# Patient Record
Sex: MALE | Race: WHITE | NOT HISPANIC OR LATINO | Employment: FULL TIME | URBAN - METROPOLITAN AREA
[De-identification: names, ages, dates, MRNs, and addresses within clinical notes are randomized per-mention and may not be internally consistent; named-entity substitution may affect disease eponyms.]

---

## 2017-01-20 ENCOUNTER — HOSPITAL ENCOUNTER (EMERGENCY)
Facility: HOSPITAL | Age: 52
Discharge: HOME | End: 2017-01-20
Attending: SURGERY
Payer: MEDICAID

## 2017-01-20 VITALS
WEIGHT: 180 LBS | HEART RATE: 94 BPM | TEMPERATURE: 97 F | BODY MASS INDEX: 23.11 KG/M2 | DIASTOLIC BLOOD PRESSURE: 92 MMHG | SYSTOLIC BLOOD PRESSURE: 157 MMHG | RESPIRATION RATE: 18 BRPM

## 2017-01-20 DIAGNOSIS — R22.0 LEFT FACIAL SWELLING: Primary | ICD-10-CM

## 2017-01-20 PROCEDURE — 99283 EMERGENCY DEPT VISIT LOW MDM: CPT | Mod: 25

## 2017-01-20 PROCEDURE — 96372 THER/PROPH/DIAG INJ SC/IM: CPT

## 2017-01-20 PROCEDURE — 63600175 PHARM REV CODE 636 W HCPCS: Performed by: SURGERY

## 2017-01-20 RX ORDER — KETOROLAC TROMETHAMINE 30 MG/ML
60 INJECTION, SOLUTION INTRAMUSCULAR; INTRAVENOUS
Status: COMPLETED | OUTPATIENT
Start: 2017-01-20 | End: 2017-01-20

## 2017-01-20 RX ORDER — DEXAMETHASONE SODIUM PHOSPHATE 4 MG/ML
8 INJECTION, SOLUTION INTRA-ARTICULAR; INTRALESIONAL; INTRAMUSCULAR; INTRAVENOUS; SOFT TISSUE
Status: COMPLETED | OUTPATIENT
Start: 2017-01-20 | End: 2017-01-20

## 2017-01-20 RX ADMIN — KETOROLAC TROMETHAMINE 60 MG: 30 INJECTION, SOLUTION INTRAMUSCULAR at 01:01

## 2017-01-20 RX ADMIN — DEXAMETHASONE SODIUM PHOSPHATE 8 MG: 4 INJECTION, SOLUTION INTRAMUSCULAR; INTRAVENOUS at 01:01

## 2017-01-20 NOTE — ED PROVIDER NOTES
Encounter Date: 1/20/2017       History     Chief Complaint   Patient presents with    Facial Pain     left facial swelling and pain, onset yesterday morning    Facial Swelling     Review of patient's allergies indicates:   Allergen Reactions    Adhesive Rash    Latex, natural rubber Rash     HPI Jett Ballard is a 51 y.o. male who experienced a stroke 2 years ago.  He has left-sided weakness   and numbness.  This morning he woke up with mild swelling over his left cheek.  He states it got worse as   the day progressed.  He cannot identify any insect bite, exposure or trauma.  He's never had this before.    He denies any change in his condition, he denies earache or toothache.  He has had this before.  No   physical cause has ever been identified.  He has not tried to see his PCP.  Denies fever or chills.  No   nausea or vomiting.  Denies any worsening weakness or new instability.  Does not Interfere with his ability   to eat or speak.    Past Medical History   Diagnosis Date    Hypertension     Left sided numbness     Leg weakness     Other and unspecified hyperlipidemia     Positional lightheadedness     Stroke     Tremor     Weakness      No past medical history pertinent negatives.  Past Surgical History   Procedure Laterality Date    Back surgery       Family History   Problem Relation Age of Onset    Heart disease Father     Cancer Father      Social History   Substance Use Topics    Smoking status: Never Smoker    Smokeless tobacco: Current User     Types: Snuff      Comment: 33 yr history of dip    Alcohol use No     Review of Systems  -- Constitution - no fever, denies fatigue, no weakness, stable weight  -- Eyes - no tearing or redness, no change in vision, no double vision  -- Ear, Nose - no tinnitus or earache, no nasal congestion or discharge  -- Mouth,Throat - no sore throat, no toothache, denies dysphagia, normal swallowing  -- Respiratory - denies cough and sputum, no shortness of  breath, no GIRALDO, no wheeze  -- Cardiovascular - denies chest pain, no palpitations, denies claudication. No orthopnea   -- Gastrointestinal - denies abdominal pain, nausea, vomiting, diarrhea, or constipation.   -- Genitourinary - denies flank pain and dysuria, also denies frequency or urgency  -- Musculoskeletal - denies muscle or joint pain, back pain  -- Skin - denies rash, hives or welts.  There is mild fullness over the left cheek  -- Neurological - no headache, denies weakness or seizure; no loss of consciousness  -- Lymphatic- no lymphadenopathy or lymphedema noted by patient  -- Endocrine - no thirst, no excessive urination, no heat/cold intolerance.  -- Hematologic - denies abnormal bleeding or bruising, no petechiae  -- Allergic - no immunology issues, no shell fish allergies, no allergic rashes    Physical Exam   Initial Vitals   BP Pulse Resp Temp SpO2   01/20/17 0047 01/20/17 0047 01/20/17 0047 01/20/17 0047 --   155/103 102 18 97.3 °F (36.3 °C)      Physical Exam    Nursing note and vitals reviewed.  Constitutional: He appears well-developed. He is not diaphoretic. No distress.   Thin male in no acute distress.   HENT:   Head: Normocephalic and atraumatic.   Right Ear: External ear normal.   Left Ear: External ear normal.   Nose: Nose normal.   Mouth/Throat: Oropharynx is clear and moist.   Eyes: Conjunctivae and EOM are normal. Pupils are equal, round, and reactive to light. No scleral icterus.   Neck: Normal range of motion. Neck supple. No thyromegaly present. No tracheal deviation present. No JVD present.   Cardiovascular: Normal rate, regular rhythm, normal heart sounds and intact distal pulses. Exam reveals no friction rub.    No murmur heard.  Pulmonary/Chest: Breath sounds normal. No stridor. No respiratory distress. He has no wheezes. He has no rhonchi. He has no rales. He exhibits no tenderness.   Abdominal: Soft. He exhibits no distension and no mass. There is no tenderness. There is no  rebound and no guarding.   Musculoskeletal: He exhibits no edema or tenderness.   Lymphadenopathy:     He has no cervical adenopathy.   Neurological: He is alert. He has normal strength. No cranial nerve deficit or sensory deficit.   Skin: Skin is warm and dry. No erythema. No pallor.   The skin over the left cheek appears to have more laxity than over the right cheek..  There may be mild swelling.  There is no erythema or edema.  It is nontender.  There are no wounds or lesions.   Psychiatric: He has a normal mood and affect. Thought content normal.         ED Course   Procedures  Labs Reviewed - No data to display         MDM: No gross focal neuromuscular deficit.  Patient is not ill.  Symptomatic treatment.    Support and encouragement.  Follow up with PCP.                    ED Course     Clinical Impression:   The encounter diagnosis was Left facial swelling.          Milena Egan MD  01/20/17 0137

## 2017-01-20 NOTE — ED AVS SNAPSHOT
[facility]  [address] [facility] [address]               Jett Ballard   2017 12:48 AM   ED    Description:  Male : 1965   Department:  [facility]           Your Care was Coordinated By:     Provider Role From To    Milena Egan MD Attending Provider 17 0048 --      Reason for Visit     Facial Pain     Facial Swelling           Diagnoses this Visit        Comments    Left facial swelling    -  Primary       ED Disposition     ED Disposition Condition Comment    Discharge  Follow up with PCP next week.           To Do List           Follow-up Information     Follow up with Maynor Benitez MD In 3 days.    Specialty:  Internal Medicine    Contact information:     [address]  [place] [address]  [phone]        [facility] On Call     [facility] -  Assistance  Registered nurses in the [facility] provide clinical advisement, health education, appointment booking, and other advisory services.  Call for this free service at [phone].             Medications           Message regarding Medications     Verify the changes and/or additions to your medication regime listed below are the same as discussed with your clinician today.  If any of these changes or additions are incorrect, please notify your healthcare provider.        These medications were administered today        Dose Freq    ketorolac injection 60 mg 60 mg ED 1 Time    Sig: Inject 2 mLs (60 mg total) into the muscle ED 1 Time.    Class: Normal    Route: Intramuscular    dexamethasone injection 8 mg 8 mg ED 1 Time    Sig: Inject 2 mLs (8 mg total) into the muscle ED 1 Time.    Class: Normal    Route: Intramuscular           Verify that the below list of medications is an accurate representation of the medications you are currently taking.  If none reported, the list may be blank. If incorrect, please contact your healthcare  provider. Carry this list with you in case of emergency.           Current Medications     benazepril (LOTENSIN) 10 MG tablet TAKE ONE-HALF TABLET BY MOUTH ONCE DAILY    benztropine (COGENTIN) 2 MG Tab Take 2 tablets (4 mg total) by mouth 3 (three) times daily.    butalbital-acetaminophen-caffeine -40 mg (FIORICET, ESGIC) -40 mg per tablet Take 1 tablet by mouth every 12 (twelve) hours as needed for Headaches.    diazePAM (VALIUM) 10 MG Tab Take 1 tablet (10 mg total) by mouth every 8 (eight) hours as needed (anxiety).    finasteride (PROSCAR) 5 mg tablet Take 1 tablet (5 mg total) by mouth once daily.    FLOMAX 0.4 mg Cp24 TAKE ONE CAPSULE BY MOUTH ONCE DAILY    gabapentin (NEURONTIN) 300 MG capsule Take 3 capsules (900 mg total) by mouth 3 (three) times daily.    ketorolac (TORADOL) 10 mg tablet Take 1 tablet (10 mg total) by mouth every 6 (six) hours as needed for Pain.    oxycodone-acetaminophen (PERCOCET)  mg per tablet Take 1 tablet by mouth every 8 (eight) hours as needed for Pain.    aspirin (ECOTRIN) 81 MG EC tablet Take 1 tablet (81 mg total) by mouth once daily.    triamcinolone acetonide 0.1% (KENALOG) 0.1 % cream Apply topically 2 (two) times daily.           Clinical Reference Information           Your Vitals Were     BP Pulse Temp Resp Weight BMI    155/103 (BP Location: Left arm, Patient Position: Sitting) 102 97.3 °F (36.3 °C) (Oral) 18 81.6 kg (180 lb) 23.11 kg/m2      Allergies as of 1/20/2017        Reactions    Adhesive Rash    Latex, Natural Rubber Rash      Immunizations Administered on Date of Encounter - 1/20/2017     None      ED Micro, Lab, POCT     None      ED Imaging Orders     None      Your Scheduled Appointments     Jan 31, 2017  2:00 PM CST   Established Patient Visit with MARGARITA Ramey - Urology ([facility])    [address]  [place] [address]   [phone]            Apr 17, 2017  7:30 AM CDT   Fasting Lab with [facility]  LAB   [facility]    [address]  [place] [id number] [phone]               [facility] complies with applicable Federal civil rights laws and does not discriminate on the basis of race, color, national origin, age, disability, or sex.        Language Assistance Services     ATTENTION: Language assistance services are available, free of charge. Please call [phone].      ATENCIÓN: Si habla español, tiene a mackay disposición servicios gratuitos de asistencia lingüística. Llame al [phone].     CHÚ Ý: N?u b?n nói Ti?ng Vi?t, có các d?ch v? h? tr? ngôn ng? mi?n phí dành cho b?n. G?i s? [phone].

## 2017-02-10 ENCOUNTER — HOSPITAL ENCOUNTER (EMERGENCY)
Facility: HOSPITAL | Age: 52
Discharge: HOME | End: 2017-02-10
Attending: SURGERY
Payer: MEDICAID

## 2017-02-10 VITALS
OXYGEN SATURATION: 98 % | HEIGHT: 74 IN | HEART RATE: 97 BPM | SYSTOLIC BLOOD PRESSURE: 135 MMHG | TEMPERATURE: 99 F | WEIGHT: 189 LBS | DIASTOLIC BLOOD PRESSURE: 102 MMHG | RESPIRATION RATE: 14 BRPM | BODY MASS INDEX: 24.26 KG/M2

## 2017-02-10 DIAGNOSIS — R53.1 WEAKNESS: ICD-10-CM

## 2017-02-10 DIAGNOSIS — I69.90 LATE EFFECTS OF CVA (CEREBROVASCULAR ACCIDENT): Primary | ICD-10-CM

## 2017-02-10 LAB
ALBUMIN SERPL BCP-MCNC: 4.2 G/DL
ALP SERPL-CCNC: 101 U/L
ALT SERPL W/O P-5'-P-CCNC: 15 U/L
ANION GAP SERPL CALC-SCNC: 9 MMOL/L
APTT BLDCRRT: 24.3 SEC
AST SERPL-CCNC: 16 U/L
BASOPHILS # BLD AUTO: 0.02 K/UL
BASOPHILS NFR BLD: 0.3 %
BILIRUB SERPL-MCNC: 0.5 MG/DL
BNP SERPL-MCNC: <10 PG/ML
BUN SERPL-MCNC: 16 MG/DL
CALCIUM SERPL-MCNC: 9.7 MG/DL
CHLORIDE SERPL-SCNC: 105 MMOL/L
CK MB SERPL-MCNC: 0.8 NG/ML
CK MB SERPL-RTO: 0.6 %
CK SERPL-CCNC: 130 U/L
CK SERPL-CCNC: 130 U/L
CO2 SERPL-SCNC: 25 MMOL/L
CREAT SERPL-MCNC: 1.1 MG/DL
DIFFERENTIAL METHOD: ABNORMAL
EOSINOPHIL # BLD AUTO: 0.2 K/UL
EOSINOPHIL NFR BLD: 2.4 %
ERYTHROCYTE [DISTWIDTH] IN BLOOD BY AUTOMATED COUNT: 12.6 %
EST. GFR  (AFRICAN AMERICAN): >60 ML/MIN/1.73 M^2
EST. GFR  (NON AFRICAN AMERICAN): >60 ML/MIN/1.73 M^2
GLUCOSE SERPL-MCNC: 91 MG/DL
HCT VFR BLD AUTO: 47.5 %
HGB BLD-MCNC: 15.9 G/DL
INR PPP: 1
LYMPHOCYTES # BLD AUTO: 0.9 K/UL
LYMPHOCYTES NFR BLD: 12.8 %
MCH RBC QN AUTO: 29.8 PG
MCHC RBC AUTO-ENTMCNC: 33.5 %
MCV RBC AUTO: 89 FL
MONOCYTES # BLD AUTO: 0.4 K/UL
MONOCYTES NFR BLD: 5 %
NEUTROPHILS # BLD AUTO: 5.6 K/UL
NEUTROPHILS NFR BLD: 79.5 %
PLATELET # BLD AUTO: 307 K/UL
PMV BLD AUTO: 9.8 FL
POTASSIUM SERPL-SCNC: 4.3 MMOL/L
PROT SERPL-MCNC: 8.1 G/DL
PROTHROMBIN TIME: 9.8 SEC
RBC # BLD AUTO: 5.33 M/UL
SODIUM SERPL-SCNC: 139 MMOL/L
TROPONIN I SERPL DL<=0.01 NG/ML-MCNC: <0.006 NG/ML
WBC # BLD AUTO: 6.98 K/UL

## 2017-02-10 PROCEDURE — 85730 THROMBOPLASTIN TIME PARTIAL: CPT

## 2017-02-10 PROCEDURE — 80053 COMPREHEN METABOLIC PANEL: CPT

## 2017-02-10 PROCEDURE — 85610 PROTHROMBIN TIME: CPT

## 2017-02-10 PROCEDURE — 83880 ASSAY OF NATRIURETIC PEPTIDE: CPT

## 2017-02-10 PROCEDURE — 93010 ELECTROCARDIOGRAM REPORT: CPT | Mod: ,,, | Performed by: INTERNAL MEDICINE

## 2017-02-10 PROCEDURE — 99284 EMERGENCY DEPT VISIT MOD MDM: CPT

## 2017-02-10 PROCEDURE — 36415 COLL VENOUS BLD VENIPUNCTURE: CPT

## 2017-02-10 PROCEDURE — 85025 COMPLETE CBC W/AUTO DIFF WBC: CPT

## 2017-02-10 PROCEDURE — 93005 ELECTROCARDIOGRAM TRACING: CPT

## 2017-02-10 PROCEDURE — 82553 CREATINE MB FRACTION: CPT

## 2017-02-10 PROCEDURE — 84484 ASSAY OF TROPONIN QUANT: CPT

## 2017-02-10 RX ORDER — NAPROXEN SODIUM 220 MG/1
162 TABLET, FILM COATED ORAL DAILY
COMMUNITY

## 2017-02-10 NOTE — ED AVS SNAPSHOT
[facility]  [address] [facility] [address]               Jett Ballard   2/10/2017  2:50 PM   ED    Description:  Male : 1965   Department:  [facility]           Your Care was Coordinated By:     Provider Role From To    Philippe Luis MD Attending Provider 02/10/17 1452 --      Reason for Visit     possible stroke           Diagnoses this Visit        Comments    Late effects of CVA (cerebrovascular accident)    -  Primary     Weakness           ED Disposition     ED Disposition Condition Comment    Discharge             To Do List           Follow-up Information     Follow up with Maynor Benitez MD. Schedule an appointment as soon as possible for a visit in 2 days.    Specialty:  Internal Medicine    Contact information:     [address]  [place] [address]  [phone]        [facility] On Call     [facility] Care [facility] -  Assistance  Registered nurses in the [facility] provide clinical advisement, health education, appointment booking, and other advisory services.  Call for this free service at [phone].             Medications           Message regarding Medications     Verify the changes and/or additions to your medication regime listed below are the same as discussed with your clinician today.  If any of these changes or additions are incorrect, please notify your healthcare provider.        STOP taking these medications     butalbital-acetaminophen-caffeine -40 mg (FIORICET, ESGIC) -40 mg per tablet Take 1 tablet by mouth every 12 (twelve) hours as needed for Headaches.    diazePAM (VALIUM) 10 MG Tab Take 1 tablet (10 mg total) by mouth every 8 (eight) hours as needed (anxiety).           Verify that the below list of medications is an accurate representation of the medications you are currently taking.  If none reported, the list may be blank. If incorrect, please contact your healthcare  "provider. Carry this list with you in case of emergency.           Current Medications     aspirin 81 MG Chew Take 81 mg by mouth once daily.    benazepril (LOTENSIN) 10 MG tablet TAKE ONE-HALF TABLET BY MOUTH ONCE DAILY    benztropine (COGENTIN) 2 MG Tab Take 2 tablets (4 mg total) by mouth 3 (three) times daily.    finasteride (PROSCAR) 5 mg tablet Take 1 tablet (5 mg total) by mouth once daily.    gabapentin (NEURONTIN) 300 MG capsule Take 3 capsules (900 mg total) by mouth 3 (three) times daily.    oxycodone-acetaminophen (PERCOCET)  mg per tablet Take 1 tablet by mouth every 8 (eight) hours as needed for Pain.    tamsulosin (FLOMAX) 0.4 mg Cp24 Take 1 capsule (0.4 mg total) by mouth once daily.    triamcinolone acetonide 0.1% (KENALOG) 0.1 % cream Apply topically 2 (two) times daily.           Clinical Reference Information           Your Vitals Were     BP Pulse Temp Resp Height Weight    135/102 (BP Location: Left arm, Patient Position: Lying) 106 99.3 °F (37.4 °C) (Oral) 19 6' 2" (1.88 m) 85.7 kg (189 lb)    SpO2 BMI             100% 24.27 kg/m2         Allergies as of 2/10/2017        Reactions    Adhesive Rash    Latex, Natural Rubber Rash      Immunizations Administered on Date of Encounter - 2/10/2017     None      ED Micro, Lab, POCT     Start Ordered       Status Ordering Provider    02/10/17 1514 02/10/17 1513  Troponin I  Now then every 6 hours     Start Status   02/10/17 1514 Final result   02/10/17 2114 Acknowledged   02/11/17 0314 Scheduled   02/11/17 0914 Scheduled   02/11/17 1514 Scheduled   02/11/17 2114 Scheduled   02/12/17 0314 Scheduled   02/12/17 0914 Scheduled   02/12/17 1514 Scheduled   02/12/17 2114 Scheduled   02/13/17 0314 Scheduled   02/13/17 0914 Scheduled   02/13/17 1514 Scheduled   02/13/17 2114 Scheduled       Acknowledged     02/10/17 1514 02/10/17 1513  CK  STAT      Final result     02/10/17 1514 02/10/17 1513  CK-MB  STAT      Final result     02/10/17 1514 02/10/17 1513 "  Brain natriuretic peptide  STAT      Final result     02/10/17 1514 02/10/17 1513    STAT,   Status:  Canceled      Canceled     02/10/17 1514 02/10/17 1513    STAT,   Status:  Canceled      Canceled     02/10/17 1458 02/10/17 1457  CBC auto differential  STAT      Final result     02/10/17 1458 02/10/17 1457  Comprehensive metabolic panel  STAT      Final result     02/10/17 1458 02/10/17 1457  Protime-INR  STAT      Final result     02/10/17 1458 02/10/17 1457  APTT  STAT      Final result       ED Imaging Orders     Start Ordered       Status Ordering Provider    02/10/17 1514 02/10/17 1513  X-Ray Chest 1 View  1 time imaging      Final result     02/10/17 1514 02/10/17 1513  MRI Brain Without Contrast  1 time imaging      Final result     02/10/17 1452 02/10/17 1452    1 time imaging,   Status:  Canceled      Canceled         Discharge Instructions         Mood Swings and Depression After a Stroke  After a stroke, a person may feel sudden or extreme emotions. Sadness and depression are common. These feelings may be due to damage in the brain. Or they may be a response to the persons awareness of what has happened.  Coping with mood swings    One common effect of stroke is lability. This problem makes people less able to control their emotions. Lability may cause a sudden mood shift that is out of context with what is going on. A person may suddenly cry or laugh.  You can help  · Stay calm. Accept the behavior and go on with what you were doing.  · If the person apologizes, acknowledge the behavior as a result of the stroke.  · Do not criticize.  · Treat the person with respect at all times.  Dealing with depression  A person may feel depressed after having a stroke. This may be due to brain damage. Changes in body image and grieving for lost skills, such as speech or freedom of movement, may also cause depression.  You can help  · Ask the doctor whether medication can help reduce the depression. You may need  to take your loved one to see a psychiatrist or psychologist if he or she has severe depression.  · Help the person stay active. Play games, watch TV, take a walk, or listen to music together.  · Ask friends to visit if the person is willing to see them.  · Do not discount depression by telling the person to cheer up.  Date Last Reviewed: 6/8/2015  © 3650-1367 [employer]. [address] [place] [address], [place] [address]. All rights reserved. This information is not intended as a substitute for professional medical care. Always follow your healthcare professional's instructions.          Your Scheduled Appointments     Feb 17, 2017  8:00 AM CST   Nurse Visit with UROLOGY NURSE, WES Kirby - Urology ([facility])    [address] [place] [address]   [phone]            Apr 17, 2017  7:30 AM CDT   Fasting Lab with [facility]    [address] [place] [address]   [phone]            Apr 28, 2017  2:20 PM CDT   Established Patient Visit with MD TERRY Loo - Family Medicine ([facility])    [address] [place] [address]   [phone]            May 25, 2017  9:30 AM CDT   Established Patient Visit with MARGARITA Ramey - Urology ([facility])    [address] [place] [address]   [phone]               [facility] complies with applicable Federal civil rights laws and does not discriminate on the basis of race, color, national origin, age, disability, or sex.        Language Assistance Services     ATTENTION: Language assistance services are available, free of charge. Please call [phone].      ATENCIÓN: Si habla molina, tiene a mackay disposición servicios gratuitos de asistencia lingüística. Llame al [phone].     CHÚ Ý: N?u b?n nói Ti?ng Vi?t, có các d?ch v? h? tr? ngôn ng? mi?n phí dành cho  b?n. G?i s? [phone].

## 2017-02-10 NOTE — DISCHARGE INSTRUCTIONS
Mood Swings and Depression After a Stroke  After a stroke, a person may feel sudden or extreme emotions. Sadness and depression are common. These feelings may be due to damage in the brain. Or they may be a response to the persons awareness of what has happened.  Coping with mood swings    One common effect of stroke is lability. This problem makes people less able to control their emotions. Lability may cause a sudden mood shift that is out of context with what is going on. A person may suddenly cry or laugh.  You can help  · Stay calm. Accept the behavior and go on with what you were doing.  · If the person apologizes, acknowledge the behavior as a result of the stroke.  · Do not criticize.  · Treat the person with respect at all times.  Dealing with depression  A person may feel depressed after having a stroke. This may be due to brain damage. Changes in body image and grieving for lost skills, such as speech or freedom of movement, may also cause depression.  You can help  · Ask the doctor whether medication can help reduce the depression. You may need to take your loved one to see a psychiatrist or psychologist if he or she has severe depression.  · Help the person stay active. Play games, watch TV, take a walk, or listen to music together.  · Ask friends to visit if the person is willing to see them.  · Do not discount depression by telling the person to cheer up.  Date Last Reviewed: 6/8/2015  © 1929-4006 [employer]. [address] [place] [address], [place] [address]. All rights reserved. This information is not intended as a substitute for professional medical care. Always follow your healthcare professional's instructions.

## 2017-02-10 NOTE — ED TRIAGE NOTES
Patient was washing a car and began to feel lightheaded.  He has had a stroke in the past and was afraid he was having another one.  Last known well was about 30 min ago, or 1440 this afternoon.

## 2017-02-11 NOTE — ED PROVIDER NOTES
[facility]                                        February 10, 2017                   Chief Complaint  51 y.o. male with possible stroke (started feeling lightheaded while washing the car)    History of Present Illness  Jett Ballard presents to the emergency room with lightheadedness today  Patient states he was washing his car and felt lightheaded, much better on arrival  Patient is alert and oriented ×3 with a GCS of 15 and a baseline motor exam now  Well known to the emergency room, he had a CVA 2 years ago with late effects  Patient has normal vision and grossly normal cranial nerve exam in the ER today  Patient has a baseline  and his baseline gait, good vital signs on arrival here  Patient has had several issues with late effects from his CVA, feels much better     The history is provided by the patient     Past Medical History   -- Hypertension      -- Left sided numbness      -- Leg weakness      -- Other and unspecified hyperlipidemia      -- Positional lightheadedness      -- Stroke      -- Tremor      -- Weakness          Past Surgical History   -- Back surgery             Allergies   -- Adhesive      -- Latex, Natural Rubber      Review of Systems and Physical Exam     Review of Systems  -- Constitution - no fever, denies fatigue, no weakness, no chills  -- Eyes - no tearing or redness, no visual disturbance  -- Ear, Nose - no tinnitus or earache, no nasal congestion or discharge  -- Mouth,Throat - no sore throat, no toothache, normal voice, normal swallowing  -- Respiratory - denies cough and congestion, no shortness of breath, no IGRALDO  -- Cardiovascular - denies chest pain, no palpitations, denies claudication  -- Gastrointestinal - denies abdominal pain, nausea, vomiting, or diarrhea  -- Musculoskeletal - denies back pain, negative for myalgias and arthralgias   -- Neurological - headache, denies weakness or seizure; no LOC  -- Skin - denies pallor, rash, or changes in skin.  no hives or welts noted    Vital Signs  -- His blood pressure is 135/102 (abnormal) and his pulse is 97.   -- His respiration is 14 and oxygen saturation is 98%.      Physical Exam  -- Nursing note and vitals reviewed  -- Constitutional: Appears well-developed and well-nourished  -- Head: Atraumatic. Normocephalic. No obvious abnormality  -- Eyes: Pupils are equal and reactive to light. Normal conjunctiva and lids  -- Cardiac: Normal rate, regular rhythm and normal heart sounds  -- Pulmonary: Normal respiratory effort, breath sounds clear to auscultation  -- Abdominal: Soft, no tenderness. Normal bowel sounds. Normal liver edge  -- Musculoskeletal: Normal range of motion, no effusions. Joints stable   -- Neurological: No focal deficits. Showed good interaction with staff  -- Vascular: Posterior tibial, dorsalis pedis and radial pulses 2+ bilaterally      Emergency Room Course     Treatment and Evaluation  -- The MRI brain performed in the ER today was negative for acute pathology   -- The EKG findings today were without concerning findings from baseline   -- The electrolytes drawn in the ER today were within normal limits   -- The CBC drawn in the ER today was within normal limits   -- The cardiac enzymes were within normal limits   -- The PT, PTT, and INR were within normal limits.    -- The BNP drawn in the ER today were within normal limits     Diagnosis  -- Late effects of CVA (cerebrovascular accident).   -- A diagnosis of Weakness was also pertinent to this visit.    Disposition and Plan  -- Disposition: home  -- Condition: stable  -- Follow-up: Patient to follow up with Maynor Benitez MD in 1-2 days.  -- I advised the patient that we have found no life threatening condition today  -- At this time, I believe the patient is clinically stable for discharge.   -- The patient acknowledges that close follow up with a MD is required   -- Patient agrees to comply with all instruction and direction given in the  ER    This note is dictated on Dragon Natural Speaking word recognition program.  There are word recognition mistakes that are occasionally missed on review.           Philippe Luis MD  02/11/17 0681

## 2017-02-12 ENCOUNTER — HOSPITAL ENCOUNTER (EMERGENCY)
Facility: HOSPITAL | Age: 52
Discharge: HOME | End: 2017-02-12
Attending: SURGERY
Payer: MEDICAID

## 2017-02-12 VITALS
HEART RATE: 94 BPM | DIASTOLIC BLOOD PRESSURE: 83 MMHG | TEMPERATURE: 98 F | SYSTOLIC BLOOD PRESSURE: 154 MMHG | RESPIRATION RATE: 20 BRPM

## 2017-02-12 DIAGNOSIS — N50.1 SCROTAL BLEEDING: Primary | ICD-10-CM

## 2017-02-12 PROCEDURE — 99283 EMERGENCY DEPT VISIT LOW MDM: CPT

## 2017-02-12 RX ORDER — CEPHALEXIN 500 MG/1
500 CAPSULE ORAL EVERY 6 HOURS
Qty: 28 CAPSULE | Refills: 0 | Status: SHIPPED | OUTPATIENT
Start: 2017-02-12 | End: 2017-02-15

## 2017-02-12 RX ORDER — MUPIROCIN 20 MG/G
OINTMENT TOPICAL 3 TIMES DAILY
Qty: 15 G | Refills: 0 | Status: SHIPPED | OUTPATIENT
Start: 2017-02-12 | End: 2017-02-22

## 2017-02-12 NOTE — ED AVS SNAPSHOT
[facility]  [address] [facility] [address]               Jett Ballard   2017  1:48 PM   ED    Description:  Male : 1965   Department:  [facility]           Your Care was Coordinated By:     Provider Role From To    Philippe Luis MD Attending Provider 17 1213 --      Reason for Visit     Testicle Pain           Diagnoses this Visit        Comments    Scrotal bleeding    -  Primary       ED Disposition     ED Disposition Condition Comment    Discharge             To Do List           Follow-up Information     Follow up with Maynor Benitez MD. Schedule an appointment as soon as possible for a visit in 2 days.    Specialty:  Internal Medicine    Contact information:     [address]  [place] [id number] [phone]         These Medications        Disp Refills Start End    mupirocin (BACTROBAN) 2 % ointment 15 g 0 2017    Apply topically 3 (three) times daily. - Topical (Top)    Pharmacy: [facility] [address] [place] [address] Ph #: [phone]       cephALEXin (KEFLEX) 500 MG capsule 28 capsule 0 2017    Take 1 capsule (500 mg total) by mouth every 6 (six) hours. - Oral    Pharmacy: [facility] [address] [place] [address] Ph #: [phone]         [facility] On Call     [facility] On Call Nurse Care Line -  Assistance  Registered nurses in the [facility] provide clinical advisement, health education, appointment booking, and other advisory services.  Call for this free service at [phone].             Medications           Message regarding Medications     Verify the changes and/or additions to your medication regime listed below are the same as discussed with your clinician today.  If any of these changes or additions are incorrect, please notify your healthcare provider.        START taking these NEW medications        Refills    mupirocin  (BACTROBAN) 2 % ointment 0    Sig: Apply topically 3 (three) times daily.    Class: Normal    Route: Topical (Top)    cephALEXin (KEFLEX) 500 MG capsule 0    Sig: Take 1 capsule (500 mg total) by mouth every 6 (six) hours.    Class: Normal    Route: Oral      These medications were administered today        Dose Freq    neomycin-bacitracnZn-polymyxnB packet 1 each 1 packet ED 1 Time    Sig: Apply 1 each topically ED 1 Time.    Class: Normal    Route: Topical (Top)           Verify that the below list of medications is an accurate representation of the medications you are currently taking.  If none reported, the list may be blank. If incorrect, please contact your healthcare provider. Carry this list with you in case of emergency.           Current Medications     aspirin 81 MG Chew Take 81 mg by mouth once daily.    benazepril (LOTENSIN) 10 MG tablet TAKE ONE-HALF TABLET BY MOUTH ONCE DAILY    benztropine (COGENTIN) 2 MG Tab Take 2 tablets (4 mg total) by mouth 3 (three) times daily.    finasteride (PROSCAR) 5 mg tablet Take 1 tablet (5 mg total) by mouth once daily.    gabapentin (NEURONTIN) 300 MG capsule Take 3 capsules (900 mg total) by mouth 3 (three) times daily.    tamsulosin (FLOMAX) 0.4 mg Cp24 Take 1 capsule (0.4 mg total) by mouth once daily.    cephALEXin (KEFLEX) 500 MG capsule Take 1 capsule (500 mg total) by mouth every 6 (six) hours.    mupirocin (BACTROBAN) 2 % ointment Apply topically 3 (three) times daily.    neomycin-bacitracnZn-polymyxnB packet 1 each Apply 1 each topically ED 1 Time.    oxycodone-acetaminophen (PERCOCET)  mg per tablet Take 1 tablet by mouth every 8 (eight) hours as needed for Pain.    triamcinolone acetonide 0.1% (KENALOG) 0.1 % cream Apply topically 2 (two) times daily.           Clinical Reference Information           Your Vitals Were     BP Pulse Temp Resp          165/96 (BP Location: Right arm, Patient Position: Sitting) 102 98.2 °F (36.8 °C) (Oral) 18         Allergies as of 2/12/2017        Reactions    Adhesive Rash    Latex, Natural Rubber Rash      Immunizations Administered on Date of Encounter - 2/12/2017     None      ED Micro, Lab, POCT     None      ED Imaging Orders     None      Discharge References/Attachments     BLEEDING: FIRST AID (ENGLISH)      Your Scheduled Appointments     Feb 17, 2017  8:00 AM CST   Nurse Visit with UROLOGY NURSE, WES Kirby - Urology ([facility])    [address] [facility] [address]   [phone]            Apr 17, 2017  7:30 AM CDT   Fasting Lab with [facility]    [address] [facility] [place] [address]   [phone]            Apr 28, 2017  2:20 PM CDT   Established Patient Visit with MD TERRY Loo - Family Medicine ([facility])    [address] [facility] [address]   [phone]            May 25, 2017  9:30 AM CDT   Established Patient Visit with MARGARITA Ramey - Urology ([facility])    [address] [facility] [address]   [phone]               [facility] complies with applicable Federal civil rights laws and does not discriminate on the basis of race, color, national origin, age, disability, or sex.        Language Assistance Services     ATTENTION: Language assistance services are available, free of charge. Please call [phone].      ATENCIÓN: Si habla español, tiene a mackay disposición servicios gratuitos de asistencia lingüística. Llame al [phone].     CHÚ Ý: N?u b?n nói Ti?ng Vi?t, có các d?ch v? h? tr? ngôn ng? mi?n phí dành cho b?n. G?i s? [phone].

## 2017-02-12 NOTE — ED PROVIDER NOTES
[facility]                                        February 12, 2017                   Chief Complaint  51 y.o. male with Testicle Pain (Pt reports scrotol bleeding )    History of Present Illness  Jett Ballard presents to the emergency room with left scrotal bleeding today  Patient sat on the toilet and hit his scrotum, bleeding started from a small pustule  Patient held pressure for several minutes, noted residual bleeding at home PTA  Presents to the emergency room to reassess the bleeding from the scrotal area  There is no actual bleeding, small dried blood on top of the pustule left scrotum  Patient has no signs of testicular trauma, no testicular hematoma, no testicle pain  Patient denies dysuria, hematuria, pyuria, only complaint was left scrotal bleeding  No current bleeding (hemostasis), area of previous bleeding was small & pinpoint    The history is provided by the patient      Past Medical History   -- Hypertension      -- Left sided numbness      -- Leg weakness      -- Other and unspecified hyperlipidemia      -- Positional lightheadedness      -- Stroke      -- Tremor      -- Weakness          Past Surgical History   -- Back surgery             Allergies   -- Adhesive      -- Latex, Natural Rubber      Review of Systems and Physical Exam     Review of Systems  -- Constitution - no fever, denies fatigue, no weakness, no chills  -- Eyes - no tearing or redness, no visual disturbance  -- Ear, Nose - no tinnitus or earache, no nasal congestion or discharge  -- Mouth,Throat - no sore throat, no toothache, normal voice, normal swallowing  -- Respiratory - denies cough and congestion, no shortness of breath, no GIRALDO  -- Cardiovascular - denies chest pain, no palpitations, denies claudication  -- Gastrointestinal - denies abdominal pain, nausea, vomiting, or diarrhea  -- Genitourinary - scrotal bleeding earlier this morning  -- Musculoskeletal - denies back pain, negative for myalgias and  arthralgias   -- Neurological - no headache, denies weakness or seizure; no LOC  -- Skin - denies pallor, rash, or changes in skin. no hives or welts noted    Vital Signs  -- BP is 165/96 (abnormal) and his pulse is 102. His respiration is 18.      Physical Exam  -- Nursing note and vitals reviewed  -- Constitutional: Appears well-developed and well-nourished  -- Head: Atraumatic. Normocephalic. No obvious abnormality  -- Eyes: Pupils are equal and reactive to light. Normal conjunctiva and lids  -- Cardiac: Normal rate, regular rhythm and normal heart sounds  -- Pulmonary: Normal respiratory effort, breath sounds clear to auscultation  -- Abdominal: Soft, no tenderness. Normal bowel sounds. Normal liver edge  -- Genitourinary: Pinpoint area of dry blood on the left scrotum, no active bleeding  -- Musculoskeletal: Normal range of motion, no effusions. Joints stable   -- Neurological: No focal deficits. Showed good interaction with staff    Emergency Room Course     Treatment and Evaluation  -- The affected area was cleaned and neosporin applied in the ER today     Diagnosis  -- The encounter diagnosis was Scrotal bleeding.    Disposition and Plan  -- Disposition: home  -- Condition: stable  -- Follow-up: Patient to follow up with Maynor Benitez MD in 1-2 days.  -- I advised the patient that we have found no life threatening condition today  -- At this time, I believe the patient is clinically stable for discharge.   -- The patient acknowledges that close follow up with a MD is required   -- Patient agrees to comply with all instruction and direction given in the ER    This note is dictated on Dragon Natural Speaking word recognition program.  There are word recognition mistakes that are occasionally missed on review.           Philippe Luis MD  02/12/17 9345

## 2017-02-14 ENCOUNTER — NURSE TRIAGE (OUTPATIENT)
Dept: ADMINISTRATIVE | Facility: CLINIC | Age: 52
End: 2017-02-14

## 2017-02-15 ENCOUNTER — HOSPITAL ENCOUNTER (EMERGENCY)
Facility: HOSPITAL | Age: 52
Discharge: HOME | End: 2017-02-15
Attending: SURGERY
Payer: MEDICAID

## 2017-02-15 VITALS
TEMPERATURE: 96 F | DIASTOLIC BLOOD PRESSURE: 92 MMHG | WEIGHT: 189 LBS | HEIGHT: 74 IN | HEART RATE: 100 BPM | BODY MASS INDEX: 24.26 KG/M2 | SYSTOLIC BLOOD PRESSURE: 151 MMHG

## 2017-02-15 DIAGNOSIS — M54.50 CHRONIC LOW BACK PAIN WITHOUT SCIATICA, UNSPECIFIED BACK PAIN LATERALITY: Primary | ICD-10-CM

## 2017-02-15 DIAGNOSIS — G89.29 CHRONIC LOW BACK PAIN WITHOUT SCIATICA, UNSPECIFIED BACK PAIN LATERALITY: Primary | ICD-10-CM

## 2017-02-15 LAB
ALBUMIN SERPL BCP-MCNC: 4.1 G/DL
ALP SERPL-CCNC: 96 U/L
ALT SERPL W/O P-5'-P-CCNC: 14 U/L
ANION GAP SERPL CALC-SCNC: 9 MMOL/L
AST SERPL-CCNC: 16 U/L
BASOPHILS # BLD AUTO: 0.04 K/UL
BASOPHILS NFR BLD: 1 %
BILIRUB SERPL-MCNC: 0.5 MG/DL
BILIRUB UR QL STRIP: NEGATIVE
BUN SERPL-MCNC: 15 MG/DL
CALCIUM SERPL-MCNC: 9.5 MG/DL
CHLORIDE SERPL-SCNC: 105 MMOL/L
CLARITY UR: CLEAR
CO2 SERPL-SCNC: 27 MMOL/L
COLOR UR: YELLOW
CREAT SERPL-MCNC: 0.9 MG/DL
DIFFERENTIAL METHOD: ABNORMAL
EOSINOPHIL # BLD AUTO: 0.1 K/UL
EOSINOPHIL NFR BLD: 2.6 %
ERYTHROCYTE [DISTWIDTH] IN BLOOD BY AUTOMATED COUNT: 12.8 %
EST. GFR  (AFRICAN AMERICAN): >60 ML/MIN/1.73 M^2
EST. GFR  (NON AFRICAN AMERICAN): >60 ML/MIN/1.73 M^2
GLUCOSE SERPL-MCNC: 72 MG/DL
GLUCOSE UR QL STRIP: NEGATIVE
HCT VFR BLD AUTO: 47.7 %
HGB BLD-MCNC: 15.9 G/DL
HGB UR QL STRIP: NEGATIVE
KETONES UR QL STRIP: NEGATIVE
LEUKOCYTE ESTERASE UR QL STRIP: NEGATIVE
LYMPHOCYTES # BLD AUTO: 0.7 K/UL
LYMPHOCYTES NFR BLD: 19 %
MCH RBC QN AUTO: 30.1 PG
MCHC RBC AUTO-ENTMCNC: 33.3 %
MCV RBC AUTO: 90 FL
MONOCYTES # BLD AUTO: 0.3 K/UL
MONOCYTES NFR BLD: 6.5 %
NEUTROPHILS # BLD AUTO: 2.7 K/UL
NEUTROPHILS NFR BLD: 70.9 %
NITRITE UR QL STRIP: NEGATIVE
PH UR STRIP: 7 [PH] (ref 5–8)
PLATELET # BLD AUTO: 254 K/UL
PMV BLD AUTO: 10 FL
POTASSIUM SERPL-SCNC: 4.2 MMOL/L
PROT SERPL-MCNC: 7.7 G/DL
PROT UR QL STRIP: NEGATIVE
RBC # BLD AUTO: 5.29 M/UL
SODIUM SERPL-SCNC: 141 MMOL/L
SP GR UR STRIP: 1.01 (ref 1–1.03)
URN SPEC COLLECT METH UR: NORMAL
UROBILINOGEN UR STRIP-ACNC: NEGATIVE EU/DL
WBC # BLD AUTO: 3.84 K/UL

## 2017-02-15 PROCEDURE — 25000003 PHARM REV CODE 250: Performed by: SURGERY

## 2017-02-15 PROCEDURE — 99284 EMERGENCY DEPT VISIT MOD MDM: CPT

## 2017-02-15 PROCEDURE — 85025 COMPLETE CBC W/AUTO DIFF WBC: CPT

## 2017-02-15 PROCEDURE — 80053 COMPREHEN METABOLIC PANEL: CPT

## 2017-02-15 PROCEDURE — 81003 URINALYSIS AUTO W/O SCOPE: CPT

## 2017-02-15 PROCEDURE — 36415 COLL VENOUS BLD VENIPUNCTURE: CPT

## 2017-02-15 RX ORDER — HYDROCODONE BITARTRATE AND ACETAMINOPHEN 10; 325 MG/1; MG/1
1 TABLET ORAL
Status: COMPLETED | OUTPATIENT
Start: 2017-02-15 | End: 2017-02-15

## 2017-02-15 RX ADMIN — HYDROCODONE BITARTRATE AND ACETAMINOPHEN 1 TABLET: 10; 325 TABLET ORAL at 01:02

## 2017-02-15 NOTE — ED AVS SNAPSHOT
[facility] [address] [facility] [address]               Jett Ballard   2/15/2017 12:40 PM   ED    Description:  Male : 1965   Department:  [facility]           Your Care was Coordinated By:     Provider Role From To    Philippe Luis MD Attending Provider 02/15/17 1234 --      Reason for Visit     Flank Pain           Diagnoses this Visit        Comments    Chronic low back pain without sciatica, unspecified back pain laterality    -  Primary       ED Disposition     ED Disposition Condition Comment    Discharge             To Do List           Follow-up Information     Follow up with Maynor Benitez MD. Schedule an appointment as soon as possible for a visit in 2 days.    Specialty:  Internal Medicine    Contact information:     [email or web address] [place] [address]  [phone]        [facility] On Call     [facility] Nurse Care [facility] -  Assistance  Registered nurses in the [facility] provide clinical advisement, health education, appointment booking, and other advisory services.  Call for this free service at [phone].             Medications           Message regarding Medications     Verify the changes and/or additions to your medication regime listed below are the same as discussed with your clinician today.  If any of these changes or additions are incorrect, please notify your healthcare provider.        These medications were administered today        Dose Freq    hydrocodone-acetaminophen 10-325mg per tablet 1 tablet 1 tablet ED 1 Time    Sig: Take 1 tablet by mouth ED 1 Time.    Class: Normal    Route: Oral      STOP taking these medications     cephALEXin (KEFLEX) 500 MG capsule Take 1 capsule (500 mg total) by mouth every 6 (six) hours.           Verify that the below list of medications is an accurate representation of the medications you are currently taking.  If none reported, the list may be blank. If  "incorrect, please contact your healthcare provider. Carry this list with you in case of emergency.           Current Medications     aspirin 81 MG Chew Take 81 mg by mouth once daily.    benazepril (LOTENSIN) 10 MG tablet TAKE ONE-HALF TABLET BY MOUTH ONCE DAILY    benztropine (COGENTIN) 2 MG Tab Take 2 tablets (4 mg total) by mouth 3 (three) times daily.    finasteride (PROSCAR) 5 mg tablet Take 1 tablet (5 mg total) by mouth once daily.    gabapentin (NEURONTIN) 300 MG capsule Take 3 capsules (900 mg total) by mouth 3 (three) times daily.    mupirocin (BACTROBAN) 2 % ointment Apply topically 3 (three) times daily.    oxycodone-acetaminophen (PERCOCET)  mg per tablet Take 1 tablet by mouth every 8 (eight) hours as needed for Pain.    tamsulosin (FLOMAX) 0.4 mg Cp24 Take 1 capsule (0.4 mg total) by mouth once daily.    triamcinolone acetonide 0.1% (KENALOG) 0.1 % cream Apply topically 2 (two) times daily.           Clinical Reference Information           Your Vitals Were     BP Pulse Temp Height Weight BMI    151/92 (BP Location: Left arm, Patient Position: Sitting) 100 95.9 °F (35.5 °C) (Oral) 6' 2" (1.88 m) 85.7 kg (189 lb) 24.27 kg/m2      Allergies as of 2/15/2017        Reactions    Adhesive Rash    Latex, Natural Rubber Rash      Immunizations Administered on Date of Encounter - 2/15/2017     None      ED Micro, Lab, POCT     Start Ordered       Status Ordering Provider    02/15/17 1242 02/15/17 1242  CBC auto differential  STAT      Final result     02/15/17 1242 02/15/17 1242  Comprehensive metabolic panel  STAT      Final result     02/15/17 1242 02/15/17 1242  Urinalysis  STAT      Final result       ED Imaging Orders     Start Ordered       Status Ordering Provider    02/15/17 1242 02/15/17 1242  CT Renal Stone Study ABD Pelvis WO  1 time imaging     Comments:  Right flank pain with dysuria    Final result         Discharge Instructions         Back Care Tips    Caring for your back  These are " things you can do to prevent a recurrence of acute back pain and to reduce symptoms from chronic back pain:  · Maintain a healthy weight. If you are overweight, losing weight will help most types of back pain.  · Exercise is an important part of recovery from most types of back pain. The muscles behind and in front of the spine support the back. This means strengthening both the back muscles and the abdominal muscles will provide better support for your spine.   · Swimming and brisk walking are good overall exercises to improve your fitness level.  · Practice safe lifting methods (below).  · Practice good posture when sitting, standing and walking. Avoid prolonged sitting. This puts more stress on the lower back than standing or walking.  · Wear quality shoes with sufficient arch support. Foot and ankle alignment can affect back symptoms. Women should avoid wearing high heels.  · Therapeutic massage can help relax the back muscles without stretching them.  · During the first 24 to 72 hours after an acute injury or flare-up of chronic back pain, apply an ice pack to the painful area for 20 minutes and then remove it for 20 minutes, over a period of 60 to 90 minutes, or several times a day. As a safety precaution, do not use a heating pad at bedtime. Sleeping on a heating pad can lead to skin burns or tissue damage.  · You can alternate ice and heat therapies.  Medications  Talk to your healthcare provider before using medicines, especially if you have other medical problems or are taking other medicines.  · You may use acetaminophen or ibuprofen to control pain, unless your healthcare provider prescribed other pain medicine. If you have chronic conditions like diabetes, liver or kidney disease, stomach ulcers, or gastrointestinal bleeding, or are taking blood thinners, talk with your healthcare provider before taking any medicines.  · Be careful if you are given prescription pain medicines, narcotics, or medicine for  muscle spasm. They can cause drowsiness, affect your coordination, reflexes, and judgment. Do not drive or operate heavy machinery while taking these types of medicines. Take prescription pain medicine only as prescribed by your healthcare provider.  Lumbar stretch  Here is a simple stretching exercise that will help relax muscle spasm and keep your back more limber. If exercise makes your back pain worse, dont do it.  · Lie on your back with your knees bent and both feet on the ground.  · Slowly raise your left knee to your chest as you flatten your lower back against the floor. Hold for 5 seconds.  · Relax and repeat the exercise with your right knee.  · Do 10 of these exercises for each leg.  Safe lifting method  · Dont bend over at the waist to lift an object off the floor.  Instead, bend your knees and hips in a squat.   · Keep your back and head upright  · Hold the object close to your body, directly in front of you.  · Straighten your legs to lift the object.   · Lower the object to the floor in the reverse fashion.  · If you must slide something across the floor, push it.  Posture tips  Sitting  Sit in chairs with straight backs or low-back support. Keep your knees lower than your hips, with your feet flat on the floor.  When driving, sit up straight. Adjust the seat forward so you are not leaning toward the steering wheel.  A small pillow or rolled towel behind your lower back may help if you are driving long distances.   Standing  When standing for long periods, shift most of your weight to one leg at a time. Alternate legs every few minutes.   Sleeping  The best way to sleep is on your side with your knees bent. Put a low pillow under your head to support your neck in a neutral spine position. Avoid thick pillows that bend your neck to one side. Put a pillow between your legs to further relax your lower back. If you sleep on your back, put pillows under your knees to support your legs in a slightly  flexed position. Use a firm mattress. If your mattress sags, replace it, or use a 1/2-inch plywood board under the mattress to add support.  Follow-up care  Follow up with your healthcare provider, or as advised.  If X-rays, a CT scan or an MRI scan were taken, they will be reviewed by a radiologist. You will be notified of any new findings that may affect your care.  Call 911  Seek emergency medical care if any of the following occur:  · Trouble breathing  · Confusion  · Very drowsy  · Fainting or loss of consciousness  · Rapid or very slow heart rate  · Loss of  bowel or bladder control  When to seek medical care  Call your healthcare provider if any of the following occur:  · Pain becomes worse or spreads to your arms or legs  · Weakness or numbness in one or both arms or legs  · Numbness in the groin area  Date Last Reviewed: 6/1/2016  © 5294-3347 [employer]. [address] [place] [address]. All rights reserved. This information is not intended as a substitute for professional medical care. Always follow your healthcare professional's instructions.          Your Scheduled Appointments     Feb 17, 2017  8:00 AM CST   Nurse Visit with UROLOGY NURSE, WES Kirby - Urology ([facility])    [address] [facility] [address]   [phone]            Apr 17, 2017  7:30 AM CDT   Fasting Lab with [facility]   [facility]    [address] [facility] [place] [address]   [phone]            Apr 28, 2017  2:20 PM CDT   Established Patient Visit with MD TERRY Loo - Family Medicine ([facility])    [address] [facility] [address]   [phone]            May 25, 2017  9:30 AM CDT   Established Patient Visit with MARGARITA Ramey - Urology ([facility])    [address] [facility] [place] [address]   [phone]               [facility]  complies with applicable Federal civil rights laws and does not discriminate on the basis of race, color, national origin, age, disability, or sex.        Language Assistance Services     ATTENTION: Language assistance services are available, free of charge. Please call [phone].      ATENCIÓN: Si habla español, tiene a mackay disposición servicios gratuitos de asistencia lingüística. Llame al [phone].     CHÚ Ý: N?u b?n nói Ti?ng Vi?t, có các d?ch v? h? tr? ngôn ng? mi?n phí dành cho b?n. G?i s? [phone].

## 2017-02-15 NOTE — ED TRIAGE NOTES
Patient comes in today complaining of pain to his right lower back just above the hip since yesterday.

## 2017-02-15 NOTE — TELEPHONE ENCOUNTER
"  Reason for Disposition   [1] SEVERE back pain (e.g., excruciating, unable to do any normal activities) AND [2] not improved 2 hours after pain medicine    Answer Assessment - Initial Assessment Questions  1. ONSET: "When did the pain begin?"       About 1000 today  2. LOCATION: "Where does it hurt?" (upper, mid or lower back)      Right lower back around hip  3. SEVERITY: "How bad is the pain?"  (e.g., Scale 1-10; mild, moderate, or severe)    - MILD (1-3): doesn't interfere with normal activities     - MODERATE (4-7): interferes with normal activities or awakens from sleep     - SEVERE (8-10): excruciating pain, unable to do any normal activities       10  4. PATTERN: "Is the pain constant?" (e.g., yes, no; constant, intermittent)       constant  5. RADIATION: "Does the pain shoot into your legs or elsewhere?"      denied  6. CAUSE:  "What do you think is causing the back pain?"       Not sure  7. BACK OVERUSE:  "Any recent lifting of heavy objects, strenuous work or exercise?"      no  8. MEDICATIONS: "What have you taken so far for the pain?" (e.g., nothing, acetaminophen, NSAIDS)      motrin  9. NEUROLOGIC SYMPTOMS: "Do you have any weakness, numbness, or problems with bowel/bladder control?"      Nothing new- has past hx of cva 2 yrs atgo  10. OTHER SYMPTOMS: "Do you have any other symptoms?" (e.g., fever, abdominal pain, burning with urination, blood in urine)        None reported  11. PREGNANCY: "Is there any chance you are pregnant?" (e.g., yes, no; LMP)        n/a    Protocols used: ST BACK PAIN-A-    "

## 2017-02-15 NOTE — DISCHARGE INSTRUCTIONS
Back Care Tips    Caring for your back  These are things you can do to prevent a recurrence of acute back pain and to reduce symptoms from chronic back pain:  · Maintain a healthy weight. If you are overweight, losing weight will help most types of back pain.  · Exercise is an important part of recovery from most types of back pain. The muscles behind and in front of the spine support the back. This means strengthening both the back muscles and the abdominal muscles will provide better support for your spine.   · Swimming and brisk walking are good overall exercises to improve your fitness level.  · Practice safe lifting methods (below).  · Practice good posture when sitting, standing and walking. Avoid prolonged sitting. This puts more stress on the lower back than standing or walking.  · Wear quality shoes with sufficient arch support. Foot and ankle alignment can affect back symptoms. Women should avoid wearing high heels.  · Therapeutic massage can help relax the back muscles without stretching them.  · During the first 24 to 72 hours after an acute injury or flare-up of chronic back pain, apply an ice pack to the painful area for 20 minutes and then remove it for 20 minutes, over a period of 60 to 90 minutes, or several times a day. As a safety precaution, do not use a heating pad at bedtime. Sleeping on a heating pad can lead to skin burns or tissue damage.  · You can alternate ice and heat therapies.  Medications  Talk to your healthcare provider before using medicines, especially if you have other medical problems or are taking other medicines.  · You may use acetaminophen or ibuprofen to control pain, unless your healthcare provider prescribed other pain medicine. If you have chronic conditions like diabetes, liver or kidney disease, stomach ulcers, or gastrointestinal bleeding, or are taking blood thinners, talk with your healthcare provider before taking any medicines.  · Be careful if you are given  prescription pain medicines, narcotics, or medicine for muscle spasm. They can cause drowsiness, affect your coordination, reflexes, and judgment. Do not drive or operate heavy machinery while taking these types of medicines. Take prescription pain medicine only as prescribed by your healthcare provider.  Lumbar stretch  Here is a simple stretching exercise that will help relax muscle spasm and keep your back more limber. If exercise makes your back pain worse, dont do it.  · Lie on your back with your knees bent and both feet on the ground.  · Slowly raise your left knee to your chest as you flatten your lower back against the floor. Hold for 5 seconds.  · Relax and repeat the exercise with your right knee.  · Do 10 of these exercises for each leg.  Safe lifting method  · Dont bend over at the waist to lift an object off the floor.  Instead, bend your knees and hips in a squat.   · Keep your back and head upright  · Hold the object close to your body, directly in front of you.  · Straighten your legs to lift the object.   · Lower the object to the floor in the reverse fashion.  · If you must slide something across the floor, push it.  Posture tips  Sitting  Sit in chairs with straight backs or low-back support. Keep your knees lower than your hips, with your feet flat on the floor.  When driving, sit up straight. Adjust the seat forward so you are not leaning toward the steering wheel.  A small pillow or rolled towel behind your lower back may help if you are driving long distances.   Standing  When standing for long periods, shift most of your weight to one leg at a time. Alternate legs every few minutes.   Sleeping  The best way to sleep is on your side with your knees bent. Put a low pillow under your head to support your neck in a neutral spine position. Avoid thick pillows that bend your neck to one side. Put a pillow between your legs to further relax your lower back. If you sleep on your back, put pillows  under your knees to support your legs in a slightly flexed position. Use a firm mattress. If your mattress sags, replace it, or use a 1/2-inch plywood board under the mattress to add support.  Follow-up care  Follow up with your healthcare provider, or as advised.  If X-rays, a CT scan or an MRI scan were taken, they will be reviewed by a radiologist. You will be notified of any new findings that may affect your care.  Call 911  Seek emergency medical care if any of the following occur:  · Trouble breathing  · Confusion  · Very drowsy  · Fainting or loss of consciousness  · Rapid or very slow heart rate  · Loss of  bowel or bladder control  When to seek medical care  Call your healthcare provider if any of the following occur:  · Pain becomes worse or spreads to your arms or legs  · Weakness or numbness in one or both arms or legs  · Numbness in the groin area  Date Last Reviewed: 6/1/2016  © 3857-7943 [employer]. [address] [place] [address], [place] [address]. All rights reserved. This information is not intended as a substitute for professional medical care. Always follow your healthcare professional's instructions.

## 2017-02-15 NOTE — ED PROVIDER NOTES
[facility]                                        February 15, 2017                   Chief Complaint  51 y.o. male with Flank Pain (right lower back pain, just above the hip)    History of Present Illness  Jett Ballard presents to the emergency room with right flank pain today  Patient states he is a sharp 5/10 pain right above the hip, no trauma history  Patient denies hematuria, dysuria, pyuria, renal stone history, UTI history  Pt states that the pain is worse with movement or activity, history of DJD  Normal bowel movement/urination with no signs of cauda equina syndrome  Patient states he does not know if it's his flank or his back, stable in the ER    The history is provided by the patient      Past Medical History   -- Hypertension      -- Left sided numbness      -- Leg weakness      -- Other and unspecified hyperlipidemia      -- Positional lightheadedness      -- Stroke      -- Tremor      -- Weakness          Past Surgical History   -- Back surgery             Allergies   -- Adhesive      -- Latex, Natural Rubber      Review of Systems and Physical Exam     Review of Systems  -- Constitution - no fever, denies fatigue, no weakness, no chills  -- Eyes - no tearing or redness, no visual disturbance  -- Ear, Nose - no tinnitus or earache, no nasal congestion or discharge  -- Mouth,Throat - no sore throat, no toothache, normal voice, normal swallowing  -- Respiratory - denies cough and congestion, no shortness of breath, no GIRALDO  -- Cardiovascular - denies chest pain, no palpitations, denies claudication  -- Gastrointestinal - denies abdominal pain, nausea, vomiting, or diarrhea  -- Genitourinary - no dysuria, no denies flank pain, no hematuria or frequency   -- Musculoskeletal - right back pain, negative for myalgias and arthralgias   -- Neurological - no headache, denies weakness or seizure; no LOC  -- Skin - denies pallor, rash, or changes in skin. no hives or welts noted    Vital  Signs  -- His oral temperature is 95.9 °F (35.5 °C) (abnormal).   -- His blood pressure is 151/92 (abnormal) and his pulse is 100.      Physical Exam  -- Nursing note and vitals reviewed  -- Constitutional: Appears well-developed and well-nourished  -- Head: Atraumatic. Normocephalic. No obvious abnormality  -- Eyes: Pupils are equal and reactive to light. Normal conjunctiva and lids  -- Cardiac: Normal rate, regular rhythm and normal heart sounds  -- Pulmonary: Normal respiratory effort, breath sounds clear to auscultation  -- Abdominal: Soft, no tenderness. Normal bowel sounds. Normal liver edge  -- Genitourinary: no flank pain on exam, no suprapubic pain by palpation   -- Musculoskeletal: Normal range of motion, no effusions. Joints stable   -- Neurological: No focal deficits. Showed good interaction with staff  -- Vascular: Posterior tibial, dorsalis pedis and radial pulses 2+ bilaterally      Emergency Room Course     Treatment and Evaluation  -- The electrolytes drawn in the ER today were within normal limits   -- The CBC drawn in the ER today was within normal limits   -- Urinalysis performed during this ER visit showed no signs of infection   -- The CT stone protocol performed in the ER today was negative for acute pathology   -- PO opiate given in today in the ER    Diagnosis  -- Chronic low back pain without sciatica, unspecified back pain laterality.    Disposition and Plan  -- Disposition: home  -- Condition: stable  -- Follow-up: Patient to follow up with Maynor Benitez MD in 1-2 days.  -- I advised the patient that we have found no life threatening condition today  -- At this time, I believe the patient is clinically stable for discharge.   -- The patient acknowledges that close follow up with a MD is required   -- Patient agrees to comply with all instruction and direction given in the ER    This note is dictated on Dragon Natural Speaking word recognition program.  There are word recognition  mistakes that are occasionally missed on review.             Philippe Luis MD  02/15/17 0189

## 2017-02-17 ENCOUNTER — HOSPITAL ENCOUNTER (EMERGENCY)
Facility: HOSPITAL | Age: 52
Discharge: HOME | End: 2017-02-17
Attending: SURGERY
Payer: MEDICAID

## 2017-02-17 VITALS
TEMPERATURE: 97 F | SYSTOLIC BLOOD PRESSURE: 142 MMHG | HEART RATE: 84 BPM | RESPIRATION RATE: 18 BRPM | OXYGEN SATURATION: 100 % | BODY MASS INDEX: 21.7 KG/M2 | WEIGHT: 169 LBS | DIASTOLIC BLOOD PRESSURE: 87 MMHG

## 2017-02-17 DIAGNOSIS — R10.9 RIGHT FLANK PAIN: Primary | ICD-10-CM

## 2017-02-17 LAB
ALBUMIN SERPL BCP-MCNC: 4 G/DL
ALP SERPL-CCNC: 95 U/L
ALT SERPL W/O P-5'-P-CCNC: 14 U/L
ANION GAP SERPL CALC-SCNC: 9 MMOL/L
AST SERPL-CCNC: 16 U/L
BASOPHILS # BLD AUTO: 0.04 K/UL
BASOPHILS NFR BLD: 0.6 %
BILIRUB SERPL-MCNC: 0.3 MG/DL
BUN SERPL-MCNC: 16 MG/DL
CALCIUM SERPL-MCNC: 9 MG/DL
CHLORIDE SERPL-SCNC: 107 MMOL/L
CO2 SERPL-SCNC: 25 MMOL/L
CREAT SERPL-MCNC: 1.1 MG/DL
DIFFERENTIAL METHOD: NORMAL
EOSINOPHIL # BLD AUTO: 0.2 K/UL
EOSINOPHIL NFR BLD: 2.8 %
ERYTHROCYTE [DISTWIDTH] IN BLOOD BY AUTOMATED COUNT: 12.9 %
EST. GFR  (AFRICAN AMERICAN): >60 ML/MIN/1.73 M^2
EST. GFR  (NON AFRICAN AMERICAN): >60 ML/MIN/1.73 M^2
GLUCOSE SERPL-MCNC: 107 MG/DL
HCT VFR BLD AUTO: 44.3 %
HGB BLD-MCNC: 15.1 G/DL
LYMPHOCYTES # BLD AUTO: 1.2 K/UL
LYMPHOCYTES NFR BLD: 18.3 %
MCH RBC QN AUTO: 30.2 PG
MCHC RBC AUTO-ENTMCNC: 34.1 %
MCV RBC AUTO: 89 FL
MONOCYTES # BLD AUTO: 0.4 K/UL
MONOCYTES NFR BLD: 6.2 %
NEUTROPHILS # BLD AUTO: 4.6 K/UL
NEUTROPHILS NFR BLD: 72.1 %
PLATELET # BLD AUTO: 270 K/UL
PMV BLD AUTO: 10.1 FL
POTASSIUM SERPL-SCNC: 3.6 MMOL/L
PROT SERPL-MCNC: 7.5 G/DL
RBC # BLD AUTO: 5 M/UL
SODIUM SERPL-SCNC: 141 MMOL/L
WBC # BLD AUTO: 6.41 K/UL

## 2017-02-17 PROCEDURE — 85025 COMPLETE CBC W/AUTO DIFF WBC: CPT

## 2017-02-17 PROCEDURE — 63600175 PHARM REV CODE 636 W HCPCS: Performed by: SURGERY

## 2017-02-17 PROCEDURE — 99284 EMERGENCY DEPT VISIT MOD MDM: CPT | Mod: 25

## 2017-02-17 PROCEDURE — 80053 COMPREHEN METABOLIC PANEL: CPT

## 2017-02-17 PROCEDURE — 36415 COLL VENOUS BLD VENIPUNCTURE: CPT

## 2017-02-17 PROCEDURE — 96372 THER/PROPH/DIAG INJ SC/IM: CPT

## 2017-02-17 RX ORDER — PROMETHAZINE HYDROCHLORIDE 25 MG/ML
12.5 INJECTION, SOLUTION INTRAMUSCULAR; INTRAVENOUS
Status: COMPLETED | OUTPATIENT
Start: 2017-02-17 | End: 2017-02-17

## 2017-02-17 RX ORDER — HYDROMORPHONE HYDROCHLORIDE 1 MG/ML
1 INJECTION, SOLUTION INTRAMUSCULAR; INTRAVENOUS; SUBCUTANEOUS
Status: COMPLETED | OUTPATIENT
Start: 2017-02-17 | End: 2017-02-17

## 2017-02-17 RX ADMIN — HYDROMORPHONE HYDROCHLORIDE 1 MG: 1 INJECTION, SOLUTION INTRAMUSCULAR; INTRAVENOUS; SUBCUTANEOUS at 12:02

## 2017-02-17 RX ADMIN — PROMETHAZINE HYDROCHLORIDE 12.5 MG: 25 INJECTION INTRAMUSCULAR; INTRAVENOUS at 12:02

## 2017-02-17 NOTE — ED AVS SNAPSHOT
[facility] [address] [place] [address]               Jett Ballard   2017 12:04 AM   ED    Description:  Male : 1965   Department:  [facility]           Your Care was Coordinated By:     Provider Role From To    Philippe Luis MD Attending Provider 17 0004 --      Reason for Visit     Flank Pain           Diagnoses this Visit        Comments    Right flank pain    -  Primary       ED Disposition     ED Disposition Condition Comment    Discharge             To Do List           Follow-up Information     Follow up with Maynor Benitez MD. Schedule an appointment as soon as possible for a visit in 2 days.    Specialty:  Internal Medicine    Contact information:     [address]  [place] [address]  [phone]        [facility]     [facility] -  Assistance  Registered nurses in the [facility] provide clinical advisement, health education, appointment booking, and other advisory services.  Call for this free service at [phone].             Medications           Message regarding Medications     Verify the changes and/or additions to your medication regime listed below are the same as discussed with your clinician today.  If any of these changes or additions are incorrect, please notify your healthcare provider.        These medications were administered today        Dose Freq    hydromorphone (PF) injection 1 mg 1 mg ED 1 Time    Sig: Inject 1 mL (1 mg total) into the muscle ED 1 Time.    Class: Normal    Route: Intramuscular    promethazine injection 12.5 mg 12.5 mg ED 1 Time    Sig: Inject 0.5 mLs (12.5 mg total) into the muscle ED 1 Time.    Class: Normal    Route: Intramuscular           Verify that the below list of medications is an accurate representation of the medications you are currently taking.  If none reported, the list may be blank. If incorrect, please contact  your healthcare provider. Carry this list with you in case of emergency.           Current Medications     aspirin 81 MG Chew Take 81 mg by mouth once daily.    benazepril (LOTENSIN) 10 MG tablet TAKE ONE-HALF TABLET BY MOUTH ONCE DAILY    benztropine (COGENTIN) 2 MG Tab Take 2 tablets (4 mg total) by mouth 3 (three) times daily.    finasteride (PROSCAR) 5 mg tablet Take 1 tablet (5 mg total) by mouth once daily.    gabapentin (NEURONTIN) 300 MG capsule Take 3 capsules (900 mg total) by mouth 3 (three) times daily.    mupirocin (BACTROBAN) 2 % ointment Apply topically 3 (three) times daily.    oxycodone-acetaminophen (PERCOCET)  mg per tablet Take 1 tablet by mouth every 8 (eight) hours as needed for Pain.    tamsulosin (FLOMAX) 0.4 mg Cp24 Take 1 capsule (0.4 mg total) by mouth once daily.    triamcinolone acetonide 0.1% (KENALOG) 0.1 % cream Apply topically 2 (two) times daily.           Clinical Reference Information           Your Vitals Were     BP Pulse Temp Resp Weight BMI    157/100 (BP Location: Left arm, Patient Position: Sitting) 92 97.6 °F (36.4 °C) (Oral) 18 76.7 kg (169 lb) 21.7 kg/m2      Allergies as of 2/17/2017        Reactions    Adhesive Rash    Latex, Natural Rubber Rash      Immunizations Administered on Date of Encounter - 2/17/2017     None      ED Micro, Lab, POCT     Start Ordered       Status Ordering Provider    02/17/17 0013 02/17/17 0012  CBC auto differential  STAT      In process     02/17/17 0013 02/17/17 0012  Comprehensive metabolic panel  STAT      Final result     02/17/17 0013 02/17/17 0012  Urinalysis  STAT      In process       ED Imaging Orders     Start Ordered       Status Ordering Provider    02/17/17 0017 02/17/17 0017  CT Renal Stone Study ABD Pelvis WO  1 time imaging      In process         Discharge Instructions         Abdominal Pain    Abdominal pain is pain in the stomach or belly area. Everyone has this pain from time to time. In many cases it goes away on  its own. But abdominal pain can sometimes be due to a serious problem, such as appendicitis. So its important to know when to seek help.  Causes of abdominal pain  There are many possible causes of abdominal pain. Common causes in adults include:  · Constipation, diarrhea, or gas  · Stomach acid flowing back up into the esophagus (acid reflux or heartburn)  · Severe acid reflux, called GERD (gastroesophageal reflux disease)  · A sore in the lining of the stomach or small intestine (peptic ulcer)  · Inflammation of the gallbladder, liver, or pancreas  · Gallstones or kidney stones  · Appendicitis   · Intestinal blockage   · An internal organ pushing through a muscle or other tissue (hernia)  · Urinary tract infections  · In women, menstrual cramps, fibroids, or endometriosis  · Inflammation or infection of the intestines  Diagnosing the cause of abdominal pain  Your healthcare provider will do a physical exam help find the cause of your pain. If needed, tests will be ordered. Belly pain has many possible causes. So it can be hard to find the reason for your pain. Giving details about your pain can help. Tell your provider where and when you feel the pain, and what makes it better or worse. Also let your provider know if you have other symptoms such as:  · Fever  · Tiredness  · Upset stomach (nausea)  · Vomiting  · Changes in bathroom habits  Treating abdominal pain  Some causes of pain need emergency medical treatment right away. These include appendicitis or a bowel blockage. Other problems can be treated with rest, fluids, or medicines. Your healthcare provider can give you specific instructions for treatment or self-care based on what is causing your pain.  If you have vomiting or diarrhea, sip water or other clear fluids. When you are ready to eat solid foods again, start with small amounts of easy-to-digest, low-fat foods. These include apple sauce, toast, or crackers.   When to seek medical care  Call 911 or  go to the hospital right away if you:  · Cant pass stool and are vomiting  · Are vomiting blood or have bloody diarrhea or black, tarry diarrhea  · Have chest, neck, or shoulder pain  · Feel like you might pass out  · Have pain in your shoulder blades with nausea  · Have sudden, severe belly pain  · Have new, severe pain unlike any you have felt before  · Have a belly that is rigid, hard, and tender to touch  Call your healthcare provider if you have:  · Pain for more than 5 days  · Bloating for more than 2 days  · Diarrhea for more than 5 days  · A fever of 100.4°F (38.0°C) or higher, or as directed by your provider  · Pain that gets worse  · Weight loss for no reason  · Continued lack of appetite  · Blood in your stool  How to prevent abdominal pain  Here are some tips to help prevent abdominal pain:  · Eat smaller amounts of food at one time.  · Avoid greasy, fried, or other high-fat foods.  · Avoid foods that give you gas.  · Exercise regularly.  · Drink plenty of fluids.  To help prevent GERD symptoms:  · Quit smoking.  · Reduce alcohol and certain foods that increase stomach acid.  · Avoid aspirin and over-the-counter pain and fever medicines (NSAIDS or nonsteroidal anti-inflammatory drugs), if possible  · Lose extra weight.  · Finish eating at least 2 hours before you go to bed or lie down.  · Raise the head of your bed.  Date Last Reviewed: 7/1/2016  © 7021-2499 [employer]. [address] [place] [address]. All rights reserved. This information is not intended as a substitute for professional medical care. Always follow your healthcare professional's instructions.          Your Scheduled Appointments     Feb 17, 2017  8:00 AM CST   Nurse Visit with UROLOGY NURSE, WES Kirby - Urology ([facility])    [address] [facility] [id number] [phone]            Apr 17, 2017  7:30 AM CDT   Fasting Lab with [facility]  ([facility])    [address] [facility] [address]   [phone]            Apr 28, 2017  2:20 PM CDT   Established Patient Visit with MD TERRY Loo - Family Medicine ([facility])    [address] [place] [id number]   [phone]            May 25, 2017  9:30 AM CDT   Established Patient Visit with MARGARITA Ramey - Urology ([facility])    [address] [place] [id number]   [phone]               [facility] complies with applicable Federal civil rights laws and does not discriminate on the basis of race, color, national origin, age, disability, or sex.        Language Assistance Services     ATTENTION: Language assistance services are available, free of charge. Please call [phone].      ATENCIÓN: Si habla español, tiene a mackay disposición servicios gratuitos de asistencia lingüística. Llame al [phone].     CHÚ Ý: N?u b?n nói Ti?ng Vi?t, có các d?ch v? h? tr? ngôn ng? mi?n phí clarah cho b?n. G?i s? [phone].

## 2017-02-17 NOTE — ED PROVIDER NOTES
[facility]                                        February 17, 2017                   Chief Complaint  51 y.o. male with Flank Pain (c/o right flank pain that radiates into right groin)    History of Present Illness  Jett Ballard presents to the emergency room with continued right flank pain  Was seen in the emergency room 2 days ago with right flank pain, negative workup   Patient states he continues to sharp right flank pain that radiates to his groin, no hernia  Patient states he was doing some lifting before this happened, history of low back pain  Patient has no hematuria, dysuria, renal stone history, no history of urinary tract infection  Patient is afebrile and normotensive, states that the right flank pain has recurred tonight    The history is provided by the patient      Past Medical History   -- Hypertension      -- Left sided numbness      -- Leg weakness      -- Other and unspecified hyperlipidemia      -- Positional lightheadedness      -- Stroke      -- Tremor      -- Weakness          Past Surgical History   -- Back surgery             Allergies   -- Adhesive      -- Latex, Natural Rubber        Review of Systems and Physical Exam     Review of Systems  -- Constitution - no fever, denies fatigue, no weakness, no chills  -- Eyes - no tearing or redness, no visual disturbance  -- Ear, Nose - no tinnitus or earache, no nasal congestion or discharge  -- Mouth,Throat - no sore throat, no toothache, normal voice, normal swallowing  -- Respiratory - denies cough and congestion, no shortness of breath, no GIRALDO  -- Cardiovascular - denies chest pain, no palpitations, denies claudication  -- Gastrointestinal - denies abdominal pain, nausea, vomiting, or diarrhea  -- Genitourinary - right flank pain, no hematuria or frequency, no dysuria   -- Musculoskeletal - denies back pain, negative for myalgias and arthralgias   -- Neurological - no headache, denies weakness or seizure; no LOC  --  Skin - denies pallor, rash, or changes in skin. no hives or welts noted    Vital Signs  -- His blood pressure is 142/87 (abnormal) and his pulse is 84.   -- His respiration is 18 and oxygen saturation is 100%.      Physical Exam  -- Nursing note and vitals reviewed  -- Constitutional: Appears well-developed and well-nourished  -- Head: Atraumatic. Normocephalic. No obvious abnormality  -- Eyes: Pupils are equal and reactive to light. Normal conjunctiva and lids  -- Cardiac: Normal rate, regular rhythm and normal heart sounds  -- Pulmonary: Normal respiratory effort, breath sounds clear to auscultation  -- Abdominal: Soft, no tenderness. Normal bowel sounds. Normal liver edge  -- Genitourinary: right flank pain on exam, no suprapubic pain by palpation   -- Musculoskeletal: Normal range of motion, no effusions. Joints stable   -- Neurological: No focal deficits. Showed good interaction with staff    Emergency Room Course     Treatment and Evaluation  -- The CT stone protocol performed in the ER today was negative for acute pathology   -- The electrolytes drawn in the ER today were within normal limits   -- The CBC drawn in the ER today was within normal limits   -- IM Opiates given today in the ER  -- IM Phenergan given today in the ER    Diagnosis  -- The encounter diagnosis was Right flank pain.    Disposition and Plan  -- Disposition: home  -- Condition: stable  -- Follow-up: Patient to follow up with Maynor Bneitez MD in 1-2 days.  -- I advised the patient that we have found no life threatening condition today  -- At this time, I believe the patient is clinically stable for discharge.   -- The patient acknowledges that close follow up with a MD is required   -- Patient agrees to comply with all instruction and direction given in the ER    This note is dictated on Dragon [employer] word recognition program.  There are word recognition mistakes that are occasionally missed on review.             Philippe WATERMAN  MD Toby  02/17/17 0600

## 2017-02-17 NOTE — DISCHARGE INSTRUCTIONS
Abdominal Pain    Abdominal pain is pain in the stomach or belly area. Everyone has this pain from time to time. In many cases it goes away on its own. But abdominal pain can sometimes be due to a serious problem, such as appendicitis. So its important to know when to seek help.  Causes of abdominal pain  There are many possible causes of abdominal pain. Common causes in adults include:  · Constipation, diarrhea, or gas  · Stomach acid flowing back up into the esophagus (acid reflux or heartburn)  · Severe acid reflux, called GERD (gastroesophageal reflux disease)  · A sore in the lining of the stomach or small intestine (peptic ulcer)  · Inflammation of the gallbladder, liver, or pancreas  · Gallstones or kidney stones  · Appendicitis   · Intestinal blockage   · An internal organ pushing through a muscle or other tissue (hernia)  · Urinary tract infections  · In women, menstrual cramps, fibroids, or endometriosis  · Inflammation or infection of the intestines  Diagnosing the cause of abdominal pain  Your healthcare provider will do a physical exam help find the cause of your pain. If needed, tests will be ordered. Belly pain has many possible causes. So it can be hard to find the reason for your pain. Giving details about your pain can help. Tell your provider where and when you feel the pain, and what makes it better or worse. Also let your provider know if you have other symptoms such as:  · Fever  · Tiredness  · Upset stomach (nausea)  · Vomiting  · Changes in bathroom habits  Treating abdominal pain  Some causes of pain need emergency medical treatment right away. These include appendicitis or a bowel blockage. Other problems can be treated with rest, fluids, or medicines. Your healthcare provider can give you specific instructions for treatment or self-care based on what is causing your pain.  If you have vomiting or diarrhea, sip water or other clear fluids. When you are ready to eat solid foods again,  start with small amounts of easy-to-digest, low-fat foods. These include apple sauce, toast, or crackers.   When to seek medical care  Call 911 or go to the hospital right away if you:  · Cant pass stool and are vomiting  · Are vomiting blood or have bloody diarrhea or black, tarry diarrhea  · Have chest, neck, or shoulder pain  · Feel like you might pass out  · Have pain in your shoulder blades with nausea  · Have sudden, severe belly pain  · Have new, severe pain unlike any you have felt before  · Have a belly that is rigid, hard, and tender to touch  Call your healthcare provider if you have:  · Pain for more than 5 days  · Bloating for more than 2 days  · Diarrhea for more than 5 days  · A fever of 100.4°F (38.0°C) or higher, or as directed by your provider  · Pain that gets worse  · Weight loss for no reason  · Continued lack of appetite  · Blood in your stool  How to prevent abdominal pain  Here are some tips to help prevent abdominal pain:  · Eat smaller amounts of food at one time.  · Avoid greasy, fried, or other high-fat foods.  · Avoid foods that give you gas.  · Exercise regularly.  · Drink plenty of fluids.  To help prevent GERD symptoms:  · Quit smoking.  · Reduce alcohol and certain foods that increase stomach acid.  · Avoid aspirin and over-the-counter pain and fever medicines (NSAIDS or nonsteroidal anti-inflammatory drugs), if possible  · Lose extra weight.  · Finish eating at least 2 hours before you go to bed or lie down.  · Raise the head of your bed.  Date Last Reviewed: 7/1/2016  © 5005-3790 [employer]. [address] [place] [address], [place] [address]. All rights reserved. This information is not intended as a substitute for professional medical care. Always follow your healthcare professional's instructions.

## 2017-03-15 ENCOUNTER — HOSPITAL ENCOUNTER (EMERGENCY)
Facility: HOSPITAL | Age: 52
Discharge: HOME | End: 2017-03-15
Attending: FAMILY MEDICINE
Payer: MEDICAID

## 2017-03-15 VITALS
WEIGHT: 185 LBS | RESPIRATION RATE: 16 BRPM | TEMPERATURE: 99 F | HEIGHT: 74 IN | HEART RATE: 72 BPM | SYSTOLIC BLOOD PRESSURE: 170 MMHG | DIASTOLIC BLOOD PRESSURE: 100 MMHG | BODY MASS INDEX: 23.74 KG/M2

## 2017-03-15 DIAGNOSIS — R53.1 WEAKNESS: ICD-10-CM

## 2017-03-15 LAB
ALBUMIN SERPL BCP-MCNC: 4 G/DL
ALP SERPL-CCNC: 79 U/L
ALT SERPL W/O P-5'-P-CCNC: 20 U/L
ANION GAP SERPL CALC-SCNC: 10 MMOL/L
AST SERPL-CCNC: 17 U/L
BASOPHILS # BLD AUTO: 0.02 K/UL
BASOPHILS NFR BLD: 0.3 %
BILIRUB SERPL-MCNC: 0.4 MG/DL
BUN SERPL-MCNC: 11 MG/DL
CALCIUM SERPL-MCNC: 9.6 MG/DL
CHLORIDE SERPL-SCNC: 103 MMOL/L
CO2 SERPL-SCNC: 27 MMOL/L
CREAT SERPL-MCNC: 0.9 MG/DL
DIFFERENTIAL METHOD: ABNORMAL
EOSINOPHIL # BLD AUTO: 0.1 K/UL
EOSINOPHIL NFR BLD: 0.8 %
ERYTHROCYTE [DISTWIDTH] IN BLOOD BY AUTOMATED COUNT: 12.8 %
EST. GFR  (AFRICAN AMERICAN): >60 ML/MIN/1.73 M^2
EST. GFR  (NON AFRICAN AMERICAN): >60 ML/MIN/1.73 M^2
GLUCOSE SERPL-MCNC: 90 MG/DL
HCT VFR BLD AUTO: 45.3 %
HGB BLD-MCNC: 15.1 G/DL
INR PPP: 1
LYMPHOCYTES # BLD AUTO: 1.1 K/UL
LYMPHOCYTES NFR BLD: 17.9 %
MCH RBC QN AUTO: 29.8 PG
MCHC RBC AUTO-ENTMCNC: 33.3 %
MCV RBC AUTO: 90 FL
MONOCYTES # BLD AUTO: 0.4 K/UL
MONOCYTES NFR BLD: 6.2 %
NEUTROPHILS # BLD AUTO: 4.7 K/UL
NEUTROPHILS NFR BLD: 74.8 %
PLATELET # BLD AUTO: 280 K/UL
PMV BLD AUTO: 9.6 FL
POTASSIUM SERPL-SCNC: 3.8 MMOL/L
PROT SERPL-MCNC: 7.8 G/DL
PROTHROMBIN TIME: 9.8 SEC
RBC # BLD AUTO: 5.06 M/UL
SODIUM SERPL-SCNC: 140 MMOL/L
TROPONIN I SERPL DL<=0.01 NG/ML-MCNC: <0.006 NG/ML
WBC # BLD AUTO: 6.27 K/UL

## 2017-03-15 PROCEDURE — 36415 COLL VENOUS BLD VENIPUNCTURE: CPT

## 2017-03-15 PROCEDURE — 93010 ELECTROCARDIOGRAM REPORT: CPT | Mod: ,,, | Performed by: INTERNAL MEDICINE

## 2017-03-15 PROCEDURE — 85025 COMPLETE CBC W/AUTO DIFF WBC: CPT

## 2017-03-15 PROCEDURE — 93005 ELECTROCARDIOGRAM TRACING: CPT

## 2017-03-15 PROCEDURE — 84484 ASSAY OF TROPONIN QUANT: CPT

## 2017-03-15 PROCEDURE — 85610 PROTHROMBIN TIME: CPT

## 2017-03-15 PROCEDURE — 99285 EMERGENCY DEPT VISIT HI MDM: CPT

## 2017-03-15 PROCEDURE — 80053 COMPREHEN METABOLIC PANEL: CPT

## 2017-03-15 NOTE — ED AVS SNAPSHOT
[facility]  [facility] [address]               Jett Ballard   3/15/2017  8:24 PM   ED    Description:  Male : 1965   Department:  [facility]           Your Care was Coordinated By:     Provider Role From To    Rony Gresham MD Attending Provider 03/15/17 2022 --      Reason for Visit     Numbness           Diagnoses this Visit        Comments    Weakness         Weakness           ED Disposition     ED Disposition Condition Comment    Discharge             To Do List           Follow-up Information     Follow up with Maynor Benitez MD. Schedule an appointment as soon as possible for a visit in 1 day.    Specialty:  Internal Medicine    Why:  If symptoms worsen    Contact information:     [address]  [place] [address]  [phone]        [facility]     [facility] Care [facility] -  Assistance  Registered nurses in the [facility] provide clinical advisement, health education, appointment booking, and other advisory services.  Call for this free service at [phone].             Medications           Message regarding Medications     Verify the changes and/or additions to your medication regime listed below are the same as discussed with your clinician today.  If any of these changes or additions are incorrect, please notify your healthcare provider.             Verify that the below list of medications is an accurate representation of the medications you are currently taking.  If none reported, the list may be blank. If incorrect, please contact your healthcare provider. Carry this list with you in case of emergency.           Current Medications     aspirin 81 MG Chew Take 81 mg by mouth once daily.    benazepril (LOTENSIN) 10 MG tablet TAKE ONE-HALF TABLET BY MOUTH ONCE DAILY    benztropine (COGENTIN) 2 MG Tab Take 2 tablets (4 mg total) by mouth 3 (three) times daily.    finasteride  "(PROSCAR) 5 mg tablet Take 1 tablet (5 mg total) by mouth once daily.    gabapentin (NEURONTIN) 300 MG capsule Take 3 capsules (900 mg total) by mouth 3 (three) times daily.    oxycodone-acetaminophen (PERCOCET)  mg per tablet Take 1 tablet by mouth every 8 (eight) hours as needed for Pain.    tamsulosin (FLOMAX) 0.4 mg Cp24 Take 1 capsule (0.4 mg total) by mouth once daily.    triamcinolone acetonide 0.1% (KENALOG) 0.1 % cream Apply topically 2 (two) times daily.           Clinical Reference Information           Your Vitals Were     BP Pulse Temp Resp Height Weight    176/102 (BP Location: Left arm, Patient Position: Lying) 77 98.5 °F (36.9 °C) (Oral) 16 6' 2" (1.88 m) 83.9 kg (185 lb)    BMI                23.75 kg/m2          Allergies as of 3/15/2017        Reactions    Adhesive Rash    Latex, Natural Rubber Rash      Immunizations Administered on Date of Encounter - 3/15/2017     None      ED Micro, Lab, POCT     Start Ordered       Status Ordering Provider    03/15/17 2035 03/15/17 2035  CBC auto differential  STAT      Final result     03/15/17 2035 03/15/17 2035  Comprehensive metabolic panel  STAT      Final result     03/15/17 2035 03/15/17 2035  Troponin I  STAT      Final result     03/15/17 2035 03/15/17 2035  Protime-INR  STAT      Final result       ED Imaging Orders     Start Ordered       Status Ordering Provider    03/15/17 2035 03/15/17 2035  CT Head Without Contrast  1 time imaging      In process     03/15/17 2035 03/15/17 2035  X-Ray Chest 1 View  1 time imaging      In process         Discharge Instructions         Weakness (Uncertain Cause)  Based on your exam today, the exact cause of your weakness is not certain. However, your weakness does not seem to be a sign of a serious illness at this time. Keep an eye on your symptoms and get medical advice as instructed below.  Home care  · Rest at home today. Do not over-exert yourself.  · Take any medicine as prescribed.  · For the next few " days, drink extra fluids (unless your healthcare provider wants you to restrict fluids for other reasons). Do not skip meals.  Follow-up care  Follow up with your healthcare provider or as advised.  When to seek medical advice  Call your healthcare provider for any of the following  · Worsening of your symptoms  · Symptoms don't start getting better within 2 days  · Fever of 100.4º F (38º C) or higher, or as directed by your healthcare provider·    Call 911  Get emergency medical care for any of these:  · Chest, arm, neck, jaw or upper back pain  · Trouble breathing  · Numbness or weakness of the face, one arm or one leg  · Slurred speech, confusion, trouble speaking, walking or seeing  · Blood in vomit or stool (black or red color)  · Loss of consciousness  Date Last Reviewed: 6/10/2015  © 4820-9832 [employer]. [address] [place] [address]. All rights reserved. This information is not intended as a substitute for professional medical care. Always follow your healthcare professional's instructions.          Your Scheduled Appointments     Apr 17, 2017  7:30 AM CDT   Fasting Lab with [facility]    [address] [facility] [address]   [phone]            Apr 28, 2017  2:20 PM CDT   Established Patient Visit with MD TERRY Loo - Family Medicine ([facility])    [address] [place] [id number]   [phone]            May 25, 2017  9:30 AM CDT   Established Patient Visit with MARGARITA Ramey - Urology ([facility])    [address] [place] [id number]   [phone]               [facility] complies with applicable Federal civil rights laws and does not discriminate on the basis of race, color, national origin, age, disability, or sex.        Language Assistance Services     ATTENTION: Language assistance services are  available, free of charge. Please call [phone].      ATENCIÓN: Si habla español, tiene a mackay disposición servicios gratuitos de asistencia lingüística. Llame al [phone].     CHÚ Ý: N?u b?n nói Ti?ng Vi?t, có các d?ch v? h? tr? ngôn ng? mi?n phí dành cho b?n. G?i s? [phone].

## 2017-03-16 NOTE — ED TRIAGE NOTES
"Assumed care of 51 y.o. male presents to ER ED 05/ED 05   Chief Complaint   Patient presents with    Numbness     to the right side and was unable to walk, brought in by [facility]    as per triage nurse. Pt with a history of TIA from 2 years ago and residual Left-sided "weakness and memory problems". Pt was eating dinner and "became weak and numb and nauseated". No emesis.  "

## 2017-03-16 NOTE — ED PROVIDER NOTES
Encounter Date: 3/15/2017       History     Chief Complaint   Patient presents with    Numbness     to the right side and was unable to walk, brought in by [facility]     Review of patient's allergies indicates:   Allergen Reactions    Adhesive Rash    Latex, natural rubber Rash     Patient is a 51 y.o. male presenting with the following complaint: general illness. The history is provided by the patient. No  was used.   General Illness    The current episode started just prior to arrival. The problem has been resolved. Nothing relieves the symptoms. Nothing aggravates the symptoms. Pertinent negatives include no fever, no nausea, no vomiting, no muscle aches, no cough, no shortness of breath and no rash. He has received no recent medical care.     Patient was feeling fine earlier today when this evening he developed episodes of nausea and lightheadedness and a right frontal headache.  He didn't fill diffuse numbness.  Reportedly had difficulty walking.  He has no neurologic complaints at this time.  No fever or chills.  Past Medical History:   Diagnosis Date    Hypertension     Left sided numbness     Leg weakness     Other and unspecified hyperlipidemia     Positional lightheadedness     Stroke     Tremor     Weakness      Past Surgical History:   Procedure Laterality Date    BACK SURGERY       Family History   Problem Relation Age of Onset    Heart disease Father     Cancer Father      Social History   Substance Use Topics    Smoking status: Never Smoker    Smokeless tobacco: Current User     Types: Snuff      Comment: 33 yr history of dip    Alcohol use No     Review of Systems   Constitutional: Negative for fever.   Respiratory: Negative for cough and shortness of breath.    Gastrointestinal: Negative for nausea and vomiting.   Skin: Negative for rash.       Physical Exam   Initial Vitals   BP Pulse Resp Temp SpO2   03/15/17 2034 03/15/17 2034 03/15/17 2034 03/15/17 2034 --    176/102 77 16 98.5 °F (36.9 °C)      Physical Exam    Nursing note and vitals reviewed.  Constitutional: He appears well-developed and well-nourished.   Alert responsive individual in no acute distress.   HENT:   Head: Normocephalic.   Right Ear: External ear normal.   Left Ear: External ear normal.   Nose: Nose normal.   Mouth/Throat: Oropharynx is clear and moist.   Eyes: Conjunctivae and EOM are normal. Pupils are equal, round, and reactive to light.   Neck: Normal range of motion. Neck supple.   Cardiovascular: Normal rate, regular rhythm and normal heart sounds.   Pulmonary/Chest: Breath sounds normal.   Abdominal: Soft. Bowel sounds are normal.   Musculoskeletal: Normal range of motion.   Neurological: He is alert and oriented to person, place, and time. He displays normal reflexes. No cranial nerve deficit or sensory deficit. GCS eye subscore is 4. GCS verbal subscore is 5. GCS motor subscore is 6.   No arm drift.  Normal upper extremity motor strength as well as normal extremity motor strength.   Skin: Skin is warm and dry.   Psychiatric: He has a normal mood and affect.         ED Course   Procedures  Labs Reviewed   CBC W/ AUTO DIFFERENTIAL   COMPREHENSIVE METABOLIC PANEL   TROPONIN I   PROTIME-INR     EKG Readings: (Independently Interpreted)   Rhythm: Normal Sinus Rhythm. Ectopy: No Ectopy. Conduction: Normal. ST Segments: Normal ST Segments. T Waves: Normal. Clinical Impression: Normal Sinus Rhythm                            ED Course     Clinical Impression:   Diagnoses of Weakness and Weakness were pertinent to this visit.          Rony Gresham MD  03/15/17 [id number]

## 2017-03-16 NOTE — DISCHARGE INSTRUCTIONS
Weakness (Uncertain Cause)  Based on your exam today, the exact cause of your weakness is not certain. However, your weakness does not seem to be a sign of a serious illness at this time. Keep an eye on your symptoms and get medical advice as instructed below.  Home care  · Rest at home today. Do not over-exert yourself.  · Take any medicine as prescribed.  · For the next few days, drink extra fluids (unless your healthcare provider wants you to restrict fluids for other reasons). Do not skip meals.  Follow-up care  Follow up with your healthcare provider or as advised.  When to seek medical advice  Call your healthcare provider for any of the following  · Worsening of your symptoms  · Symptoms don't start getting better within 2 days  · Fever of 100.4º F (38º C) or higher, or as directed by your healthcare provider·    Call 911  Get emergency medical care for any of these:  · Chest, arm, neck, jaw or upper back pain  · Trouble breathing  · Numbness or weakness of the face, one arm or one leg  · Slurred speech, confusion, trouble speaking, walking or seeing  · Blood in vomit or stool (black or red color)  · Loss of consciousness  Date Last Reviewed: 6/10/2015  © 3608-1725 [employer]. [address] [place] [address], [place] [address]. All rights reserved. This information is not intended as a substitute for professional medical care. Always follow your healthcare professional's instructions.

## 2017-03-16 NOTE — PROVIDER PROGRESS NOTES - EMERGENCY DEPT.
Encounter Date: 3/15/2017    ED Physician Progress Notes        Physician Note:   Patient is feeling much better.  He has no weakness disorientation.  Feels good.  He will follow with Dr. Whalen if other symptoms reoccur or return to the ED.

## 2018-01-03 ENCOUNTER — OFFICE VISIT (OUTPATIENT)
Dept: NEUROLOGY | Facility: CLINIC | Age: 53
End: 2018-01-03
Payer: MEDICARE

## 2018-01-03 VITALS
HEART RATE: 76 BPM | RESPIRATION RATE: 17 BRPM | HEIGHT: 74 IN | BODY MASS INDEX: 24.08 KG/M2 | DIASTOLIC BLOOD PRESSURE: 90 MMHG | WEIGHT: 187.63 LBS | SYSTOLIC BLOOD PRESSURE: 130 MMHG

## 2018-01-03 DIAGNOSIS — M21.241 FLEXION DEFORMITY OF FINGER JOINT OF RIGHT HAND: ICD-10-CM

## 2018-01-03 DIAGNOSIS — M48.02 CERVICAL STENOSIS OF SPINAL CANAL: Primary | ICD-10-CM

## 2018-01-03 DIAGNOSIS — I69.30 LATE EFFECT OF CEREBROVASCULAR ACCIDENT (CVA): ICD-10-CM

## 2018-01-03 DIAGNOSIS — R25.1 TREMOR: ICD-10-CM

## 2018-01-03 PROCEDURE — 99214 OFFICE O/P EST MOD 30 MIN: CPT | Mod: S$PBB | Performed by: PSYCHIATRY & NEUROLOGY

## 2018-01-03 PROCEDURE — 99999 PR PBB SHADOW E&M-EST. PATIENT-LVL IV: CPT | Mod: PBBFAC,,, | Performed by: PSYCHIATRY & NEUROLOGY

## 2018-01-03 PROCEDURE — 99999 PR STA SHADOW: CPT | Mod: PBBFAC,,, | Performed by: PSYCHIATRY & NEUROLOGY

## 2018-01-03 PROCEDURE — 99214 OFFICE O/P EST MOD 30 MIN: CPT | Mod: PBBFAC | Performed by: PSYCHIATRY & NEUROLOGY

## 2018-01-03 RX ORDER — PRIMIDONE 50 MG/1
TABLET ORAL
Qty: 60 TABLET | Refills: 5 | Status: SHIPPED | OUTPATIENT
Start: 2018-01-03 | End: 2018-05-14 | Stop reason: SDUPTHER

## 2018-01-03 RX ORDER — PRAVASTATIN SODIUM 10 MG/1
10 TABLET ORAL NIGHTLY
Qty: 90 TABLET | Refills: 3 | Status: SHIPPED | OUTPATIENT
Start: 2018-01-03 | End: 2018-04-25

## 2018-01-03 NOTE — PROGRESS NOTES
"    HPI: Jett Ballard is a 52 y.o. male  with PMHx of Right thalamic CVA (5/15) and was previously followed by an outside neurologist, Dr Lorenzo for tremors and memory loss since then.  He also has known cervical spinal stenosis. He was seen by me at [facility] for CV[facility]  Tremors were thought to be , "Still possibly somatization as symptoms worsened with increase in life stressors"  He was subsequently referred to psychiatry by neurology- states his mood is not currently depressed. Note several brain imaging procedures since CVA- all unremarkable. He has seen vascular neurology and lipid lowering agents were recommended prior.   Patient state he has has tremors since the CVA. This involves both hands and not present at rest. He notes shaking more with reaching for items. There is no family history of tremor. Cogentin helps with tremor- he takes at [facility] but continues to note tremor. He is not sure who prescribed cogentin- he thinks PCP.   His greatest complaint is neck pain which radiates to the shoulders. PCP prescribes percocet and gabapentin for neck pain as well.   He states his memory was impaired after stroke and continues to have short term memory loss.   He does not drive. He has had continued left sided weakness since the CVA and requires cane use.  He has a chronic right 5th finger flexion which he states occurred after heaving lifting at home.   He has episodic headaches since the CVA which responds to fioricet.   Review of Systems   Constitutional: Negative for fever.   HENT: Negative for nosebleeds.    Respiratory: Negative for hemoptysis.    Cardiovascular: Negative for leg swelling.   Gastrointestinal: Negative for blood in stool.   Genitourinary: Negative for hematuria.   Musculoskeletal: Positive for neck pain.   Skin: Negative for rash.   Neurological: Positive for tremors.   Psychiatric/Behavioral: The patient does not have insomnia.          I have reviewed all of this patient's past medical and " surgical histories as well as family and social histories and active allergies and medications as documented in the electronic medical record.        Exam:  Gen Appearance, well developed/nourished in no apparent distress  CV: 2+ distal pulses with no edema or swelling  Neuro:  MS: Awake, alert, oriented to place, person, time, situation. Sustains attention but mildly slurred speech since CVA is noted. Recent/remote memory intact, Language is full to spontaneous speech/repetition/naming/comprehension. Fund of Knowledge is full  CN: Optic discs are flat with normal vasculature, PERRL, Extraoccular movements and visual fields are full. Normal facial sensation and strength, Hearing symmetric, Tongue and Palate are midline and strong. Shoulder Shrug symmetric and strong.  Motor: Normal bulk, tone, no abnormal movements at rest, but tremor of both hands with activity and movement. 5/5 strength bilateral upper/lower extremities with 2+ reflexes and no clonus  Right fifth finger is contracted.   Sensory: symmetric to light touch, pain, temp, and vibration except decreased to temp on the left side.  Romberg negative  Cerebellar: Finger-nose,Heal-shin, Rapid alternating movements intact  Gait: Normal stance, no ataxia- using cane due to mild left hemiparesis from CVA    Imaging:  3/2017 CT head: No acute intracranial process.   No significant change.  2/2017 MRI brain: 1.  No MRI evidence of an acute intracranial abnormality.  2.  Minimal stable early small vessel ischemic changes.  4/2016 MRI C spine: The study is significantly degraded by motion artifact with bulging of the C4-5 disc suspected resulting in effacement of ventral subarachnoid space and with mild/moderate flattening of the ventral cord and overall mild degree of canal stenosis.  Small left paracentral protrusion of the C6-7 disc noted with effacement of ventral subarachnoid space and question of mild flattening of the left ventral cord a low with mild bulging  of the C5-6 disc suspected.  ______________________________________     4/2016 MRI Brain: N no evidence of acute infarction.  No appreciable change as compared to the MRI 06/10/2015 with several small nonspecific T2 hyperintensities evident in the white matter which may reflect small nonspecific areas of gliosis or perhaps very mild microvascular ischemic change including subtle somewhat linear appearing T2 hyperintense intensity adjacent to head and body of caudate on the left.  ______________________________________     MRI brain 2015: No evidence of acute infarction.      Small nonspecific area of T2 hyperintensity adjacent to left head of caudate may reflect nonspecific area of gliosis with additional punctate focus nonspecific T2 hyperintensity in the left parietal subcortical white matter    2015 MRA brain: Punctate acute/subacute infarction involving the right thalamus without associated hemorrhage, mass-effect or midline shift.    Age-appropriate generalized cerebral volume loss with mild degree of chronic microvascular ischemic disease    2015 CTA head: Temporal evolution of the previously noted punctate right thalamic infarction with minimal hypodensity now visualized in this location.  Otherwise, unremarkable  CT of the head specifically without evidence for acute hemorrhage or abnormal parenchymal   enhancement.    Unremarkable CTA of the head specifically without evidence for focal stenosis or intracranial aneurysm.    5/2015 Carotid US: #1. Findings consistent with less than 50% stenosis in the Right carotid artery bulb.    #2. Findings consistent with less than 50% stenosis in the Left carotid artery bulb.    9/2015 EEG: IMPRESSION: This is a normal awake and drowsy EEG. It is   important to note   that patients with epilepsy may have a normal EEG. Clinical   correlation is   therefore necessary.    EMG 2016: This is a normal study of bilateral upper and lower   extremities without   significant  evidence of neuropathy or radiculopathy.    Labs:  4/2017    Assessment/Plan: Jett Ballard is a 52 y.o. male  with PMHx of Right thalamic CVA (5/15) and was previously followed by an outside neurologist, Dr Lorenzo for tremors and memory loss since then.    I recommend:   1. Regarding tremor post CVA: Cogentin helps. Add low dose primidone per orders Unless sedation, mood changes or other side effects. Watch for sedation if fioricet (used for episodic headaches since CVA) is also used.   2. Patient does not drive since CVA- should continue off driving.   3. Goal LDL for CVA prevention is less than 100 in this patient. Reviewed multiple prior recommendations for starting statin. He thinks he never got script for pravastatin: Start low dose per orders unless side effects. Will need CMP and lipids at next visit.   4. Also for CVA prevention: continue ASA started since the event and anti-HTN meds.   5. He also has known mild cervical spinal stenosis by 2016 MRI C spine. EMG 4 extremity normal 2016 with Dr Lorenzo. Refer to pain management for neck pain management. PCP prescribes percocet and gabapentin for neck pain as well.   6. Refer to orthopedist for chronic right fifth finger flexion.   RTC in 4 months

## 2018-01-08 ENCOUNTER — HOSPITAL ENCOUNTER (EMERGENCY)
Facility: HOSPITAL | Age: 53
Discharge: HOME | End: 2018-01-08
Attending: FAMILY MEDICINE
Payer: MEDICARE

## 2018-01-08 VITALS
HEART RATE: 95 BPM | WEIGHT: 186 LBS | SYSTOLIC BLOOD PRESSURE: 161 MMHG | TEMPERATURE: 99 F | BODY MASS INDEX: 23.87 KG/M2 | RESPIRATION RATE: 17 BRPM | HEIGHT: 74 IN | DIASTOLIC BLOOD PRESSURE: 95 MMHG | OXYGEN SATURATION: 98 %

## 2018-01-08 DIAGNOSIS — R05.9 COUGH: ICD-10-CM

## 2018-01-08 DIAGNOSIS — J40 BRONCHITIS: Primary | ICD-10-CM

## 2018-01-08 LAB
ALBUMIN SERPL BCP-MCNC: 4.4 G/DL
ALP SERPL-CCNC: 89 U/L
ALT SERPL W/O P-5'-P-CCNC: 17 U/L
ANION GAP SERPL CALC-SCNC: 10 MMOL/L
AST SERPL-CCNC: 23 U/L
BASOPHILS # BLD AUTO: 0.01 K/UL
BASOPHILS NFR BLD: 0.3 %
BILIRUB SERPL-MCNC: 0.4 MG/DL
BUN SERPL-MCNC: 9 MG/DL
CALCIUM SERPL-MCNC: 9.2 MG/DL
CHLORIDE SERPL-SCNC: 99 MMOL/L
CO2 SERPL-SCNC: 28 MMOL/L
CREAT SERPL-MCNC: 1 MG/DL
DIFFERENTIAL METHOD: ABNORMAL
EOSINOPHIL # BLD AUTO: 0 K/UL
EOSINOPHIL NFR BLD: 0.6 %
ERYTHROCYTE [DISTWIDTH] IN BLOOD BY AUTOMATED COUNT: 13.1 %
EST. GFR  (AFRICAN AMERICAN): >60 ML/MIN/1.73 M^2
EST. GFR  (NON AFRICAN AMERICAN): >60 ML/MIN/1.73 M^2
FLUAV AG SPEC QL IA: NEGATIVE
FLUBV AG SPEC QL IA: NEGATIVE
GLUCOSE SERPL-MCNC: 93 MG/DL
HCT VFR BLD AUTO: 47.4 %
HGB BLD-MCNC: 15.6 G/DL
LACTATE SERPL-SCNC: 0.5 MMOL/L
LYMPHOCYTES # BLD AUTO: 0.5 K/UL
LYMPHOCYTES NFR BLD: 15.9 %
MCH RBC QN AUTO: 30.2 PG
MCHC RBC AUTO-ENTMCNC: 32.9 G/DL
MCV RBC AUTO: 92 FL
MONOCYTES # BLD AUTO: 0.7 K/UL
MONOCYTES NFR BLD: 20.4 %
NEUTROPHILS # BLD AUTO: 2.1 K/UL
NEUTROPHILS NFR BLD: 62.8 %
PLATELET # BLD AUTO: 215 K/UL
PMV BLD AUTO: 9.7 FL
POTASSIUM SERPL-SCNC: 3.8 MMOL/L
PROT SERPL-MCNC: 8.2 G/DL
RBC # BLD AUTO: 5.16 M/UL
SODIUM SERPL-SCNC: 137 MMOL/L
SPECIMEN SOURCE: NORMAL
WBC # BLD AUTO: 3.33 K/UL

## 2018-01-08 PROCEDURE — 25000003 PHARM REV CODE 250: Performed by: FAMILY MEDICINE

## 2018-01-08 PROCEDURE — 87400 INFLUENZA A/B EACH AG IA: CPT

## 2018-01-08 PROCEDURE — 85025 COMPLETE CBC W/AUTO DIFF WBC: CPT

## 2018-01-08 PROCEDURE — 83605 ASSAY OF LACTIC ACID: CPT

## 2018-01-08 PROCEDURE — 99284 EMERGENCY DEPT VISIT MOD MDM: CPT

## 2018-01-08 PROCEDURE — 80053 COMPREHEN METABOLIC PANEL: CPT

## 2018-01-08 PROCEDURE — 36415 COLL VENOUS BLD VENIPUNCTURE: CPT

## 2018-01-08 RX ORDER — DOXYCYCLINE HYCLATE 100 MG
100 TABLET ORAL
Status: COMPLETED | OUTPATIENT
Start: 2018-01-08 | End: 2018-01-08

## 2018-01-08 RX ORDER — DOXYCYCLINE 100 MG/1
100 CAPSULE ORAL 2 TIMES DAILY
Qty: 20 CAPSULE | Refills: 0 | Status: SHIPPED | OUTPATIENT
Start: 2018-01-08 | End: 2018-01-18 | Stop reason: ALTCHOICE

## 2018-01-08 RX ADMIN — DOXYCYCLINE HYCLATE 100 MG: 100 TABLET, COATED ORAL at 09:01

## 2018-01-09 NOTE — PROVIDER PROGRESS NOTES - EMERGENCY DEPT.
Encounter Date: 1/8/2018    ED Physician Progress Notes           Laboratory results x-ray discussed with the patient.  He is feeling somewhat better.

## 2018-01-09 NOTE — ED PROVIDER NOTES
Encounter Date: 1/8/2018       History     Chief Complaint   Patient presents with    URI     Onset 3 days ago      The history is provided by the patient. No  was used.   Cough   This is a new problem. The current episode started several days ago. The problem occurs every few minutes. The cough is non-productive. There has been no fever. Associated symptoms include shortness of breath. Pertinent negatives include no chest pain, no chills, no sweats, no weight loss, no ear congestion, no ear pain, no headaches, no rhinorrhea, no sore throat, no myalgias, no wheezing and no eye redness. He has tried decongestants for the symptoms. He is not a smoker.     Patient also 3 days ago of cough and congestion.  Tonight the cough became much more intense and he felt some shortness of breath.  No chest pain.  No diaphoresis.  He does not smoke cigarettes.  No fever or chills.  No nausea or vomiting.    Review of patient's allergies indicates:   Allergen Reactions    Adhesive Rash    Latex, natural rubber Rash     Past Medical History:   Diagnosis Date    Hypertension     Left sided numbness     Leg weakness     Other and unspecified hyperlipidemia     Positional lightheadedness     Stroke     Tremor     Weakness      Past Surgical History:   Procedure Laterality Date    BACK SURGERY       Family History   Problem Relation Age of Onset    Heart disease Father     Cancer Father      Social History   Substance Use Topics    Smoking status: Never Smoker    Smokeless tobacco: Current User     Types: Snuff      Comment: 33 yr history of dip    Alcohol use No     Review of Systems   Constitutional: Negative for chills and weight loss.   HENT: Negative for ear pain, rhinorrhea and sore throat.    Eyes: Negative for redness.   Respiratory: Positive for cough and shortness of breath. Negative for wheezing.    Cardiovascular: Negative for chest pain.   Musculoskeletal: Negative for myalgias.    Neurological: Negative for headaches.       Physical Exam     Initial Vitals [01/08/18 1835]   BP Pulse Resp Temp SpO2   (!) 161/95 95 17 99 °F (37.2 °C) 98 %      MAP       117         Physical Exam    Nursing note and vitals reviewed.  Constitutional: He appears well-developed and well-nourished.   HENT:   Head: Normocephalic.   Right Ear: External ear normal.   Left Ear: External ear normal.   Nose: Nose normal.   Mouth/Throat: Oropharynx is clear and moist.   Eyes: Conjunctivae and EOM are normal. Pupils are equal, round, and reactive to light.   Neck: Normal range of motion. Neck supple.   Cardiovascular: Normal rate, regular rhythm and normal heart sounds.   Pulmonary/Chest: He has rhonchi.   Musculoskeletal: Normal range of motion.   Neurological: He is alert and oriented to person, place, and time.   Skin: Skin is warm and dry.   Psychiatric: He has a normal mood and affect.         ED Course   Procedures  Labs Reviewed   INFLUENZA A AND B ANTIGEN   CBC W/ AUTO DIFFERENTIAL   COMPREHENSIVE METABOLIC PANEL   LACTIC ACID, PLASMA                               ED Course      Clinical Impression:   The primary encounter diagnosis was Bronchitis. A diagnosis of Cough was also pertinent to this visit.                           Rony Gresham MD  01/08/18 2043

## 2018-01-09 NOTE — ED NOTES
Phlebotomy at the bedside for blood specimen collection  Flu swab obtained from bilateral nares. Pt tolerated procedure without incident.

## 2018-01-09 NOTE — ED TRIAGE NOTES
52 y.o. male presents to ER ED 03 /ED 03A   Chief Complaint   Patient presents with    URI     Onset 3 days ago    . No acute distress noted.

## 2018-01-18 ENCOUNTER — TELEPHONE (OUTPATIENT)
Dept: PAIN MEDICINE | Facility: CLINIC | Age: 53
End: 2018-01-18

## 2018-01-18 ENCOUNTER — HOSPITAL ENCOUNTER (EMERGENCY)
Facility: HOSPITAL | Age: 53
Discharge: HOME | End: 2018-01-18
Attending: EMERGENCY MEDICINE
Payer: MEDICARE

## 2018-01-18 VITALS
OXYGEN SATURATION: 97 % | HEART RATE: 107 BPM | WEIGHT: 186 LBS | RESPIRATION RATE: 20 BRPM | DIASTOLIC BLOOD PRESSURE: 80 MMHG | TEMPERATURE: 98 F | SYSTOLIC BLOOD PRESSURE: 154 MMHG | BODY MASS INDEX: 23.88 KG/M2

## 2018-01-18 DIAGNOSIS — J40 BRONCHITIS: Primary | ICD-10-CM

## 2018-01-18 DIAGNOSIS — R05.9 COUGH: ICD-10-CM

## 2018-01-18 LAB
ALBUMIN SERPL BCP-MCNC: 3.7 G/DL
ALP SERPL-CCNC: 76 U/L
ALT SERPL W/O P-5'-P-CCNC: 9 U/L
ANION GAP SERPL CALC-SCNC: 10 MMOL/L
AST SERPL-CCNC: 14 U/L
BASOPHILS # BLD AUTO: 0.01 K/UL
BASOPHILS NFR BLD: 0.1 %
BILIRUB SERPL-MCNC: 0.6 MG/DL
BUN SERPL-MCNC: 12 MG/DL
CALCIUM SERPL-MCNC: 9.9 MG/DL
CHLORIDE SERPL-SCNC: 101 MMOL/L
CO2 SERPL-SCNC: 27 MMOL/L
CREAT SERPL-MCNC: 0.8 MG/DL
DIFFERENTIAL METHOD: ABNORMAL
EOSINOPHIL # BLD AUTO: 0 K/UL
EOSINOPHIL NFR BLD: 0.4 %
ERYTHROCYTE [DISTWIDTH] IN BLOOD BY AUTOMATED COUNT: 12.4 %
EST. GFR  (AFRICAN AMERICAN): >60 ML/MIN/1.73 M^2
EST. GFR  (NON AFRICAN AMERICAN): >60 ML/MIN/1.73 M^2
FLUAV AG SPEC QL IA: NEGATIVE
FLUBV AG SPEC QL IA: NEGATIVE
GLUCOSE SERPL-MCNC: 100 MG/DL
HCT VFR BLD AUTO: 43 %
HGB BLD-MCNC: 14.6 G/DL
LYMPHOCYTES # BLD AUTO: 0.6 K/UL
LYMPHOCYTES NFR BLD: 5.1 %
MCH RBC QN AUTO: 30.2 PG
MCHC RBC AUTO-ENTMCNC: 34 G/DL
MCV RBC AUTO: 89 FL
MONOCYTES # BLD AUTO: 0.8 K/UL
MONOCYTES NFR BLD: 7.8 %
NEUTROPHILS # BLD AUTO: 9.3 K/UL
NEUTROPHILS NFR BLD: 86.6 %
PLATELET # BLD AUTO: 379 K/UL
PMV BLD AUTO: 9.7 FL
POTASSIUM SERPL-SCNC: 3.8 MMOL/L
PROT SERPL-MCNC: 8.1 G/DL
RBC # BLD AUTO: 4.83 M/UL
SODIUM SERPL-SCNC: 138 MMOL/L
SPECIMEN SOURCE: NORMAL
WBC # BLD AUTO: 10.75 K/UL

## 2018-01-18 PROCEDURE — 87070 CULTURE OTHR SPECIMN AEROBIC: CPT

## 2018-01-18 PROCEDURE — 87205 SMEAR GRAM STAIN: CPT

## 2018-01-18 PROCEDURE — 80053 COMPREHEN METABOLIC PANEL: CPT

## 2018-01-18 PROCEDURE — 87400 INFLUENZA A/B EACH AG IA: CPT

## 2018-01-18 PROCEDURE — 85025 COMPLETE CBC W/AUTO DIFF WBC: CPT

## 2018-01-18 PROCEDURE — 99284 EMERGENCY DEPT VISIT MOD MDM: CPT

## 2018-01-18 PROCEDURE — 36415 COLL VENOUS BLD VENIPUNCTURE: CPT

## 2018-01-18 RX ORDER — MOXIFLOXACIN HYDROCHLORIDE 400 MG/1
400 TABLET ORAL DAILY
Qty: 19 TABLET | Refills: 0 | Status: SHIPPED | OUTPATIENT
Start: 2018-01-18 | End: 2018-01-24 | Stop reason: ALTCHOICE

## 2018-01-18 RX ORDER — DOXYCYCLINE 100 MG/1
100 CAPSULE ORAL 2 TIMES DAILY WITH MEALS
Qty: 20 CAPSULE | Refills: 0 | Status: SHIPPED | OUTPATIENT
Start: 2018-01-18 | End: 2018-01-18 | Stop reason: CLARIF

## 2018-01-18 RX ORDER — BENZONATATE 100 MG/1
200 CAPSULE ORAL 4 TIMES DAILY PRN
Qty: 20 CAPSULE | Refills: 1 | Status: SHIPPED | OUTPATIENT
Start: 2018-01-18 | End: 2018-01-24 | Stop reason: ALTCHOICE

## 2018-01-18 NOTE — ED PROVIDER NOTES
[facility]                                                  Chief Complaint  52 y.o. male with Cough    History of Present Illness  Jett Ballard presents to the emergency room with complaints of persistent productive cough.  Patient reports that he has had this cough for 3 weeks.  He was previously swabbed for the flu with a negative result.  He is concerned that he has bronchitis or pneumonia.  He has not taken anything for symptoms.  He is not a smoker.  He denies chest pain.  He denies shortness of breath.      Past Medical History:   Diagnosis Date    Hypertension     Left sided numbness     Leg weakness     Other and unspecified hyperlipidemia     Positional lightheadedness     Stroke     Tremor     Weakness      Past Surgical History:   Procedure Laterality Date    BACK SURGERY        Review of patient's allergies indicates:   Allergen Reactions    Adhesive Rash    Latex, natural rubber Rash        Review of Systems and Physical Exam     Review of Systems  -- Constitution - no fever, reports fatigue, no weakness, no chills  -- Eyes - no tearing or redness, no visual disturbance  -- Ear, Nose - no tinnitus or earache, no nasal congestion or discharge  -- Mouth,Throat - no sore throat, no toothache, normal voice, normal swallowing  -- Respiratory - reports productive cough and congestion, no shortness of breath, no wheezing  -- Cardiovascular - denies chest pain, no palpitations, denies claudication  -- Gastrointestinal - denies abdominal pain, nausea, vomiting, or diarrhea  -- Genitourinary - no dysuria, no denies flank pain, no hematuria or frequency   -- Musculoskeletal - denies back pain, negative for myalgias and arthralgias   -- Neurological - no headache, denies weakness or seizure; no LOC  -- Skin - denies pallor, rash, or changes in skin. no hives or welts noted    Vital Signs   weight is 84.4 kg (186 lb). His oral temperature is 98.3 °F (36.8 °C). His blood pressure  is 154/80 (abnormal) and his pulse is 107. His respiration is 20 and oxygen saturation is 97%.      Physical Exam  -- Nursing note and vitals reviewed  -- Constitutional: Appears well-developed and well-nourished  -- Head: Atraumatic. Normocephalic. No obvious abnormality  -- Eyes: Pupils are equal and reactive to light. Normal conjunctiva and lids  -- Nose: Nose normal in appearance, nares grossly normal. No discharge  -- Throat: Mucous membranes moist, pharynx normal, normal tonsils. No lesions   -- Ears: External ears and TM normal bilaterally. Normal hearing and no drainage  -- Neck: Normal range of motion. Neck supple. No masses, trachea midline  -- Cardiac: Normal rate, regular rhythm and normal heart sounds  -- Pulmonary: Normal respiratory effort, breath sounds clear to auscultation  -- Abdominal: Soft, no tenderness. Normal bowel sounds. Normal liver edge  -- Musculoskeletal: Normal range of motion, no effusions. Joints stable   -- Neurological: No focal deficits. Showed good interaction with staff  -- Vascular: Posterior tibial, dorsalis pedis and radial pulses 2+ bilaterally    -- Lymphatics: No cervical or peripheral lymphadenopathy. No edema noted  -- Skin: Warm and dry. No evidence of rash or cellulitis  -- Psychiatric: Normal mood and affect. Bedside behavior is appropriate    Emergency Room Course     Treatment and Evaluation  1. PE unremarkable  2. Influenza pending  3. CBC/CMP pending  4. Chest xray pending  5. Care transitioned to Dr Argueta at 1800 for results and dispo    Abnormal lab values  Labs Reviewed   CULTURE, RESPIRATORY   INFLUENZA A AND B ANTIGEN   CBC W/ AUTO DIFFERENTIAL   COMPREHENSIVE METABOLIC PANEL         Diagnosis  -- The encounter diagnosis was Cough.    Disposition and Plan  -- Disposition:   -- Condition:   -- Follow-up:      Jake Argueta MD  01/18/18 1851       Jake Argueta MD  01/18/18 1859

## 2018-01-18 NOTE — ED TRIAGE NOTES
52 y.o. male presents to ER   Chief Complaint   Patient presents with    Cough   Pt reports cough for two weeks. No acute distress noted.

## 2018-01-22 LAB
BACTERIA SPEC AEROBE CULT: NORMAL
GRAM STN SPEC: NORMAL

## 2018-01-24 ENCOUNTER — HOSPITAL ENCOUNTER (EMERGENCY)
Facility: HOSPITAL | Age: 53
Discharge: HOME | End: 2018-01-25
Attending: EMERGENCY MEDICINE
Payer: MEDICARE

## 2018-01-24 VITALS
SYSTOLIC BLOOD PRESSURE: 161 MMHG | RESPIRATION RATE: 18 BRPM | TEMPERATURE: 98 F | OXYGEN SATURATION: 100 % | HEART RATE: 71 BPM | DIASTOLIC BLOOD PRESSURE: 97 MMHG | HEIGHT: 74 IN | WEIGHT: 187 LBS | BODY MASS INDEX: 24 KG/M2

## 2018-01-24 DIAGNOSIS — R42 DIZZY: ICD-10-CM

## 2018-01-24 DIAGNOSIS — I69.993 CVA, OLD, ATAXIA: Primary | ICD-10-CM

## 2018-01-24 LAB
ALBUMIN SERPL BCP-MCNC: 3.7 G/DL
ALP SERPL-CCNC: 78 U/L
ALT SERPL W/O P-5'-P-CCNC: 11 U/L
ANION GAP SERPL CALC-SCNC: 8 MMOL/L
AST SERPL-CCNC: 18 U/L
BASOPHILS # BLD AUTO: 0.06 K/UL
BASOPHILS NFR BLD: 1.1 %
BILIRUB SERPL-MCNC: 0.2 MG/DL
BILIRUB UR QL STRIP: NEGATIVE
BUN SERPL-MCNC: 10 MG/DL
CALCIUM SERPL-MCNC: 8.9 MG/DL
CHLORIDE SERPL-SCNC: 105 MMOL/L
CLARITY UR: CLEAR
CO2 SERPL-SCNC: 27 MMOL/L
COLOR UR: YELLOW
CREAT SERPL-MCNC: 0.9 MG/DL
DIFFERENTIAL METHOD: ABNORMAL
EOSINOPHIL # BLD AUTO: 0.3 K/UL
EOSINOPHIL NFR BLD: 4.6 %
ERYTHROCYTE [DISTWIDTH] IN BLOOD BY AUTOMATED COUNT: 12.6 %
EST. GFR  (AFRICAN AMERICAN): >60 ML/MIN/1.73 M^2
EST. GFR  (NON AFRICAN AMERICAN): >60 ML/MIN/1.73 M^2
GLUCOSE SERPL-MCNC: 79 MG/DL
GLUCOSE UR QL STRIP: NEGATIVE
HCT VFR BLD AUTO: 41.6 %
HGB BLD-MCNC: 13.4 G/DL
HGB UR QL STRIP: NEGATIVE
KETONES UR QL STRIP: NEGATIVE
LEUKOCYTE ESTERASE UR QL STRIP: NEGATIVE
LYMPHOCYTES # BLD AUTO: 1.3 K/UL
LYMPHOCYTES NFR BLD: 23.4 %
MCH RBC QN AUTO: 29.6 PG
MCHC RBC AUTO-ENTMCNC: 32.2 G/DL
MCV RBC AUTO: 92 FL
MONOCYTES # BLD AUTO: 0.4 K/UL
MONOCYTES NFR BLD: 7.1 %
NEUTROPHILS # BLD AUTO: 3.4 K/UL
NEUTROPHILS NFR BLD: 63.8 %
NITRITE UR QL STRIP: NEGATIVE
PH UR STRIP: 6 [PH] (ref 5–8)
PLATELET # BLD AUTO: 389 K/UL
PMV BLD AUTO: 9.2 FL
POTASSIUM SERPL-SCNC: 3.9 MMOL/L
PROT SERPL-MCNC: 7.3 G/DL
PROT UR QL STRIP: NEGATIVE
RBC # BLD AUTO: 4.52 M/UL
SODIUM SERPL-SCNC: 140 MMOL/L
SP GR UR STRIP: 1.01 (ref 1–1.03)
URN SPEC COLLECT METH UR: NORMAL
UROBILINOGEN UR STRIP-ACNC: NEGATIVE EU/DL
WBC # BLD AUTO: 5.38 K/UL

## 2018-01-24 PROCEDURE — 36415 COLL VENOUS BLD VENIPUNCTURE: CPT

## 2018-01-24 PROCEDURE — 85025 COMPLETE CBC W/AUTO DIFF WBC: CPT

## 2018-01-24 PROCEDURE — 81003 URINALYSIS AUTO W/O SCOPE: CPT | Mod: 59

## 2018-01-24 PROCEDURE — 80053 COMPREHEN METABOLIC PANEL: CPT

## 2018-01-24 PROCEDURE — 99285 EMERGENCY DEPT VISIT HI MDM: CPT

## 2018-01-24 PROCEDURE — 80307 DRUG TEST PRSMV CHEM ANLYZR: CPT

## 2018-01-24 PROCEDURE — 93005 ELECTROCARDIOGRAM TRACING: CPT

## 2018-01-24 PROCEDURE — 93010 ELECTROCARDIOGRAM REPORT: CPT | Mod: ,,, | Performed by: INTERNAL MEDICINE

## 2018-01-25 LAB
AMPHET+METHAMPHET UR QL: NEGATIVE
BARBITURATES UR QL SCN>200 NG/ML: NORMAL
BENZODIAZ UR QL SCN>200 NG/ML: NEGATIVE
BZE UR QL SCN: NEGATIVE
CANNABINOIDS UR QL SCN: NEGATIVE
CREAT UR-MCNC: 91.6 MG/DL
METHADONE UR QL SCN>300 NG/ML: NEGATIVE
OPIATES UR QL SCN: NEGATIVE
PCP UR QL SCN>25 NG/ML: NEGATIVE
TOXICOLOGY INFORMATION: NORMAL

## 2018-01-25 NOTE — ED TRIAGE NOTES
52/w/m c/o dizziness and sluggish speech with sudden onset approx 20 minutes ago while doing dishes.  States increased difficulty walking.  States has some deficits secondary to 2 past CVAs.  Last one was two years prior.

## 2018-01-25 NOTE — PROVIDER PROGRESS NOTES - EMERGENCY DEPT.
Encounter Date: 1/24/2018  Review of labs and CAT scan and x-rays showed no acute findings.  ED Physician Progress Notes

## 2018-01-25 NOTE — ED PROVIDER NOTES
Encounter Date: 1/24/2018       History     Chief Complaint   Patient presents with    Dizziness     This 52-year-old man was brought to the emergency room because of onset of dizziness and unsteady gait.  He's had 2 strokes and they're concerned that he might of had another.  He is alert and cooperative and able to give a good history.  He denies any fever cough cold chest pain or shortness of breath.  He is appetite is been good.  He is drinking enough liquids.          Review of patient's allergies indicates:   Allergen Reactions    Adhesive Rash    Latex, natural rubber Rash     Past Medical History:   Diagnosis Date    Hypercholesteremia     Hypertension     Left sided numbness     Leg weakness     Other and unspecified hyperlipidemia     Positional lightheadedness     Stroke     Tremor     Weakness      Past Surgical History:   Procedure Laterality Date    BACK SURGERY       Family History   Problem Relation Age of Onset    Heart disease Father     Cancer Father      Social History   Substance Use Topics    Smoking status: Never Smoker    Smokeless tobacco: Current User     Types: Snuff      Comment: 33 yr history of dip    Alcohol use No     Review of Systems   Neurological: Positive for dizziness.   All other systems reviewed and are negative.      Physical Exam     Initial Vitals [01/24/18 2254]   BP Pulse Resp Temp SpO2   (!) 161/97 71 18 97.7 °F (36.5 °C) 100 %      MAP       118.33         Physical Exam    Nursing note and vitals reviewed.  Constitutional: He appears well-developed and well-nourished.   HENT:   Mouth/Throat: Oropharynx is clear and moist.   Eyes: Conjunctivae are normal. Pupils are equal, round, and reactive to light.   Neck: Normal range of motion. Neck supple.   Cardiovascular: Normal heart sounds.   Pulmonary/Chest: Breath sounds normal.   Abdominal: Bowel sounds are normal.   Musculoskeletal: Normal range of motion. He exhibits no edema.   Neurological: He is alert  and oriented to person, place, and time. He has normal strength and normal reflexes. No cranial nerve deficit.   Skin: Skin is warm and dry.   Psychiatric: He has a normal mood and affect.         ED Course   Procedures  Labs Reviewed   CBC W/ AUTO DIFFERENTIAL - Abnormal; Notable for the following:        Result Value    RBC 4.52 (*)     Hemoglobin 13.4 (*)     Platelets 389 (*)     All other components within normal limits   COMPREHENSIVE METABOLIC PANEL   URINALYSIS   DRUG SCREEN PANEL, URINE EMERGENCY                               ED Course      Clinical Impression:   The primary encounter diagnosis was CVA, old, ataxia. A diagnosis of Dizzy was also pertinent to this visit.    Disposition:   Disposition: Discharged  Condition: Stable                        Jake Argueta MD  01/25/18 0110       Jake Argueta MD  01/25/18 0114

## 2018-04-25 PROBLEM — K21.9 GASTROESOPHAGEAL REFLUX DISEASE WITHOUT ESOPHAGITIS: Status: ACTIVE | Noted: 2018-04-25

## 2018-05-14 ENCOUNTER — OFFICE VISIT (OUTPATIENT)
Dept: NEUROLOGY | Facility: CLINIC | Age: 53
End: 2018-05-14
Payer: MEDICARE

## 2018-05-14 VITALS
HEART RATE: 108 BPM | BODY MASS INDEX: 22.15 KG/M2 | WEIGHT: 172.63 LBS | RESPIRATION RATE: 18 BRPM | HEIGHT: 74 IN | SYSTOLIC BLOOD PRESSURE: 114 MMHG | DIASTOLIC BLOOD PRESSURE: 82 MMHG

## 2018-05-14 DIAGNOSIS — I69.30 LATE EFFECT OF CEREBROVASCULAR ACCIDENT (CVA): ICD-10-CM

## 2018-05-14 DIAGNOSIS — R25.1 TREMOR: Primary | ICD-10-CM

## 2018-05-14 DIAGNOSIS — M21.241 FLEXION DEFORMITY OF FINGER JOINT OF RIGHT HAND: ICD-10-CM

## 2018-05-14 PROCEDURE — 99999 PR PBB SHADOW E&M-EST. PATIENT-LVL III: CPT | Mod: PBBFAC,,, | Performed by: PSYCHIATRY & NEUROLOGY

## 2018-05-14 PROCEDURE — 99213 OFFICE O/P EST LOW 20 MIN: CPT | Mod: PBBFAC | Performed by: PSYCHIATRY & NEUROLOGY

## 2018-05-14 PROCEDURE — 99999 PR STA SHADOW: CPT | Mod: PBBFAC,,, | Performed by: PSYCHIATRY & NEUROLOGY

## 2018-05-14 PROCEDURE — 99214 OFFICE O/P EST MOD 30 MIN: CPT | Mod: S$PBB | Performed by: PSYCHIATRY & NEUROLOGY

## 2018-05-14 RX ORDER — PRIMIDONE 50 MG/1
TABLET ORAL
Qty: 60 TABLET | Refills: 5 | Status: SHIPPED | OUTPATIENT
Start: 2018-05-14 | End: 2018-06-20

## 2018-05-14 NOTE — PATIENT INSTRUCTIONS
Reduce Cogentin (Benztropine) to twice daily for 2 weeks, then once daily for 2 weeks, then stop.    Notify us if you are not on Primidone (Mysoline) at home.       Follow up with Dr Pineda about your right pinky finger.

## 2018-05-14 NOTE — TELEPHONE ENCOUNTER
----- Message from Felipe Rashid MD sent at 5/14/2018 10:56 AM CDT -----  Contact: Patient  Please contact patient's pharmacy to see if they still have script on file. If so, please ask patient to report to fill this.  If not, please que a refill of primidone.    Felipe Rashid MD    ----- Message -----  From: Karo Garcia LPN  Sent: 5/14/2018  10:30 AM  To: Felipe Rashid MD    Patient states he does not have Primidone that was discussed in today's visit.

## 2018-05-14 NOTE — PROGRESS NOTES
HPI: Jett Ballard is a 52 y.o. male  with PMHx of Right thalamic CVA (5/15) and was previously followed by an outside neurologist, Dr Lorenzo for tremors and memory loss since then.    Since the last visit, he reported to his PCP spells of light headedness- PCP's note reviewed.  PCP changed some meds in response to this.  Patient reports feeling light headed if going from sitting to standing too quickly. This has been occurring since this stroke.  Tremor is about the same overall.  He has been noting some no-no tremor at times.  Sitting at rest resolves the tremors but reaching and holding things cause tremor.  His family members have no tremor.  Primidone  Was given at the last visit for tremor, but he is uncertain if he is taking this.   Patient never did follow up with pain management since the last visit- PCP is filling meds. Last PCP note regarding his ? compliance is reviewed.   He did have xray with Dr Pineda in 1/2018 about his chronic right finger flexion      Review of Systems   Constitutional: Negative for fever.   Eyes: Negative for double vision.   Respiratory: Negative for hemoptysis.    Cardiovascular: Negative for leg swelling.   Gastrointestinal: Negative for blood in stool.   Genitourinary: Negative for hematuria.   Musculoskeletal: Positive for neck pain.   Skin: Negative for rash.   Neurological: Positive for tremors.   Psychiatric/Behavioral: The patient does not have insomnia.          I have reviewed all of this patient's past medical and surgical histories as well as family and social histories and active allergies and medications as documented in the electronic medical record.        Exam:  Gen Appearance, well developed/nourished in no apparent distress  CV: 2+ distal pulses with no edema or swelling  Neuro:  MS: Awake, alert, Sustains attention but mildly slurred speech since CVA is noted. Recent/remote memory intact, Language is full to spontaneous speech//comprehension. Fund of  Knowledge is full  CN: Optic discs are flat with normal vasculature, PERRL, Extraoccular movements and visual fields are full. Normal facial sensation and strength, Hearing symmetric, Tongue and Palate are midline and strong. Shoulder Shrug symmetric and strong.  Motor: Normal bulk, tone, no abnormal movements at rest, but tremor of both hands with activity and movement. 5/5 strength bilateral upper/lower extremities with 2+ reflexes and no clonus  Right fifth finger is contracted.   Sensory: symmetric to light touch, pain, temp, and vibration except decreased to temp on the left side chronically.  Romberg negative  Cerebellar: Finger-nose,Heal-shin, Rapid alternating movements intact  Gait: Normal stance, no ataxia- using cane due to mild left hemiparesis from CVA    Imaging:  3/2017 CT head: No acute intracranial process.   No significant change.  2/2017 MRI brain: 1.  No MRI evidence of an acute intracranial abnormality.  2.  Minimal stable early small vessel ischemic changes.  4/2016 MRI C spine: The study is significantly degraded by motion artifact with bulging of the C4-5 disc suspected resulting in effacement of ventral subarachnoid space and with mild/moderate flattening of the ventral cord and overall mild degree of canal stenosis.  Small left paracentral protrusion of the C6-7 disc noted with effacement of ventral subarachnoid space and question of mild flattening of the left ventral cord a low with mild bulging of the C5-6 disc suspected.  ______________________________________     4/2016 MRI Brain: N no evidence of acute infarction.  No appreciable change as compared to the MRI 06/10/2015 with several small nonspecific T2 hyperintensities evident in the white matter which may reflect small nonspecific areas of gliosis or perhaps very mild microvascular ischemic change including subtle somewhat linear appearing T2 hyperintense intensity adjacent to head and body of caudate on the  left.  ______________________________________     MRI brain 2015: No evidence of acute infarction.      Small nonspecific area of T2 hyperintensity adjacent to left head of caudate may reflect nonspecific area of gliosis with additional punctate focus nonspecific T2 hyperintensity in the left parietal subcortical white matter    2015 MRA brain: Punctate acute/subacute infarction involving the right thalamus without associated hemorrhage, mass-effect or midline shift.    Age-appropriate generalized cerebral volume loss with mild degree of chronic microvascular ischemic disease    2015 CTA head: Temporal evolution of the previously noted punctate right thalamic infarction with minimal hypodensity now visualized in this location.  Otherwise, unremarkable  CT of the head specifically without evidence for acute hemorrhage or abnormal parenchymal   enhancement.    Unremarkable CTA of the head specifically without evidence for focal stenosis or intracranial aneurysm.    5/2015 Carotid US: #1. Findings consistent with less than 50% stenosis in the Right carotid artery bulb.    #2. Findings consistent with less than 50% stenosis in the Left carotid artery bulb.    9/2015 EEG: IMPRESSION: This is a normal awake and drowsy EEG. It is   important to note   that patients with epilepsy may have a normal EEG. Clinical   correlation is   therefore necessary.    EMG 2016: This is a normal study of bilateral upper and lower   extremities without   significant evidence of neuropathy or radiculopathy.    1.2018 CT head: Unremarkable noncontrast CT head specifically without evidence for acute intracranial hemorrhage. Further evaluation as warrented clinically.    2.2017 MRI brain: 1.  No MRI evidence of an acute intracranial abnormality.  2.  Minimal stable early small vessel ischemic changes.      Xray right finger noted (subluxation deformity)  Labs: LDL less than 100 4/2018    Assessment/Plan: Jett Ballard is a 52 y.o. male  with  PMHx of Right thalamic CVA (5/15) and was previously followed by an outside neurologist, Dr Lorenzo for tremors and memory loss since then.    I recommend:   1. He reports some chronic symptoms of dizziness with standing. I am concerned this is an orthostatic  Side effect of Cogentin  2. Advised he reduce Cogentin to BID for 2 weeks, then once daily for 2 weeks, then stop if light headedness still persists.   3. He is not sure if he started primidone: Notify us if a new Rx is needed to start this medication for further treatment of tremor. Tremor looks more like benign essential tremor at this point- monitor. Most recent brain imaging has been unremarkable  4. He uses Fioricet PRN for headache and pain medications via PCP (who is currently monitoring his compliance/use of this)  5. Patient does not drive since CVA- should continue off driving.   6. Goal LDL for CVA prevention is less than 100 in this patient. Statin recently stopped by PCP  7. Also for CVA prevention: continue ASA started since the event and anti-HTN meds (BP at goal today)  8. He also has known mild cervical spinal stenosis by 2016 MRI C spine. EMG 4 extremity normal 2016 with Dr Lorenzo. PCP fills pain medications  6. Referred to orthopedist for chronic right fifth finger flexion- suggested he follow up with Dr Pineda for his subluxation deformity by xray ordered by that MD.   RTC in 2 months

## 2018-05-26 ENCOUNTER — HOSPITAL ENCOUNTER (EMERGENCY)
Facility: HOSPITAL | Age: 53
Discharge: HOME | End: 2018-05-26
Attending: EMERGENCY MEDICINE
Payer: MEDICARE

## 2018-05-26 VITALS
WEIGHT: 177 LBS | TEMPERATURE: 96 F | DIASTOLIC BLOOD PRESSURE: 89 MMHG | SYSTOLIC BLOOD PRESSURE: 145 MMHG | BODY MASS INDEX: 22.73 KG/M2 | HEART RATE: 62 BPM | RESPIRATION RATE: 16 BRPM

## 2018-05-26 DIAGNOSIS — R53.1 WEAKNESS: ICD-10-CM

## 2018-05-26 DIAGNOSIS — I69.30 HISTORY OF CVA WITH RESIDUAL DEFICIT: Primary | ICD-10-CM

## 2018-05-26 DIAGNOSIS — G45.9 TRANSIENT CEREBRAL ISCHEMIA, UNSPECIFIED TYPE: ICD-10-CM

## 2018-05-26 LAB
ALBUMIN SERPL BCP-MCNC: 4.1 G/DL
ALP SERPL-CCNC: 64 U/L
ALT SERPL W/O P-5'-P-CCNC: 8 U/L
AMPHET+METHAMPHET UR QL: NEGATIVE
ANION GAP SERPL CALC-SCNC: 8 MMOL/L
AST SERPL-CCNC: 14 U/L
BARBITURATES UR QL SCN>200 NG/ML: NEGATIVE
BASOPHILS # BLD AUTO: 0.02 K/UL
BASOPHILS NFR BLD: 0.4 %
BENZODIAZ UR QL SCN>200 NG/ML: NEGATIVE
BILIRUB SERPL-MCNC: 0.7 MG/DL
BILIRUB UR QL STRIP: NEGATIVE
BNP SERPL-MCNC: 26 PG/ML
BUN SERPL-MCNC: 10 MG/DL
BZE UR QL SCN: NEGATIVE
CALCIUM SERPL-MCNC: 9.6 MG/DL
CANNABINOIDS UR QL SCN: NEGATIVE
CHLORIDE SERPL-SCNC: 103 MMOL/L
CLARITY UR: CLEAR
CO2 SERPL-SCNC: 29 MMOL/L
COLOR UR: YELLOW
CREAT SERPL-MCNC: 0.8 MG/DL
CREAT UR-MCNC: 55.8 MG/DL
DIFFERENTIAL METHOD: ABNORMAL
EOSINOPHIL # BLD AUTO: 0.1 K/UL
EOSINOPHIL NFR BLD: 1 %
ERYTHROCYTE [DISTWIDTH] IN BLOOD BY AUTOMATED COUNT: 12.8 %
EST. GFR  (AFRICAN AMERICAN): >60 ML/MIN/1.73 M^2
EST. GFR  (NON AFRICAN AMERICAN): >60 ML/MIN/1.73 M^2
GLUCOSE SERPL-MCNC: 104 MG/DL
GLUCOSE UR QL STRIP: NEGATIVE
HCT VFR BLD AUTO: 42.8 %
HGB BLD-MCNC: 14.4 G/DL
HGB UR QL STRIP: NEGATIVE
KETONES UR QL STRIP: NEGATIVE
LEUKOCYTE ESTERASE UR QL STRIP: NEGATIVE
LYMPHOCYTES # BLD AUTO: 0.9 K/UL
LYMPHOCYTES NFR BLD: 17.5 %
MCH RBC QN AUTO: 30.3 PG
MCHC RBC AUTO-ENTMCNC: 33.6 G/DL
MCV RBC AUTO: 90 FL
METHADONE UR QL SCN>300 NG/ML: NEGATIVE
MONOCYTES # BLD AUTO: 0.3 K/UL
MONOCYTES NFR BLD: 6.4 %
NEUTROPHILS # BLD AUTO: 3.7 K/UL
NEUTROPHILS NFR BLD: 74.7 %
NITRITE UR QL STRIP: NEGATIVE
OPIATES UR QL SCN: NEGATIVE
PCP UR QL SCN>25 NG/ML: NEGATIVE
PH UR STRIP: 7 [PH] (ref 5–8)
PLATELET # BLD AUTO: 239 K/UL
PMV BLD AUTO: 9.9 FL
POTASSIUM SERPL-SCNC: 3.1 MMOL/L
PROT SERPL-MCNC: 7.2 G/DL
PROT UR QL STRIP: NEGATIVE
RBC # BLD AUTO: 4.76 M/UL
SODIUM SERPL-SCNC: 140 MMOL/L
SP GR UR STRIP: 1.01 (ref 1–1.03)
TOXICOLOGY INFORMATION: NORMAL
TROPONIN I SERPL DL<=0.01 NG/ML-MCNC: <0.006 NG/ML
URN SPEC COLLECT METH UR: NORMAL
UROBILINOGEN UR STRIP-ACNC: NEGATIVE EU/DL
WBC # BLD AUTO: 4.98 K/UL

## 2018-05-26 PROCEDURE — 80053 COMPREHEN METABOLIC PANEL: CPT

## 2018-05-26 PROCEDURE — 84484 ASSAY OF TROPONIN QUANT: CPT

## 2018-05-26 PROCEDURE — 80307 DRUG TEST PRSMV CHEM ANLYZR: CPT

## 2018-05-26 PROCEDURE — 85025 COMPLETE CBC W/AUTO DIFF WBC: CPT

## 2018-05-26 PROCEDURE — 99284 EMERGENCY DEPT VISIT MOD MDM: CPT | Mod: 25

## 2018-05-26 PROCEDURE — 93005 ELECTROCARDIOGRAM TRACING: CPT

## 2018-05-26 PROCEDURE — 36415 COLL VENOUS BLD VENIPUNCTURE: CPT

## 2018-05-26 PROCEDURE — 83880 ASSAY OF NATRIURETIC PEPTIDE: CPT

## 2018-05-26 PROCEDURE — 93010 ELECTROCARDIOGRAM REPORT: CPT | Mod: ,,, | Performed by: INTERNAL MEDICINE

## 2018-05-26 PROCEDURE — 81003 URINALYSIS AUTO W/O SCOPE: CPT | Mod: 59

## 2018-05-26 NOTE — ED PROVIDER NOTES
Encounter Date: 5/26/2018       History     Chief Complaint   Patient presents with    Fatigue     This 52-year-old male with a history of stroke in the past with left-sided numbness and weakness had onset of worsening slurred speech and feels like he may have had another stroke.  He denies any shortness of breath headache chest pain or stiff neck.  Symptoms began about an hour ago          Review of patient's allergies indicates:   Allergen Reactions    Adhesive Rash    Latex, natural rubber Rash     Past Medical History:   Diagnosis Date    Hypercholesteremia     Hypertension     Left sided numbness     Leg weakness     Other and unspecified hyperlipidemia     Positional lightheadedness     Stroke     Tremor     Weakness      Past Surgical History:   Procedure Laterality Date    BACK SURGERY       Family History   Problem Relation Age of Onset    Heart disease Father     Cancer Father      Social History   Substance Use Topics    Smoking status: Never Smoker    Smokeless tobacco: Current User     Types: Snuff      Comment: 33 yr history of dip    Alcohol use No     Review of Systems   Neurological: Positive for weakness.   All other systems reviewed and are negative.      Physical Exam     Initial Vitals [05/26/18 0230]   BP Pulse Resp Temp SpO2   (!) 171/97 75 16 96.3 °F (35.7 °C) --      MAP       121.67         Physical Exam    Nursing note and vitals reviewed.  Constitutional: He appears well-developed.   HENT:   Head: Normocephalic and atraumatic.   Mouth/Throat: Oropharynx is clear and moist.   Eyes: Conjunctivae are normal. Pupils are equal, round, and reactive to light.   Neck: Normal range of motion. Neck supple.   Cardiovascular: Normal heart sounds and intact distal pulses.   Pulmonary/Chest: Breath sounds normal.   Abdominal: Soft.   Musculoskeletal: Normal range of motion. He exhibits no edema.   Neurological: He is alert and oriented to person, place, and time. He has normal  strength.   Skin: Skin is warm and dry.         ED Course   Procedures  Labs Reviewed - No data to display  EKG Readings: (Independently Interpreted)   Initial Reading: No STEMI. Rhythm: Normal Sinus Rhythm. Heart Rate: 62. Ectopy: No Ectopy. Conduction: Normal. ST Segments: Normal ST Segments. T Waves: Normal.                               Clinical Impression:   The primary encounter diagnosis was History of CVA with residual deficit. Diagnoses of Weakness and Transient cerebral ischemia, unspecified type were also pertinent to this visit.                           Jake Argueta MD  05/26/18 0459

## 2018-05-26 NOTE — DISCHARGE INSTRUCTIONS
Continue medications diet and activity as before.  If worse over the weekend return here.  Otherwise follow-up with Dr. Rashid on Monday

## 2018-05-26 NOTE — PROVIDER PROGRESS NOTES - EMERGENCY DEPT.
Encounter Date: 5/26/2018  Syndrome wife appraised of results of labs and CT.  He said his symptoms have improved somewhat.  He would prefer to go home and follow-up with Dr. Rashid on Monday.  ED Physician Progress Notes

## 2018-05-29 ENCOUNTER — TELEPHONE (OUTPATIENT)
Dept: FAMILY MEDICINE | Facility: CLINIC | Age: 53
End: 2018-05-29

## 2018-05-29 ENCOUNTER — OFFICE VISIT (OUTPATIENT)
Dept: NEUROLOGY | Facility: CLINIC | Age: 53
End: 2018-05-29
Payer: MEDICARE

## 2018-05-29 VITALS
RESPIRATION RATE: 14 BRPM | DIASTOLIC BLOOD PRESSURE: 90 MMHG | SYSTOLIC BLOOD PRESSURE: 172 MMHG | BODY MASS INDEX: 22.3 KG/M2 | WEIGHT: 173.75 LBS | HEART RATE: 88 BPM | HEIGHT: 74 IN

## 2018-05-29 DIAGNOSIS — G25.89 OTHER SPECIFIED EXTRAPYRAMIDAL AND MOVEMENT DISORDERS: ICD-10-CM

## 2018-05-29 DIAGNOSIS — M21.241 FLEXION DEFORMITY OF FINGER JOINT OF RIGHT HAND: ICD-10-CM

## 2018-05-29 DIAGNOSIS — R42 DIZZINESS: ICD-10-CM

## 2018-05-29 DIAGNOSIS — R06.02 SOB (SHORTNESS OF BREATH): ICD-10-CM

## 2018-05-29 DIAGNOSIS — R47.81 SLURRED SPEECH: Primary | ICD-10-CM

## 2018-05-29 DIAGNOSIS — R25.1 TREMOR: ICD-10-CM

## 2018-05-29 DIAGNOSIS — I10 ESSENTIAL HYPERTENSION: ICD-10-CM

## 2018-05-29 DIAGNOSIS — I69.30 LATE EFFECT OF CEREBROVASCULAR ACCIDENT (CVA): ICD-10-CM

## 2018-05-29 PROCEDURE — 99999 PR STA SHADOW: CPT | Mod: PBBFAC,,, | Performed by: PSYCHIATRY & NEUROLOGY

## 2018-05-29 PROCEDURE — 99214 OFFICE O/P EST MOD 30 MIN: CPT | Mod: S$PBB | Performed by: PSYCHIATRY & NEUROLOGY

## 2018-05-29 PROCEDURE — 99213 OFFICE O/P EST LOW 20 MIN: CPT | Mod: PBBFAC | Performed by: PSYCHIATRY & NEUROLOGY

## 2018-05-29 PROCEDURE — 99999 PR PBB SHADOW E&M-EST. PATIENT-LVL III: CPT | Mod: PBBFAC,,, | Performed by: PSYCHIATRY & NEUROLOGY

## 2018-05-29 NOTE — TELEPHONE ENCOUNTER
----- Message from Rea Amin sent at 2018  1:36 PM CDT -----  Contact: Self  Jett Ballard  MRN: [id number]  : 1965  PCP: Maynor Benitez  Home Phone      [phone]  Work Phone      Not on file.  Mobile          [phone]      MESSAGE:   Patient would like to be seen by Dr. Villegas. Please call to schedule if he accepts.    Jett  [phone]

## 2018-05-29 NOTE — PROGRESS NOTES
HPI:Jett Ballard is a 52 y.o. male  with PMHx of Right thalamic CVA (5/15) and was previously followed by an outside neurologist, Dr Lorenzo for tremors and memory loss since then.    This patient reported to the ER on 5/26/18 with tingling of the lips bilaterally and he felt difficulty with moving both arms and legs. There was no asymmetry. He felt his speech was mildly slurred. He continues to have symptoms of feeling fatigued and continued worsening of his baseline speech difficulty.   He was discharged home after symptoms improved  He had CT head in the ER- unremarkable    His light headedness with standing persist. He continues to feel like he may faint at times.   He does not see cardiology.  Shira CP but has some SOB with exertion  He does not smoke  His tremor is the same or worse  Patient states his BP runs higher chronically  He is swallowing well  Patient states EEG is pending with PCP.  Patient went to psychiatry via PCP- he is seeing a doc a [facility], saw Dr Quinones  Headaches are well controlled currently- he reports not needing fioricet    Review of Systems   Constitutional: Negative for fever.   HENT: Negative for nosebleeds.    Eyes: Negative for double vision.   Respiratory: Negative for hemoptysis.    Cardiovascular: Negative for leg swelling.   Gastrointestinal: Negative for blood in stool.   Genitourinary: Negative for hematuria.   Musculoskeletal: Negative for falls.   Skin: Negative for rash.   Neurological: Positive for tremors.   Psychiatric/Behavioral: The patient does not have insomnia.          I have reviewed all of this patient's past medical and surgical histories as well as family and social histories and active allergies and medications as documented in the electronic medical record.        Exam:  Gen Appearance, well developed/nourished in no apparent distress  CV: 2+ distal pulses with no edema or swelling  Neuro:  MS: Awake, alert, Sustains attention but mildly slurred speech since  CVA is noted. Recent/remote memory intact, Language is full to spontaneous speech/comprehension. Fund of Knowledge is full  CN: Optic discs are flat with normal vasculature, PERRL, Extraoccular movements and visual fields are full. Normal facial sensation and strength, Hearing symmetric, Tongue and Palate are midline and strong. Shoulder Shrug symmetric and strong.  Motor: Normal bulk, tone, no abnormal movements at rest, but tremor of both hands with activity and movement. 5/5 strength bilateral upper/lower extremities with 2+ reflexes and no clonus  Right fifth finger is contracted.   Sensory: symmetric to light touch, pain, temp, and vibration except decreased to temp on the left side chronically.  Romberg negative  Cerebellar: Finger-nose,Heal-shin, Rapid alternating movements intact  Gait: Normal stance-using cane due to mild left hemiparesis from CVA    Imaging:  3/2017 CT head: No acute intracranial process.   No significant change.  2/2017 MRI brain: 1.  No MRI evidence of an acute intracranial abnormality.  2.  Minimal stable early small vessel ischemic changes.  4/2016 MRI C spine: The study is significantly degraded by motion artifact with bulging of the C4-5 disc suspected resulting in effacement of ventral subarachnoid space and with mild/moderate flattening of the ventral cord and overall mild degree of canal stenosis.  Small left paracentral protrusion of the C6-7 disc noted with effacement of ventral subarachnoid space and question of mild flattening of the left ventral cord a low with mild bulging of the C5-6 disc suspected.  ______________________________________     4/2016 MRI Brain: N no evidence of acute infarction.  No appreciable change as compared to the MRI 06/10/2015 with several small nonspecific T2 hyperintensities evident in the white matter which may reflect small nonspecific areas of gliosis or perhaps very mild microvascular ischemic change including subtle somewhat linear appearing  T2 hyperintense intensity adjacent to head and body of caudate on the left.  ______________________________________     MRI brain 2015: No evidence of acute infarction.      Small nonspecific area of T2 hyperintensity adjacent to left head of caudate may reflect nonspecific area of gliosis with additional punctate focus nonspecific T2 hyperintensity in the left parietal subcortical white matter    2015 MRA brain: Punctate acute/subacute infarction involving the right thalamus without associated hemorrhage, mass-effect or midline shift.    Age-appropriate generalized cerebral volume loss with mild degree of chronic microvascular ischemic disease    2015 CTA head: Temporal evolution of the previously noted punctate right thalamic infarction with minimal hypodensity now visualized in this location.  Otherwise, unremarkable  CT of the head specifically without evidence for acute hemorrhage or abnormal parenchymal   enhancement.    Unremarkable CTA of the head specifically without evidence for focal stenosis or intracranial aneurysm.    5/2015 Carotid US: #1. Findings consistent with less than 50% stenosis in the Right carotid artery bulb.    #2. Findings consistent with less than 50% stenosis in the Left carotid artery bulb.    9/2015 EEG: IMPRESSION: This is a normal awake and drowsy EEG. It is   important to note   that patients with epilepsy may have a normal EEG. Clinical   correlation is   therefore necessary.    EMG 2016: This is a normal study of bilateral upper and lower   extremities without   significant evidence of neuropathy or radiculopathy.    1.2018 CT head: Unremarkable noncontrast CT head specifically without evidence for acute intracranial hemorrhage. Further evaluation as warrented clinically.    2.2017 MRI brain: 1.  No MRI evidence of an acute intracranial abnormality.  2.  Minimal stable early small vessel ischemic changes.    5/2018 CT head: No acute intracranial abnormality.    Final read    Xray  right finger noted (subluxation deformity)      5/2018 labs reviewed/ UDS negative      Assessment/Plan: Jett Ballard is a 52 y.o. male  with PMHx of Right thalamic CVA (5/15) and was previously followed by an outside neurologist, Dr Lorenzo for tremors and memory loss since then.  Had a spell of slurred speech and bilateral weakness/facial tingling this week with some speech not back to baseline  I recommend:   1. MRI brain needed per orders to rule out acute CVA. Check B12 level and RPR today  2. EEG is pending per PCP. The patient has had multiple spells of vague weakness and other neurological complaints since stroke with Negative MRI prior. Prior neurologist suggested somatization as a possible cause as patient had events due to life stressor. PCP sent patient to psychiatry, [facility] recently.   3. Patient stopped Cogentin due to orthostatic symptoms. He reports continued dizziness now in light of HTN and SOB with exertion at times- refer to cardiology  4. Continue Primidone for  Tremor which  looks more like benign essential tremor at this point- monitor.   5. He uses Fioricet PRN for headache - currently improved  6. Patient does not drive since CVA- should continue off driving.   7. Goal LDL for CVA prevention is less than 100 in this patient. Statin recently stopped by PCP  8. Also for CVA prevention: continue ASA started since first event and anti-HTN meds  9. He also has known mild cervical spinal stenosis by 2016 MRI C spine. EMG 4 extremity normal 2016 with Dr Lorenzo. He gets pain medications via PCP (who is currently monitoring his compliance/use of this given negative UDS)  10. Referred to orthopedist for chronic right fifth finger flexion- suggested he follow up with Dr Pineda for his subluxation deformity by xray ordered by that MD.   RTC as scheduled

## 2018-05-30 ENCOUNTER — HOSPITAL ENCOUNTER (OUTPATIENT)
Dept: RADIOLOGY | Facility: HOSPITAL | Age: 53
Discharge: HOME | End: 2018-05-30
Attending: PSYCHIATRY & NEUROLOGY
Payer: MEDICARE

## 2018-05-30 DIAGNOSIS — R42 DIZZINESS: ICD-10-CM

## 2018-05-30 DIAGNOSIS — R25.1 TREMOR: ICD-10-CM

## 2018-05-30 DIAGNOSIS — R47.81 SLURRED SPEECH: ICD-10-CM

## 2018-05-30 PROCEDURE — A9585 GADOBUTROL INJECTION: HCPCS | Performed by: PSYCHIATRY & NEUROLOGY

## 2018-05-30 PROCEDURE — 70553 MRI BRAIN STEM W/O & W/DYE: CPT | Mod: TC

## 2018-05-30 PROCEDURE — 70553 MRI BRAIN STEM W/O & W/DYE: CPT | Mod: 26,,, | Performed by: RADIOLOGY

## 2018-05-30 PROCEDURE — 25500020 PHARM REV CODE 255: Performed by: PSYCHIATRY & NEUROLOGY

## 2018-05-30 RX ORDER — GADOBUTROL 604.72 MG/ML
7 INJECTION INTRAVENOUS
Status: COMPLETED | OUTPATIENT
Start: 2018-05-30 | End: 2018-05-30

## 2018-05-30 RX ADMIN — GADOBUTROL 7 ML: 604.72 INJECTION INTRAVENOUS at 12:05

## 2018-06-05 ENCOUNTER — HOSPITAL ENCOUNTER (OUTPATIENT)
Facility: HOSPITAL | Age: 53
Discharge: HOME | End: 2018-06-06
Attending: SURGERY | Admitting: INTERNAL MEDICINE
Payer: MEDICARE

## 2018-06-05 ENCOUNTER — OFFICE VISIT (OUTPATIENT)
Dept: INTERNAL MEDICINE | Facility: CLINIC | Age: 53
End: 2018-06-05
Payer: MEDICARE

## 2018-06-05 VITALS
RESPIRATION RATE: 18 BRPM | BODY MASS INDEX: 21.76 KG/M2 | HEART RATE: 92 BPM | WEIGHT: 169.56 LBS | DIASTOLIC BLOOD PRESSURE: 110 MMHG | SYSTOLIC BLOOD PRESSURE: 160 MMHG | HEIGHT: 74 IN

## 2018-06-05 DIAGNOSIS — Z12.11 SCREEN FOR COLON CANCER: Primary | ICD-10-CM

## 2018-06-05 DIAGNOSIS — E78.2 MIXED HYPERLIPIDEMIA: ICD-10-CM

## 2018-06-05 DIAGNOSIS — I10 ESSENTIAL HYPERTENSION: ICD-10-CM

## 2018-06-05 DIAGNOSIS — R55 SYNCOPE: ICD-10-CM

## 2018-06-05 DIAGNOSIS — M54.9 CHRONIC BACK PAIN, UNSPECIFIED BACK LOCATION, UNSPECIFIED BACK PAIN LATERALITY: ICD-10-CM

## 2018-06-05 DIAGNOSIS — R25.1 TREMOR: ICD-10-CM

## 2018-06-05 DIAGNOSIS — F33.41 RECURRENT MAJOR DEPRESSIVE DISORDER, IN PARTIAL REMISSION: ICD-10-CM

## 2018-06-05 DIAGNOSIS — N40.0 BENIGN PROSTATIC HYPERPLASIA, UNSPECIFIED WHETHER LOWER URINARY TRACT SYMPTOMS PRESENT: ICD-10-CM

## 2018-06-05 DIAGNOSIS — I25.10 CARDIOVASCULAR DISEASE: ICD-10-CM

## 2018-06-05 DIAGNOSIS — R53.83 FATIGUE: ICD-10-CM

## 2018-06-05 DIAGNOSIS — G89.29 CHRONIC BACK PAIN, UNSPECIFIED BACK LOCATION, UNSPECIFIED BACK PAIN LATERALITY: ICD-10-CM

## 2018-06-05 DIAGNOSIS — G45.9 TIA (TRANSIENT ISCHEMIC ATTACK): ICD-10-CM

## 2018-06-05 DIAGNOSIS — M48.02 CERVICAL STENOSIS OF SPINAL CANAL: Chronic | ICD-10-CM

## 2018-06-05 LAB
ALBUMIN SERPL BCP-MCNC: 4.6 G/DL
ALP SERPL-CCNC: 78 U/L
ALT SERPL W/O P-5'-P-CCNC: 14 U/L
AMPHET+METHAMPHET UR QL: NEGATIVE
ANION GAP SERPL CALC-SCNC: 11 MMOL/L
APTT BLDCRRT: 26.7 SEC
AST SERPL-CCNC: 16 U/L
BARBITURATES UR QL SCN>200 NG/ML: NORMAL
BASOPHILS # BLD AUTO: 0.03 K/UL
BASOPHILS NFR BLD: 0.5 %
BENZODIAZ UR QL SCN>200 NG/ML: NORMAL
BILIRUB SERPL-MCNC: 0.5 MG/DL
BILIRUB UR QL STRIP: NEGATIVE
BNP SERPL-MCNC: <10 PG/ML
BUN SERPL-MCNC: 6 MG/DL
BZE UR QL SCN: NEGATIVE
CALCIUM SERPL-MCNC: 10.2 MG/DL
CANNABINOIDS UR QL SCN: NEGATIVE
CHLORIDE SERPL-SCNC: 102 MMOL/L
CK MB SERPL-MCNC: 1.8 NG/ML
CK MB SERPL-RTO: 1 %
CK SERPL-CCNC: 187 U/L
CK SERPL-CCNC: 187 U/L
CLARITY UR: CLEAR
CO2 SERPL-SCNC: 30 MMOL/L
COLOR UR: YELLOW
CREAT SERPL-MCNC: 0.8 MG/DL
CREAT UR-MCNC: 54.1 MG/DL
DIFFERENTIAL METHOD: ABNORMAL
EOSINOPHIL # BLD AUTO: 0.1 K/UL
EOSINOPHIL NFR BLD: 1.1 %
ERYTHROCYTE [DISTWIDTH] IN BLOOD BY AUTOMATED COUNT: 12.9 %
EST. GFR  (AFRICAN AMERICAN): >60 ML/MIN/1.73 M^2
EST. GFR  (NON AFRICAN AMERICAN): >60 ML/MIN/1.73 M^2
GLUCOSE SERPL-MCNC: 88 MG/DL
GLUCOSE UR QL STRIP: NEGATIVE
HCT VFR BLD AUTO: 49.4 %
HGB BLD-MCNC: 16.3 G/DL
HGB UR QL STRIP: NEGATIVE
INR PPP: 1
KETONES UR QL STRIP: NEGATIVE
LEUKOCYTE ESTERASE UR QL STRIP: NEGATIVE
LYMPHOCYTES # BLD AUTO: 0.7 K/UL
LYMPHOCYTES NFR BLD: 13.5 %
MAGNESIUM SERPL-MCNC: 2.6 MG/DL
MCH RBC QN AUTO: 30.1 PG
MCHC RBC AUTO-ENTMCNC: 33 G/DL
MCV RBC AUTO: 91 FL
METHADONE UR QL SCN>300 NG/ML: NEGATIVE
MONOCYTES # BLD AUTO: 0.4 K/UL
MONOCYTES NFR BLD: 6.6 %
NEUTROPHILS # BLD AUTO: 4.3 K/UL
NEUTROPHILS NFR BLD: 78.3 %
NITRITE UR QL STRIP: NEGATIVE
OPIATES UR QL SCN: NEGATIVE
PCP UR QL SCN>25 NG/ML: NEGATIVE
PH UR STRIP: 7 [PH] (ref 5–8)
PHOSPHATE SERPL-MCNC: 2.9 MG/DL
PLATELET # BLD AUTO: 245 K/UL
PMV BLD AUTO: 9.7 FL
POTASSIUM SERPL-SCNC: 3.6 MMOL/L
PROT SERPL-MCNC: 8.3 G/DL
PROT UR QL STRIP: NEGATIVE
PROTHROMBIN TIME: 10.4 SEC
RBC # BLD AUTO: 5.42 M/UL
SODIUM SERPL-SCNC: 143 MMOL/L
SP GR UR STRIP: 1.01 (ref 1–1.03)
TOXICOLOGY INFORMATION: NORMAL
TROPONIN I SERPL DL<=0.01 NG/ML-MCNC: <0.006 NG/ML
TSH SERPL DL<=0.005 MIU/L-ACNC: 0.68 UIU/ML
URN SPEC COLLECT METH UR: NORMAL
UROBILINOGEN UR STRIP-ACNC: NEGATIVE EU/DL
WBC # BLD AUTO: 5.47 K/UL

## 2018-06-05 PROCEDURE — 85730 THROMBOPLASTIN TIME PARTIAL: CPT

## 2018-06-05 PROCEDURE — 99204 OFFICE O/P NEW MOD 45 MIN: CPT | Mod: S$PBB | Performed by: NURSE PRACTITIONER

## 2018-06-05 PROCEDURE — 85610 PROTHROMBIN TIME: CPT

## 2018-06-05 PROCEDURE — 99999 PR PBB SHADOW E&M-EST. PATIENT-LVL IV: CPT | Mod: PBBFAC,,, | Performed by: NURSE PRACTITIONER

## 2018-06-05 PROCEDURE — 93010 ELECTROCARDIOGRAM REPORT: CPT | Mod: ,,, | Performed by: INTERNAL MEDICINE

## 2018-06-05 PROCEDURE — 99999 PR STA SHADOW: CPT | Mod: PBBFAC,,, | Performed by: NURSE PRACTITIONER

## 2018-06-05 PROCEDURE — 94760 N-INVAS EAR/PLS OXIMETRY 1: CPT

## 2018-06-05 PROCEDURE — 36415 COLL VENOUS BLD VENIPUNCTURE: CPT

## 2018-06-05 PROCEDURE — 83880 ASSAY OF NATRIURETIC PEPTIDE: CPT

## 2018-06-05 PROCEDURE — 25000003 PHARM REV CODE 250: Performed by: SURGERY

## 2018-06-05 PROCEDURE — 82550 ASSAY OF CK (CPK): CPT

## 2018-06-05 PROCEDURE — 82553 CREATINE MB FRACTION: CPT

## 2018-06-05 PROCEDURE — 80307 DRUG TEST PRSMV CHEM ANLYZR: CPT

## 2018-06-05 PROCEDURE — 83735 ASSAY OF MAGNESIUM: CPT

## 2018-06-05 PROCEDURE — 84484 ASSAY OF TROPONIN QUANT: CPT

## 2018-06-05 PROCEDURE — 84100 ASSAY OF PHOSPHORUS: CPT

## 2018-06-05 PROCEDURE — 85025 COMPLETE CBC W/AUTO DIFF WBC: CPT

## 2018-06-05 PROCEDURE — 99285 EMERGENCY DEPT VISIT HI MDM: CPT | Mod: 25,27

## 2018-06-05 PROCEDURE — G0378 HOSPITAL OBSERVATION PER HR: HCPCS

## 2018-06-05 PROCEDURE — 81003 URINALYSIS AUTO W/O SCOPE: CPT

## 2018-06-05 PROCEDURE — 99214 OFFICE O/P EST MOD 30 MIN: CPT | Mod: PBBFAC,25 | Performed by: NURSE PRACTITIONER

## 2018-06-05 PROCEDURE — 80053 COMPREHEN METABOLIC PANEL: CPT

## 2018-06-05 PROCEDURE — 93005 ELECTROCARDIOGRAM TRACING: CPT

## 2018-06-05 PROCEDURE — 84443 ASSAY THYROID STIM HORMONE: CPT

## 2018-06-05 PROCEDURE — 27000221 HC OXYGEN, UP TO 24 HOURS

## 2018-06-05 RX ORDER — HYDROCODONE BITARTRATE AND ACETAMINOPHEN 5; 325 MG/1; MG/1
1 TABLET ORAL EVERY 4 HOURS PRN
Status: DISCONTINUED | OUTPATIENT
Start: 2018-06-05 | End: 2018-06-06 | Stop reason: HOSPADM

## 2018-06-05 RX ORDER — ONDANSETRON 2 MG/ML
4 INJECTION INTRAMUSCULAR; INTRAVENOUS EVERY 8 HOURS PRN
Status: DISCONTINUED | OUTPATIENT
Start: 2018-06-05 | End: 2018-06-06 | Stop reason: HOSPADM

## 2018-06-05 RX ORDER — AMLODIPINE AND BENAZEPRIL HYDROCHLORIDE 5; 10 MG/1; MG/1
1 CAPSULE ORAL 2 TIMES DAILY
COMMUNITY
Start: 2018-06-04 | End: 2019-11-06

## 2018-06-05 RX ORDER — ACETAMINOPHEN 325 MG/1
650 TABLET ORAL EVERY 8 HOURS PRN
Status: DISCONTINUED | OUTPATIENT
Start: 2018-06-05 | End: 2018-06-06 | Stop reason: HOSPADM

## 2018-06-05 RX ORDER — PANTOPRAZOLE SODIUM 40 MG/1
40 TABLET, DELAYED RELEASE ORAL DAILY
Status: DISCONTINUED | OUTPATIENT
Start: 2018-06-06 | End: 2018-06-06 | Stop reason: HOSPADM

## 2018-06-05 RX ADMIN — HYDROCODONE BITARTRATE AND ACETAMINOPHEN 1 TABLET: 5; 325 TABLET ORAL at 10:06

## 2018-06-05 NOTE — PROGRESS NOTES
Subjective:       Patient ID: Jett Ballard is a 52 y.o. male.    Chief Complaint: Establish Care    HPI: Pt presents to clinic today new to me and clinic. He reports that he was seeing Dr Basilio but is aggrivated with wait times so he is now here to establish. He reports that he is here to get established.     He saw Dr Dickson yesterday. Changed his benazapril to lotrel yesterday. 1st dose was this am. He will be seeing Dr Armijo 6/11. With blood work ordered    Also sees Dr Rashid for hx of CVA. Has dysphagia and left sided upper and lower ext weakness.       Past Medical History:   Diagnosis Date    Hypercholesteremia     Hypertension     Left sided numbness s/p CVA     Leg weakness s/p CVA     Other and unspecified hyperlipidemia     Positional lightheadedness     Stroke 2015     Tremor     Weakness        Past Surgical History:   Procedure Laterality Date    BACK SURGERY         Family History   Problem Relation Age of Onset    Heart disease Father     Cancer Father        Social History     Social History    Marital status: Single     Spouse name: N/A    Number of children: N/A    Years of education: 11     Social History Main Topics    Smoking status: Never Smoker    Smokeless tobacco: Current User     Types: Snuff      Comment: 33 yr history of dip    Alcohol use No    Drug use: No    Sexual activity: Yes     Partners: Female     Birth control/ protection: None   Denies smoking  Other Topics Concern    None     Social History Narrative    None       Current Outpatient Prescriptions   Medication Sig Dispense Refill    amlodipine-benazepril 5-10 mg (LOTREL) 5-10 mg per capsule       aspirin 81 MG Chew Take 162 mg by mouth once daily.       butalbital-acetaminophen-caffeine -40 mg (FIORICET, ESGIC) -40 mg per tablet Take 1 tablet by mouth 2 (two) times daily as needed for Headaches. 60 tablet 3    diazePAM (VALIUM) 10 MG Tab TAKE ONE TABLET BY MOUTH EVERY 8 HOURS AS  NEEDED FOR ANXIETY 90 tablet 3    finasteride (PROSCAR) 5 mg tablet TAKE ONE TABLET BY MOUTH ONCE DAILY 90 tablet 3    oxyCODONE-acetaminophen (PERCOCET)  mg per tablet Take 1 tablet by mouth every 8 (eight) hours as needed for Pain. 90 tablet 0    pantoprazole (PROTONIX) 40 MG tablet Take 1 tablet (40 mg total) by mouth once daily. 90 tablet 3    pentoxifylline (TRENTAL) 400 mg TbSR Take 1 tablet (400 mg total) by mouth 2 (two) times daily with meals. 60 tablet 5    primidone (MYSOLINE) 50 MG Tab Take 1/2 BID for 6 weeks, then one BID (Patient taking differently: Take 50 mg by mouth 2 (two) times daily. ) 60 tablet 5    tamsulosin (FLOMAX) 0.4 mg Cp24 TAKE ONE CAPSULE BY MOUTH ONCE DAILY 90 capsule 3     No current facility-administered medications for this visit.        Review of patient's allergies indicates:   Allergen Reactions    Adhesive Rash    Latex, natural rubber Rash     Never had colonoscopy      Review of Systems   Constitutional: Negative for chills and fever.   HENT: Negative for congestion, postnasal drip and sore throat.    Eyes: Negative for photophobia.   Respiratory: Negative for chest tightness and shortness of breath.    Cardiovascular: Negative for chest pain.   Gastrointestinal: Negative for abdominal distention, abdominal pain, blood in stool and vomiting.   Genitourinary: Negative for dysuria, flank pain and hematuria.   Musculoskeletal: Positive for back pain and neck pain.        Chronic, receives opiate and benzo from previous PCP   Skin: Negative for pallor.   Neurological: Positive for tremors and headaches. Negative for dizziness, seizures, syncope, facial asymmetry, speech difficulty and numbness.        At times slurred speech, rambles   Hematological: Does not bruise/bleed easily.   Psychiatric/Behavioral: Negative for agitation and suicidal ideas. The patient is not nervous/anxious.        Objective:      Physical Exam   Constitutional: He is oriented to person,  place, and time. He appears well-developed and well-nourished.   HENT:   Head: Normocephalic and atraumatic.   Right Ear: External ear normal.   Left Ear: External ear normal.   Nose: Nose normal.   Mouth/Throat: Oropharynx is clear and moist. No oropharyngeal exudate.   Eyes: EOM are normal. Pupils are equal, round, and reactive to light.   Neck: Normal range of motion. Neck supple. No thyromegaly present.   Cardiovascular: Normal rate, regular rhythm, normal heart sounds and intact distal pulses.    No murmur heard.  Recheck bp 160/110   Pulmonary/Chest: Effort normal and breath sounds normal. No respiratory distress.   Abdominal: Soft. Bowel sounds are normal. He exhibits no distension and no mass. There is no tenderness. There is no guarding.   Musculoskeletal: Normal range of motion. He exhibits no edema.   Lymphadenopathy:     He has no cervical adenopathy.   Neurological: He is alert and oriented to person, place, and time. A cranial nerve deficit (loss of hearing left, slightly weaker left upper ext, walks with cane) is present.   Rambles, jumps from topic to topic.    Skin: Skin is warm and dry. Capillary refill takes less than 2 seconds. No erythema.   Psychiatric:   Flat affect   Nursing note and vitals reviewed.      Assessment:       1. Screen for colon cancer    2. Benign prostatic hyperplasia, unspecified whether lower urinary tract symptoms present    3. Chronic back pain, unspecified back location, unspecified back pain laterality    4. Cervical stenosis of spinal canal    5. Mixed hyperlipidemia    6. Tremor    7. Recurrent major depressive disorder, in partial remission    8. Essential hypertension        Plan:     Problem List Items Addressed This Visit     Cervical stenosis of spinal canal (Chronic)- refer to pain management. I have explained that I can not rx controlled substances for chronic pain as an NP    Mixed hyperlipidemia- he reports Leslie took him off hte statin. I believe with his  stroke hx and HTN he should be on one. Will follow up with Dr SOLIZ NEXT WEEK WITH LABS,    Tremor- CONT PRIMIDONE    Recurrent major depressive disorder, in partial remission- no longer following with psych    Essential hypertension- just had mediucation adjusted yesterday with Dr arellano. Will f/u with Dr Soliz. Consider adding statin    Hx CVA, cont asa and trental    Other Visit Diagnoses     Screen for colon cancer    -  Primary    Relevant Orders    Case request GI: COLONOSCOPY (Completed)    Benign prostatic hyperplasia, unspecified whether lower urinary tract symptoms present        Relevant Orders    Ambulatory Referral to Urology    Chronic back pain, unspecified back location, unspecified back pain laterality        Relevant Orders    Ambulatory Referral to Pain Clinic        psa 4/2018 0.61  tsh 1.58  Cholesterol in 4/2018 total 175, trig 86, hdl 63, ldl 94

## 2018-06-05 NOTE — ED NOTES
Pt given urine speciman cup, wen soap towelettewipe, and instructions for [facility]; understanding verbalized

## 2018-06-05 NOTE — ED TRIAGE NOTES
"52 y.o. male presents to ER ED 06/ED 06   Chief Complaint   Patient presents with    Loss of Consciousness   Pt reports throbbing headache followed by syncopal episode, pt called wife and told her to come get him off the floor. EMS reports pt going "in and out of consciousness." Pt is AAOX4. No acute distress noted.    "

## 2018-06-06 VITALS
BODY MASS INDEX: 21.45 KG/M2 | SYSTOLIC BLOOD PRESSURE: 153 MMHG | HEART RATE: 72 BPM | DIASTOLIC BLOOD PRESSURE: 89 MMHG | TEMPERATURE: 97 F | RESPIRATION RATE: 18 BRPM | HEIGHT: 74 IN | WEIGHT: 167.13 LBS | OXYGEN SATURATION: 97 %

## 2018-06-06 PROBLEM — E87.6 HYPOKALEMIA: Status: ACTIVE | Noted: 2018-06-06

## 2018-06-06 PROBLEM — R55 SYNCOPE: Status: ACTIVE | Noted: 2018-06-06

## 2018-06-06 LAB
ALBUMIN SERPL BCP-MCNC: 3.8 G/DL
ALP SERPL-CCNC: 64 U/L
ALT SERPL W/O P-5'-P-CCNC: 12 U/L
ANION GAP SERPL CALC-SCNC: 10 MMOL/L
AST SERPL-CCNC: 14 U/L
BASOPHILS # BLD AUTO: 0.02 K/UL
BASOPHILS NFR BLD: 0.4 %
BILIRUB SERPL-MCNC: 0.6 MG/DL
BUN SERPL-MCNC: 6 MG/DL
CALCIUM SERPL-MCNC: 9.3 MG/DL
CHLORIDE SERPL-SCNC: 104 MMOL/L
CO2 SERPL-SCNC: 26 MMOL/L
CREAT SERPL-MCNC: 0.7 MG/DL
DIFFERENTIAL METHOD: NORMAL
EOSINOPHIL # BLD AUTO: 0.1 K/UL
EOSINOPHIL NFR BLD: 2.4 %
ERYTHROCYTE [DISTWIDTH] IN BLOOD BY AUTOMATED COUNT: 12.9 %
EST. GFR  (AFRICAN AMERICAN): >60 ML/MIN/1.73 M^2
EST. GFR  (NON AFRICAN AMERICAN): >60 ML/MIN/1.73 M^2
GLUCOSE SERPL-MCNC: 96 MG/DL
HCT VFR BLD AUTO: 44.2 %
HGB BLD-MCNC: 14.8 G/DL
LYMPHOCYTES # BLD AUTO: 1.2 K/UL
LYMPHOCYTES NFR BLD: 22.7 %
MCH RBC QN AUTO: 30.4 PG
MCHC RBC AUTO-ENTMCNC: 33.5 G/DL
MCV RBC AUTO: 91 FL
MONOCYTES # BLD AUTO: 0.4 K/UL
MONOCYTES NFR BLD: 7.7 %
NEUTROPHILS # BLD AUTO: 3.6 K/UL
NEUTROPHILS NFR BLD: 66.8 %
PLATELET # BLD AUTO: 240 K/UL
PMV BLD AUTO: 9.7 FL
POTASSIUM SERPL-SCNC: 3.1 MMOL/L
PROT SERPL-MCNC: 6.8 G/DL
RBC # BLD AUTO: 4.87 M/UL
SODIUM SERPL-SCNC: 140 MMOL/L
TROPONIN I SERPL DL<=0.01 NG/ML-MCNC: <0.006 NG/ML
TROPONIN I SERPL DL<=0.01 NG/ML-MCNC: <0.006 NG/ML
WBC # BLD AUTO: 5.43 K/UL

## 2018-06-06 PROCEDURE — 93005 ELECTROCARDIOGRAM TRACING: CPT

## 2018-06-06 PROCEDURE — 25000003 PHARM REV CODE 250: Performed by: SURGERY

## 2018-06-06 PROCEDURE — 94760 N-INVAS EAR/PLS OXIMETRY 1: CPT

## 2018-06-06 PROCEDURE — 36415 COLL VENOUS BLD VENIPUNCTURE: CPT

## 2018-06-06 PROCEDURE — 25000003 PHARM REV CODE 250: Performed by: INTERNAL MEDICINE

## 2018-06-06 PROCEDURE — G0378 HOSPITAL OBSERVATION PER HR: HCPCS

## 2018-06-06 PROCEDURE — 93010 ELECTROCARDIOGRAM REPORT: CPT | Mod: ,,, | Performed by: INTERNAL MEDICINE

## 2018-06-06 PROCEDURE — 85025 COMPLETE CBC W/AUTO DIFF WBC: CPT

## 2018-06-06 PROCEDURE — 25000003 PHARM REV CODE 250: Performed by: NURSE PRACTITIONER

## 2018-06-06 PROCEDURE — 80053 COMPREHEN METABOLIC PANEL: CPT

## 2018-06-06 PROCEDURE — 99220 PR INITIAL OBSERVATION CARE,LEVL III: CPT | Mod: ,,, | Performed by: FAMILY MEDICINE

## 2018-06-06 PROCEDURE — 84484 ASSAY OF TROPONIN QUANT: CPT | Mod: 91

## 2018-06-06 RX ORDER — BENAZEPRIL HYDROCHLORIDE 10 MG/1
10 TABLET ORAL DAILY
Status: DISCONTINUED | OUTPATIENT
Start: 2018-06-06 | End: 2018-06-06 | Stop reason: HOSPADM

## 2018-06-06 RX ORDER — POTASSIUM CHLORIDE 20 MEQ/1
20 TABLET, EXTENDED RELEASE ORAL ONCE
Status: COMPLETED | OUTPATIENT
Start: 2018-06-06 | End: 2018-06-06

## 2018-06-06 RX ORDER — TAMSULOSIN HYDROCHLORIDE 0.4 MG/1
1 CAPSULE ORAL DAILY
Status: DISCONTINUED | OUTPATIENT
Start: 2018-06-06 | End: 2018-06-06 | Stop reason: HOSPADM

## 2018-06-06 RX ORDER — PENTOXIFYLLINE 400 MG/1
400 TABLET, EXTENDED RELEASE ORAL 2 TIMES DAILY WITH MEALS
Status: DISCONTINUED | OUTPATIENT
Start: 2018-06-06 | End: 2018-06-06 | Stop reason: HOSPADM

## 2018-06-06 RX ORDER — PRIMIDONE 50 MG/1
50 TABLET ORAL 2 TIMES DAILY
Status: DISCONTINUED | OUTPATIENT
Start: 2018-06-06 | End: 2018-06-06 | Stop reason: HOSPADM

## 2018-06-06 RX ORDER — AMLODIPINE AND BENAZEPRIL HYDROCHLORIDE 5; 10 MG/1; MG/1
1 CAPSULE ORAL DAILY
Status: DISCONTINUED | OUTPATIENT
Start: 2018-06-06 | End: 2018-06-06

## 2018-06-06 RX ORDER — FINASTERIDE 5 MG/1
5 TABLET, FILM COATED ORAL DAILY
Status: DISCONTINUED | OUTPATIENT
Start: 2018-06-06 | End: 2018-06-06 | Stop reason: HOSPADM

## 2018-06-06 RX ORDER — NAPROXEN SODIUM 220 MG/1
162 TABLET, FILM COATED ORAL DAILY
Status: DISCONTINUED | OUTPATIENT
Start: 2018-06-06 | End: 2018-06-06 | Stop reason: HOSPADM

## 2018-06-06 RX ORDER — POTASSIUM CHLORIDE 20 MEQ/1
40 TABLET, EXTENDED RELEASE ORAL ONCE
Status: COMPLETED | OUTPATIENT
Start: 2018-06-06 | End: 2018-06-06

## 2018-06-06 RX ORDER — AMLODIPINE BESYLATE 5 MG/1
5 TABLET ORAL DAILY
Status: DISCONTINUED | OUTPATIENT
Start: 2018-06-06 | End: 2018-06-06 | Stop reason: HOSPADM

## 2018-06-06 RX ADMIN — POTASSIUM CHLORIDE 40 MEQ: 1500 TABLET, EXTENDED RELEASE ORAL at 08:06

## 2018-06-06 RX ADMIN — ASPIRIN 81 MG 162 MG: 81 TABLET ORAL at 11:06

## 2018-06-06 RX ADMIN — BENAZEPRIL HYDROCHLORIDE 10 MG: 10 TABLET, FILM COATED ORAL at 11:06

## 2018-06-06 RX ADMIN — PRIMIDONE 50 MG: 50 TABLET ORAL at 11:06

## 2018-06-06 RX ADMIN — FINASTERIDE 5 MG: 5 TABLET, FILM COATED ORAL at 11:06

## 2018-06-06 RX ADMIN — HYDROCODONE BITARTRATE AND ACETAMINOPHEN 1 TABLET: 5; 325 TABLET ORAL at 08:06

## 2018-06-06 RX ADMIN — AMLODIPINE BESYLATE 5 MG: 5 TABLET ORAL at 11:06

## 2018-06-06 RX ADMIN — POTASSIUM CHLORIDE 20 MEQ: 1500 TABLET, EXTENDED RELEASE ORAL at 11:06

## 2018-06-06 RX ADMIN — TAMSULOSIN HYDROCHLORIDE 0.4 MG: 0.4 CAPSULE ORAL at 11:06

## 2018-06-06 RX ADMIN — PANTOPRAZOLE SODIUM 40 MG: 40 TABLET, DELAYED RELEASE ORAL at 08:06

## 2018-06-06 NOTE — PLAN OF CARE
06/06/18 0730   Discharge Assessment   Assessment Type Discharge Planning Assessment   Confirmed/corrected address and phone number on facesheet? Yes   Assessment information obtained from? Patient   Expected Length of Stay (days) 1   Communicated expected length of stay with patient/caregiver yes   Prior to hospitilization cognitive status: Alert/Oriented   Prior to hospitalization functional status: Assistive Equipment   Current cognitive status: Alert/Oriented   Current Functional Status: Assistive Equipment   Facility Arrived From: home   Lives With significant other;parent(s)   Able to Return to Prior Arrangements yes   Is patient able to care for self after discharge? Yes   Who are your caregiver(s) and their phone number(s)? Acacia Benedict-[phone]   Patient's perception of discharge disposition home or selfcare   Readmission Within The Last 30 Days no previous admission in last 30 days   Patient currently being followed by outpatient case management? No   Patient currently receives any other outside agency services? No   Equipment Currently Used at Home cane, straight   Do you have any problems affording any of your prescribed medications? No   Is the patient taking medications as prescribed? yes   Does the patient have transportation home? Yes   Transportation Available family or friend will provide   Does the patient receive services at the Coumadin Clinic? No   Discharge Plan A Home with family   Discharge Plan B Home with family   Patient/Family In Agreement With Plan yes     Patient lives with his mother and his figinae. He plans to return there at discharge. He has no post acute care needs at this time.

## 2018-06-06 NOTE — ED PROVIDER NOTES
[facility]                                                 Chief Complaint  52 y.o. male with Loss of Consciousness    History of Present Illness  Jett Ballard presents to the emergency room with syncope episode today  Patient had an episode of weakness at home, EMS thought possible facial droop  Patient had no facial droop on arrival here, reports possible TIA symptoms PTA  Patient arrives more weak no obvious slurred speech, no obvious deficit noted   The patient's history is very significant for CVA, has had a late effects of his CVA    The history is provided by the patient   device was not used during this ER visit      Past Medical History   -- Hypertension      -- Left sided numbness      -- Leg weakness      -- Other and unspecified hyperlipidemia      -- Positional lightheadedness      -- Stroke      -- Tremor      -- Weakness          Past Surgical History   -- Back surgery             Allergies   -- Adhesive      -- Latex, Natural Rubber      Review of Systems and Physical Exam      Review of Systems - provided by the patient  -- Constitution - no fever, denies fatigue, no weakness, no chills  -- Eyes - no tearing or redness, no visual disturbance  -- Ear, Nose - no tinnitus or earache, no nasal congestion or discharge  -- Mouth,Throat - no sore throat, no toothache, normal voice, normal swallowing  -- Respiratory - denies cough and congestion, no shortness of breath, no GIRALDO  -- Cardiovascular - denies chest pain, no palpitations, denies claudication  -- Gastrointestinal - denies abdominal pain, nausea, vomiting, or diarrhea  -- Genitourinary - no dysuria, no denies flank pain, no hematuria or frequency   -- Musculoskeletal - denies back pain, negative for myalgias and arthralgias   -- Neurological - episode of syncope, possible slurred speech and facial droop  -- Skin - denies pallor, rash, or changes in skin. no hives or welts noted    BP (!) 161/112   Pulse  85   Temp 97.2 °F (36.2 °C) (Oral)   Resp (!) 24     Physical Exam  -- Nursing note and vitals reviewed  -- Constitutional: Appears well-developed and well-nourished  -- Head: Atraumatic. Normocephalic. No obvious abnormality  -- Eyes: Pupils are equal and reactive to light. Normal conjunctiva and lids  -- Cardiac: Normal rate, regular rhythm and normal heart sounds  -- Pulmonary: Normal respiratory effort, breath sounds clear to auscultation  -- Abdominal: Soft, no tenderness. Normal bowel sounds. Normal liver edge  -- Musculoskeletal: Normal range of motion, no effusions. Joints stable   -- Neurological: No focal deficits. Showed good interaction with staff  -- Vascular: Posterior tibial, dorsalis pedis and radial pulses 2+ bilaterally      Emergency Room Course      Labs     K 3.6      CO2 30 (H)   BUN 6   CREATININE 0.8   GLU 88   ALKPHOS 78   AST 16   ALT 14   BILITOT 0.5   ALBUMIN 4.6   PROT 8.3   WBC 5.47   HGB 16.3   HCT 49.4            CPKMB 1.8   TROPONINI <0.006   INR 1.0   BNP <10   LACTATE 0.5   MG 2.6   TSH 0.684     Urinalysis  -- Urinalysis performed during this ER visit showed no signs of infection      EKG  -- The EKG findings today were without concerning findings from baseline    Radiology  -- The CT of the head performed in the ER today was negative for acute pathology  -- Chest x-ray showed no infiltrate and showed no acute pathology    Medical Decision Making  -- Diagnosis management comments: 52 y.o. male with TIA symptoms today  -- EMS stated the patient possibly has some facial droop, resolved on arrival  -- patient has seen his neurologist on the 29th, had previous episodes also  -- patient will be watched overnight with an MRI of the brain in the morning    Diagnosis  -- Altered mental status    Disposition and Plan  -- Disposition: observation  -- Condition: stable  -- Telemetry monitoring  -- Morning labs  -- SCD hoses  -- Home medications  -- Nausea  medication when necessary  -- Pain medication when necessary  -- Hep-Lock IV  -- Routine monitoring  -- Bed rest until otherwise stated  -- Low salt diet  -- Protonix for GERD prophylaxis  -- EKG in the morning  -- Aspirin daily  -- Cardiology consult  -- Serial cardiac enzymes  -- Neurology consult  -- Neuro checks  -- MRI Brain     This note is dictated on Dragon Natural Speaking word recognition program.  There are word recognition mistakes that are occasionally missed on review.            Philippe Luis MD  06/05/18 2014

## 2018-06-06 NOTE — ASSESSMENT & PLAN NOTE
For the record no one witnessed a syncopal episode. This is per patient.   He is overmedicated with benzos and opioids in my opinion, which is complicating his neurological symptoms and clouding the clinical picture here. He needs to work on tapering his bzd and opioids with his pcp.   Follow up with Neurology as OP  Cards as OP

## 2018-06-06 NOTE — PLAN OF CARE
Problem: Patient Care Overview  Goal: Plan of Care Review  Outcome: Outcome(s) achieved Date Met: 06/06/18  Patient stable. Denies feeling dizzy or lightheaded. Remains free from falls. Slurred speech noted. Cardiac enzymes WNL. CIS & Neuro on case. Patient to be discharged to home. Will follow up with Neuro, CIS, & PCP outpatient.

## 2018-06-06 NOTE — HPI
"53 YO WM brought to ER per EMS yesterday PM for episode of syncope; pt states he went home from clinic appnt and "caught a real bad headache" then he does not remember anything after that. He reports his "fiance' found him unconscious". EMS brought pt to ER and reported  possible facial droop. ER MD reported Patient had no facial droop on arrival here,   Prior to this even Pt presented to IM Clinic yesterday to establish care.  He reported that he was seeing Dr Basilio but is aggrivated with wait times. BP was elevated 160/110 in clinic.  He saw Dr Dickson 2 days ago; reportedly changed his benazapril to lotrel. 1st dose was yesterday am. Planned to follow up with  Dr Armijo 6/11. With blood work ordered.     Pt has history of CVA, follows with  Dr Rashid. Has dysphagia and left sided upper and lower ext weakness  Pt also has hx of Cervical stenosis of spinal canal (Chronic)- and was referred  to pain management. Prior chronic pain Rx per Dr. Basilio.   This am pt NAD. Pt does have slow and deliberate speech. Not sure if this is his baseline.   TNI negative x 2. Urine ok; K+ low - replaced this am. CT of head WNL; no acute findings.   "

## 2018-06-06 NOTE — SUBJECTIVE & OBJECTIVE
Past Medical History:   Diagnosis Date    Hypercholesteremia     Hypertension     Left sided numbness     Leg weakness     Other and unspecified hyperlipidemia     Positional lightheadedness     Stroke     Tremor     Weakness        Past Surgical History:   Procedure Laterality Date    BACK SURGERY         Review of patient's allergies indicates:   Allergen Reactions    Adhesive Rash    Latex, natural rubber Rash       No current facility-administered medications on file prior to encounter.      Current Outpatient Prescriptions on File Prior to Encounter   Medication Sig    amlodipine-benazepril 5-10 mg (LOTREL) 5-10 mg per capsule     aspirin 81 MG Chew Take 162 mg by mouth once daily.     butalbital-acetaminophen-caffeine -40 mg (FIORICET, ESGIC) -40 mg per tablet Take 1 tablet by mouth 2 (two) times daily as needed for Headaches.    diazePAM (VALIUM) 10 MG Tab TAKE ONE TABLET BY MOUTH EVERY 8 HOURS AS NEEDED FOR ANXIETY    finasteride (PROSCAR) 5 mg tablet TAKE ONE TABLET BY MOUTH ONCE DAILY    oxyCODONE-acetaminophen (PERCOCET)  mg per tablet Take 1 tablet by mouth every 8 (eight) hours as needed for Pain.    pantoprazole (PROTONIX) 40 MG tablet Take 1 tablet (40 mg total) by mouth once daily.    pentoxifylline (TRENTAL) 400 mg TbSR Take 1 tablet (400 mg total) by mouth 2 (two) times daily with meals.    primidone (MYSOLINE) 50 MG Tab Take 1/2 BID for 6 weeks, then one BID (Patient taking differently: Take 50 mg by mouth 2 (two) times daily. )    tamsulosin (FLOMAX) 0.4 mg Cp24 TAKE ONE CAPSULE BY MOUTH ONCE DAILY     Family History     Problem Relation (Age of Onset)    Cancer Father    Heart disease Father        Social History Main Topics    Smoking status: Never Smoker    Smokeless tobacco: Current User     Types: Snuff      Comment: 33 yr history of dip    Alcohol use No    Drug use: No    Sexual activity: Yes     Partners: Female     Birth control/ protection:  None     Review of Systems   Constitutional: Negative.    HENT: Positive for dental problem. Negative for congestion, ear pain, sinus pressure, sneezing and sore throat.    Eyes: Negative.    Respiratory: Negative for cough, chest tightness, shortness of breath and wheezing.    Cardiovascular: Negative.  Negative for chest pain.   Gastrointestinal: Negative for abdominal pain, blood in stool, constipation, diarrhea, nausea and vomiting.   Genitourinary: Negative for difficulty urinating, dysuria and flank pain.   Musculoskeletal: Negative.  Negative for joint swelling.   Skin: Negative.    Neurological: Positive for tremors (chronic ) and headaches. Negative for dizziness and weakness. Speech difficulty: slurred speech noted but  may be baseline    Hematological: Negative.    Psychiatric/Behavioral: Negative.  Negative for behavioral problems and confusion.     Objective:     Vital Signs (Most Recent):  Temp: 96.9 °F (36.1 °C) (06/06/18 0411)  Pulse: 71 (06/06/18 0737)  Resp: 18 (06/06/18 0411)  BP: (!) 151/105 (06/06/18 0737)  SpO2: (!) 94 % (06/06/18 0411) Vital Signs (24h Range):  Temp:  [96.9 °F (36.1 °C)-97.9 °F (36.6 °C)] 96.9 °F (36.1 °C)  Pulse:  [56-92] 71  Resp:  [12-24] 18  SpO2:  [94 %-100 %] 94 %  BP: (142-186)/() 151/105     Weight: 75.8 kg (167 lb 1.6 oz)  Body mass index is 21.45 kg/m².    Physical Exam   Constitutional: He is oriented to person, place, and time. He appears well-developed and well-nourished.   HENT:   Head: Normocephalic and atraumatic.   Right Ear: External ear normal.   Left Ear: External ear normal.   Nose: Nose normal.   Mouth/Throat: Oropharynx is clear and moist.   Eyes: Conjunctivae and EOM are normal. Pupils are equal, round, and reactive to light. Right eye exhibits no discharge. Left eye exhibits no discharge. No scleral icterus.   Neck: Normal range of motion. Neck supple. No JVD present. No tracheal deviation present. No thyromegaly present.   Cardiovascular: Normal  rate, regular rhythm, normal heart sounds and intact distal pulses.    No murmur heard.  Pulmonary/Chest: Effort normal and breath sounds normal. No respiratory distress. He has no wheezes. He has no rales. He exhibits no tenderness.   Abdominal: Soft. Bowel sounds are normal. He exhibits no distension and no mass. There is no tenderness. There is no rebound and no guarding.   Musculoskeletal: Normal range of motion.   Lymphadenopathy:     He has no cervical adenopathy.   Neurological: He is alert and oriented to person, place, and time. He has normal reflexes. He displays normal reflexes. No cranial nerve deficit. He exhibits normal muscle tone. Coordination normal.   Slurred speech    Skin: Skin is warm and dry.   Psychiatric: He has a normal mood and affect. His behavior is normal. Judgment and thought content normal.         CRANIAL NERVES     CN III, IV, VI   Pupils are equal, round, and reactive to light.  Extraocular motions are normal.        Significant Labs:   ABGs: No results for input(s): PH, PCO2, HCO3, POCSATURATED, BE, TOTALHB, COHB, METHB, O2HB, POCFIO2 in the last 48 hours.  Blood Culture: No results for input(s): LABBLOO in the last 48 hours.  CBC:   Recent Labs  Lab 06/05/18 1823 06/06/18  0230   WBC 5.47 5.43   HGB 16.3 14.8   HCT 49.4 44.2    240     CMP:   Recent Labs  Lab 06/05/18 1823 06/06/18  0230    140   K 3.6 3.1*    104   CO2 30* 26   GLU 88 96   BUN 6 6   CREATININE 0.8 0.7   CALCIUM 10.2 9.3   PROT 8.3 6.8   ALBUMIN 4.6 3.8   BILITOT 0.5 0.6   ALKPHOS 78 64   AST 16 14   ALT 14 12   ANIONGAP 11 10   EGFRNONAA >60 >60     Cardiac Markers:   Recent Labs  Lab 06/05/18 1823   BNP <10     Lactic Acid: No results for input(s): LACTATE in the last 48 hours.  Lipid Panel: No results for input(s): CHOL, HDL, LDLCALC, TRIG, CHOLHDL in the last 48 hours.  Magnesium:   Recent Labs  Lab 06/05/18 1823   MG 2.6     Troponin:   Recent Labs  Lab 06/05/18 1823 06/06/18  0230    TROPONINI <0.006 <0.006     TSH:   Recent Labs  Lab 06/05/18  1823   TSH 0.684     Urine Culture: No results for input(s): LABURIN in the last 48 hours.  Urine Studies:   Recent Labs  Lab 06/05/18  1937   COLORU Yellow   APPEARANCEUA Clear   PHUR 7.0   SPECGRAV 1.010   PROTEINUA Negative   GLUCUA Negative   KETONESU Negative   BILIRUBINUA Negative   OCCULTUA Negative   NITRITE Negative   UROBILINOGEN Negative   LEUKOCYTESUR Negative       Significant Imaging: I have reviewed and interpreted all pertinent imaging results/findings within the past 24 hours.   6/5/18 Unremarkable noncontrast CT head specifically without evidence for acute intracranial hemorrhage.  Clinical correlation and further evaluation as warranted.  5/29/18 MRI brain per Dr. Rashid Age-appropriate generalized cerebral volume loss with mild chronic microvascular ischemic change.  No evidence for an acute infarction or enhancing lesion.

## 2018-06-06 NOTE — CONSULTS
[facility]  Cardiology  Consult Note    Patient Name: Jett Ballard  MRN: [id number]  Admission Date: 6/5/2018  Hospital Length of Stay: 0 days  Code Status: Full Code   Attending Provider: [place] Sow MD   Consulting Provider: JESENIA Smith  Primary Care Physician: Sana Tinsley NP  Principal Problem:<principal problem not specified>    Patient information was obtained from patient, past medical records and ER records.     Inpatient consult to [facility]  Consult performed by: PHILIP SOLIZ  Consult ordered by: KAREN DANIEL        Subjective:     Chief Complaint:  syncope     HPI: 52 year old male with HX of HTN and CVA presents to the ED after a syncopal episode. Apparently there was some reported slurred speech and facial droop that resolved prior to ER arrival. CT of head with no acute findings. MRI today. Denies chest pain, palpitations, or SOB.     Past Medical History:   Diagnosis Date    Hypercholesteremia     Hypertension     Left sided numbness     Leg weakness     Other and unspecified hyperlipidemia     Positional lightheadedness     Stroke     Tremor     Weakness        Past Surgical History:   Procedure Laterality Date    BACK SURGERY         Review of patient's allergies indicates:   Allergen Reactions    Adhesive Rash    Latex, natural rubber Rash       No current facility-administered medications on file prior to encounter.      Current Outpatient Prescriptions on File Prior to Encounter   Medication Sig    amlodipine-benazepril 5-10 mg (LOTREL) 5-10 mg per capsule     aspirin 81 MG Chew Take 162 mg by mouth once daily.     butalbital-acetaminophen-caffeine -40 mg (FIORICET, ESGIC) -40 mg per tablet Take 1 tablet by mouth 2 (two) times daily as needed for Headaches.    diazePAM (VALIUM) 10 MG Tab TAKE ONE TABLET BY MOUTH EVERY 8 HOURS AS NEEDED FOR ANXIETY    finasteride (PROSCAR) 5 mg tablet TAKE ONE TABLET BY MOUTH ONCE  DAILY    oxyCODONE-acetaminophen (PERCOCET)  mg per tablet Take 1 tablet by mouth every 8 (eight) hours as needed for Pain.    pantoprazole (PROTONIX) 40 MG tablet Take 1 tablet (40 mg total) by mouth once daily.    pentoxifylline (TRENTAL) 400 mg TbSR Take 1 tablet (400 mg total) by mouth 2 (two) times daily with meals.    primidone (MYSOLINE) 50 MG Tab Take 1/2 BID for 6 weeks, then one BID (Patient taking differently: Take 50 mg by mouth 2 (two) times daily. )    tamsulosin (FLOMAX) 0.4 mg Cp24 TAKE ONE CAPSULE BY MOUTH ONCE DAILY     Family History     Problem Relation (Age of Onset)    Cancer Father    Heart disease Father        Social History Main Topics    Smoking status: Never Smoker    Smokeless tobacco: Current User     Types: Snuff      Comment: 33 yr history of dip    Alcohol use No    Drug use: No    Sexual activity: Yes     Partners: Female     Birth control/ protection: None     ROS   Constitutional: weakness   Eyes: Negative    Respiratory: Negative    Cardiovascular: Negative   Gastrointestinal: Negative   Genitourinary: Negative   Musculoskeletal: Negative   Skin: Negative .   Neurological: syncope, slurred speech, facial droop   Objective:     Vital Signs (Most Recent):  Temp: 96.6 °F (35.9 °C) (06/06/18 0823)  Pulse: 71 (06/06/18 1048)  Resp: 18 (06/06/18 0823)  BP: (!) 159/92 (06/06/18 0823)  SpO2: 98 % (06/06/18 0823) Vital Signs (24h Range):  Temp:  [96.6 °F (35.9 °C)-97.9 °F (36.6 °C)] 96.6 °F (35.9 °C)  Pulse:  [53-92] 71  Resp:  [12-24] 18  SpO2:  [94 %-100 %] 98 %  BP: (142-186)/() 159/92     Weight: 75.8 kg (167 lb 1.6 oz)  Body mass index is 21.45 kg/m².    SpO2: 98 %  O2 Device (Oxygen Therapy): room air      Intake/Output Summary (Last 24 hours) at 06/06/18 1051  Last data filed at 06/06/18 0300   Gross per 24 hour   Intake              675 ml   Output              600 ml   Net               75 ml       Lines/Drains/Airways     Peripheral Intravenous Line                  Peripheral IV - Single Lumen 06/05/18 Left Antecubital 1 day                Physical Exam  General appearance: alert, appears stated age and cooperative  Head: Normocephalic, without obvious abnormality, atraumatic  Eyes: conjunctivae/corneas clear. PERRL  Neck: no carotid bruit, no JVD and supple, symmetrical, trachea midline  Lungs: clear to auscultation bilaterally, normal respiratory effort  Chest Wall: no tenderness  Heart: regular rate and rhythm, S1, S2 normal, no murmur, click, rub or gallop  Abdomen: soft, non-tender; bowel sounds normal; no masses,  no organomegaly  Extremities: Extremities normal, atraumatic, no cyanosis, clubbing, or edema  Pulses: Dorsalis Pedis R: 2+ (normal)/L: 2+ (normal)  Skin: Skin color, texture, turgor normal. No rashes or lesions  Neurologic: Normal mood and affect  Alert and oriented X 3  Significant Labs:   ABG: No results for input(s): PH, PCO2, HCO3, POCSATURATED, BE in the last 48 hours., Blood Culture: No results for input(s): LABBLOO in the last 48 hours., BMP:   Recent Labs  Lab 06/05/18 1823 06/06/18  0230   GLU 88 96    140   K 3.6 3.1*    104   CO2 30* 26   BUN 6 6   CREATININE 0.8 0.7   CALCIUM 10.2 9.3   MG 2.6  --    , CMP   Recent Labs  Lab 06/05/18 1823 06/06/18  0230    140   K 3.6 3.1*    104   CO2 30* 26   GLU 88 96   BUN 6 6   CREATININE 0.8 0.7   CALCIUM 10.2 9.3   PROT 8.3 6.8   ALBUMIN 4.6 3.8   BILITOT 0.5 0.6   ALKPHOS 78 64   AST 16 14   ALT 14 12   ANIONGAP 11 10   ESTGFRAFRICA >60 >60   EGFRNONAA >60 >60   , CBC   Recent Labs  Lab 06/05/18 1823 06/06/18  0230   WBC 5.47 5.43   HGB 16.3 14.8   HCT 49.4 44.2    240   , INR   Recent Labs  Lab 06/05/18 1823   INR 1.0   , Lipid Panel No results for input(s): CHOL, HDL, LDLCALC, TRIG, CHOLHDL in the last 48 hours.,   Pathology Results  (Last 10 years)    None       and Troponin   Recent Labs  Lab 06/05/18  1823 06/06/18  0230 06/06/18  0815   TROPONINI <0.006  <0.006 <0.006       Significant Imaging: Cardiac Cath: , CT scan: CT ABDOMEN PELVIS WITH CONTRAST: No results found for this visit on 06/05/18. and CT ABDOMEN PELVIS WITHOUT CONTRAST: No results found for this visit on 06/05/18., Echocardiogram: 2D echo with color flow doppler: No results found for this or any previous visit., EKG: , Stress Test:  and X-Ray:   Assessment and Plan:     Active Diagnoses:    Diagnosis Date Noted POA    Hypokalemia [E87.6] 06/06/2018 No    Fatigue [R53.83] 06/05/2018 Yes    Cervical stenosis of spinal canal [M48.02] 08/22/2016 Yes     Chronic    Essential hypertension [I10] 08/22/2016 Yes      Problems Resolved During this Admission:    Diagnosis Date Noted Date Resolved POA       VTE Risk Mitigation         Ordered     IP VTE LOW RISK PATIENT  Once      06/05/18 2140        DX:  Severe HA followed by Syncope questionable TIA   Prior syncope with CVA in past  Appears to have some clear CNS problems including tremor for which he is medicated, CVA, spine problems including Back surgery, current C spine problems  HTN- BP elevated.   generalized weakness.   Cardiac enzymes negative.   EKG with no ischemic changes.   Recently seen in clinic for first time. Referred by neurology for HTN.  Echocardiogram and calcium score pending.    PLAN: low BP has never been documented as cause of his syncopal attack. Would therefore continue to treat BP to goal  Amlodipine, benazepril  Clonidine PRN  Will avoid BB for low HR  Keep on tele  Neuro following        Brendon Benson, ANILP  Cardiology   [facility]  I attest that I have personally seen and examined this patient. I have reviewed and discussed the management in detail as outlined above.

## 2018-06-06 NOTE — ASSESSMENT & PLAN NOTE
Cervical stenosis of spinal canal (Chronic)-  Seen in [facility] yesterday per AREN Tinsley ; refer to pain management. 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

## 2018-06-06 NOTE — PLAN OF CARE
06/06/18 1319   Medicare Message   Important Message from Medicare regarding Discharge Appeal Rights Given to patient/caregiver;Explained to patient/caregiver;Signed/date by patient/caregiver   Date IMM was signed 06/06/18   Time IMM was signed 102

## 2018-06-06 NOTE — PLAN OF CARE
Problem: Patient Care Overview  Goal: Plan of Care Review  Outcome: Ongoing (interventions implemented as appropriate)  Patient had a good night. Slept well. SR-SB on telemetry. No neuro changes noted. POC reviewed and patient instructed to call for needs/asst.

## 2018-06-06 NOTE — PLAN OF CARE
06/06/18 1319   Medicare Message   Important Message from Medicare regarding Discharge Appeal Rights Given to patient/caregiver;Explained to patient/caregiver;Signed/date by patient/caregiver   Date IMM was signed 06/06/18   Time IMM was signed 0800

## 2018-06-06 NOTE — PLAN OF CARE
06/06/18 0809   NEWELL Message   Medicare Outpatient and Observation Notification regarding financial responsibility Given to patient/caregiver;Explained to patient/caregiver;Signed/date by patient/caregiver   Date NEWELL was signed 06/06/18   Time NEWELL was signed 0810

## 2018-06-06 NOTE — H&P
"[facility] Medicine  History & Physical    Patient Name: Jett Ballard  MRN: [id number]  Admission Date: 6/5/2018  Attending Physician: West Sow MD   Primary Care Provider: Sana Tinsley NP         Patient information was obtained from patient and ER records.     Subjective:     Principal Problem:Syncope    Chief Complaint:   Chief Complaint   Patient presents with    Loss of Consciousness        HPI: 51 YO WM brought to ER per EMS yesterday PM for episode of syncope; pt states he went home from clinic appnt and "caught a real bad headache" then he does not remember anything after that. He reports his "fiance' found him unconscious". EMS brought pt to ER and reported  possible facial droop. ER MD reported Patient had no facial droop on arrival here,   Prior to this even Pt presented to IM Clinic yesterday to establish care.  He reported that he was seeing Dr Basilio but is aggrivated with wait times. BP was elevated 160/110 in clinic.  He saw Dr Dickson  2 days ago; reportedly changed his benazapril to lotrel. 1st dose was yesterday am. Planned to follow up with  Dr Armijo 6/11. With blood work ordered.     Pt has history of CVA, follows with  Dr Rashid. Has dysphagia and left sided upper and lower ext weakness  Pt also has hx of Cervical stenosis of spinal canal (Chronic)- and was referred  to pain management. Prior chronic pain Rx per Dr. Basilio.   This am pt NAD. Pt does have slow and deliberate speech. Not sure if this is his baseline.   TNI negative x 2. Urine ok; K+ low - replaced this am. CT of head WNL; no acute findings.     Past Medical History:   Diagnosis Date    Hypercholesteremia     Hypertension     Left sided numbness     Leg weakness     Other and unspecified hyperlipidemia     Positional lightheadedness     Stroke     Tremor     Weakness        Past Surgical History:   Procedure Laterality Date    BACK SURGERY         Review of patient's " allergies indicates:   Allergen Reactions    Adhesive Rash    Latex, natural rubber Rash       No current facility-administered medications on file prior to encounter.      Current Outpatient Prescriptions on File Prior to Encounter   Medication Sig    amlodipine-benazepril 5-10 mg (LOTREL) 5-10 mg per capsule     aspirin 81 MG Chew Take 162 mg by mouth once daily.     butalbital-acetaminophen-caffeine -40 mg (FIORICET, ESGIC) -40 mg per tablet Take 1 tablet by mouth 2 (two) times daily as needed for Headaches.    diazePAM (VALIUM) 10 MG Tab TAKE ONE TABLET BY MOUTH EVERY 8 HOURS AS NEEDED FOR ANXIETY    finasteride (PROSCAR) 5 mg tablet TAKE ONE TABLET BY MOUTH ONCE DAILY    oxyCODONE-acetaminophen (PERCOCET)  mg per tablet Take 1 tablet by mouth every 8 (eight) hours as needed for Pain.    pantoprazole (PROTONIX) 40 MG tablet Take 1 tablet (40 mg total) by mouth once daily.    pentoxifylline (TRENTAL) 400 mg TbSR Take 1 tablet (400 mg total) by mouth 2 (two) times daily with meals.    primidone (MYSOLINE) 50 MG Tab Take 1/2 BID for 6 weeks, then one BID (Patient taking differently: Take 50 mg by mouth 2 (two) times daily. )    tamsulosin (FLOMAX) 0.4 mg Cp24 TAKE ONE CAPSULE BY MOUTH ONCE DAILY     Family History     Problem Relation (Age of Onset)    Cancer Father    Heart disease Father        Social History Main Topics    Smoking status: Never Smoker    Smokeless tobacco: Current User     Types: Snuff      Comment: 33 yr history of dip    Alcohol use No    Drug use: No    Sexual activity: Yes     Partners: Female     Birth control/ protection: None     Review of Systems   Constitutional: Negative.    HENT: Positive for dental problem. Negative for congestion, ear pain, sinus pressure, sneezing and sore throat.    Eyes: Negative.    Respiratory: Negative for cough, chest tightness, shortness of breath and wheezing.    Cardiovascular: Negative.  Negative for chest pain.    Gastrointestinal: Negative for abdominal pain, blood in stool, constipation, diarrhea, nausea and vomiting.   Genitourinary: Negative for difficulty urinating, dysuria and flank pain.   Musculoskeletal: Negative.  Negative for joint swelling.   Skin: Negative.    Neurological: Positive for tremors (chronic ) and headaches. Negative for dizziness and weakness. Speech difficulty: slurred speech noted but  may be baseline    Hematological: Negative.    Psychiatric/Behavioral: Negative.  Negative for behavioral problems and confusion.     Objective:     Vital Signs (Most Recent):  Temp: 96.9 °F (36.1 °C) (06/06/18 0411)  Pulse: 71 (06/06/18 0737)  Resp: 18 (06/06/18 0411)  BP: (!) 151/105 (06/06/18 0737)  SpO2: (!) 94 % (06/06/18 0411) Vital Signs (24h Range):  Temp:  [96.9 °F (36.1 °C)-97.9 °F (36.6 °C)] 96.9 °F (36.1 °C)  Pulse:  [56-92] 71  Resp:  [12-24] 18  SpO2:  [94 %-100 %] 94 %  BP: (142-186)/() 151/105     Weight: 75.8 kg (167 lb 1.6 oz)  Body mass index is 21.45 kg/m².    Physical Exam   Constitutional: He is oriented to person, place, and time. He appears well-developed and well-nourished.   HENT:   Head: Normocephalic and atraumatic.   Right Ear: External ear normal.   Left Ear: External ear normal.   Nose: Nose normal.   Mouth/Throat: Oropharynx is clear and moist.   Eyes: Conjunctivae and EOM are normal. Pupils are equal, round, and reactive to light. Right eye exhibits no discharge. Left eye exhibits no discharge. No scleral icterus.   Neck: Normal range of motion. Neck supple. No JVD present. No tracheal deviation present. No thyromegaly present.   Cardiovascular: Normal rate, regular rhythm, normal heart sounds and intact distal pulses.    No murmur heard.  Pulmonary/Chest: Effort normal and breath sounds normal. No respiratory distress. He has no wheezes. He has no rales. He exhibits no tenderness.   Abdominal: Soft. Bowel sounds are normal. He exhibits no distension and no mass. There is no  tenderness. There is no rebound and no guarding.   Musculoskeletal: Normal range of motion.   Lymphadenopathy:     He has no cervical adenopathy.   Neurological: He is alert and oriented to person, place, and time. He has normal reflexes. He displays normal reflexes. No cranial nerve deficit. He exhibits normal muscle tone. Coordination normal.   Slurred speech    Skin: Skin is warm and dry.   Psychiatric: He has a normal mood and affect. His behavior is normal. Judgment and thought content normal.         CRANIAL NERVES     CN III, IV, VI   Pupils are equal, round, and reactive to light.  Extraocular motions are normal.        Significant Labs:   ABGs: No results for input(s): PH, PCO2, HCO3, POCSATURATED, BE, TOTALHB, COHB, METHB, O2HB, POCFIO2 in the last 48 hours.  Blood Culture: No results for input(s): LABBLOO in the last 48 hours.  CBC:   Recent Labs  Lab 06/05/18 1823 06/06/18  0230   WBC 5.47 5.43   HGB 16.3 14.8   HCT 49.4 44.2    240     CMP:   Recent Labs  Lab 06/05/18 1823 06/06/18  0230    140   K 3.6 3.1*    104   CO2 30* 26   GLU 88 96   BUN 6 6   CREATININE 0.8 0.7   CALCIUM 10.2 9.3   PROT 8.3 6.8   ALBUMIN 4.6 3.8   BILITOT 0.5 0.6   ALKPHOS 78 64   AST 16 14   ALT 14 12   ANIONGAP 11 10   EGFRNONAA >60 >60     Cardiac Markers:   Recent Labs  Lab 06/05/18 1823   BNP <10     Lactic Acid: No results for input(s): LACTATE in the last 48 hours.  Lipid Panel: No results for input(s): CHOL, HDL, LDLCALC, TRIG, CHOLHDL in the last 48 hours.  Magnesium:   Recent Labs  Lab 06/05/18 1823   MG 2.6     Troponin:   Recent Labs  Lab 06/05/18 1823 06/06/18  0230   TROPONINI <0.006 <0.006     TSH:   Recent Labs  Lab 06/05/18 1823   TSH 0.684     Urine Culture: No results for input(s): LABURIN in the last 48 hours.  Urine Studies:   Recent Labs  Lab 06/05/18 1937   COLORU Yellow   APPEARANCEUA Clear   PHUR 7.0   SPECGRAV 1.010   PROTEINUA Negative   GLUCUA Negative   KETONESU Negative    BILIRUBINUA Negative   OCCULTUA Negative   NITRITE Negative   UROBILINOGEN Negative   LEUKOCYTESUR Negative       Significant Imaging: I have reviewed and interpreted all pertinent imaging results/findings within the past 24 hours.   6/5/18 Unremarkable noncontrast CT head specifically without evidence for acute intracranial hemorrhage.  Clinical correlation and further evaluation as warranted.  5/29/18 MRI brain per Dr. Rashid Age-appropriate generalized cerebral volume loss with mild chronic microvascular ischemic change.  No evidence for an acute infarction or enhancing lesion.    Assessment/Plan:     * Syncope    For the record no one witnessed a syncopal episode. This is per patient.   He is overmedicated with benzos and opioids in my opinion, which is complicating his neurological symptoms and clouding the clinical picture here. He needs to work on tapering his bzd and opioids with his pcp.   Follow up with Neurology as OP  Cards as OP          Hypokalemia      Oral supplement;   monitor        Essential hypertension      Uncontrolled; recent Change in anti-hypertensive Rx per cards         Cervical stenosis of spinal canal      Cervical stenosis of spinal canal (Chronic)-  Seen in [facility] yesterday per AREN Tinsley ; refer to pain management. Explained that NP can not rx controlled substances for chronic pain as an NP        Late effects of CVA (cerebrovascular accident)      Recent neuro eval with Dr. Rashid; EEG is pending per PCP. The patient has had multiple spells of vague weakness and other neurological complaints since stroke with Negative MRI prior. Prior neurologist suggested somatization as a possible cause as patient had events due to life stressor. PCP sent patient to psychiatry, [facility] recently.           VTE Risk Mitigation         Ordered     IP VTE LOW RISK PATIENT  Once      06/05/18 3625             Sree Villegas MD  Department of Hospital Medicine    [facility]

## 2018-06-06 NOTE — PROGRESS NOTES
Staff Handoff  Report received from Bridgette SALGADO and patient assessed per flowsheet. C/O headache, and chronic neck pain. BP high. Old CVA with left side weakness, hand tremors, neck tremors, uses cane at home. Generalized weakness, exp left side. Disoriented to time. Slurred speech. Oriented to room and POC reviewed. Instructed to call for needs/asst.    Resident Handoff

## 2018-06-06 NOTE — PROGRESS NOTES
Staff Handoff  Tisha Noble NP aware of BP, c/o headache and chest discomfort.    Resident Handoff

## 2018-06-06 NOTE — DISCHARGE SUMMARY
"[facility]  Discharge Summary      Patient Name: Jett Ballard  MRN: [id number]  Admission Date: 6/5/2018  Hospital Length of Stay: 0 days  Discharge Date and Time:  06/06/2018 12:33 PM  Attending Physician: West Sow MD   Discharging Provider: Tisha Noble NP  Primary Care Provider: Sana Tinsley NP      HPI:   51 YO WM brought to ER per EMS yesterday PM for episode of syncope; pt states he went home from clinic appnt and "caught a real bad headache" then he does not remember anything after that. He reports his "fiance' found him unconscious". EMS brought pt to ER and reported  possible facial droop. ER MD reported Patient had no facial droop on arrival here,   Prior to this even Pt presented to IM Clinic yesterday to establish care.  He reported that he was seeing Dr Basilio but is aggrivated with wait times. BP was elevated 160/110 in clinic.  He saw Dr Dickson  2 days ago; reportedly changed his benazapril to lotrel. 1st dose was yesterday am. Planned to follow up with  Dr Armijo 6/11. With blood work ordered.     Pt has history of CVA, follows with  Dr Rashid. Has dysphagia and left sided upper and lower ext weakness  Pt also has hx of Cervical stenosis of spinal canal (Chronic)- and was referred  to pain management. Prior chronic pain Rx per Dr. Basilio.   This am pt NAD. Pt does have slow and deliberate speech. Not sure if this is his baseline.   TNI negative x 2. Urine ok; K+ low - replaced this am. CT of head WNL; no acute findings.     * No surgery found *      Hospital Course:   No notes on file     Consults:   Consults         Status Ordering Provider     Inpatient consult to Cardiology-[facility]  Once     Provider:  Dale Armijo MD    Acknowledged KAREN DANIEL     Inpatient consult to Neurology  Once     Provider:  Felipe Rashid MD    Acknowledged KAREN DANIEL.          No new Assessment & Plan notes have been filed under this hospital " service since the last note was generated.  Service: Hospital Medicine    Final Active Diagnoses:    Diagnosis Date Noted POA    PRINCIPAL PROBLEM:  Syncope [R55] 06/06/2018 Yes    Hypokalemia [E87.6] 06/06/2018 No    Cervical stenosis of spinal canal [M48.02] 08/22/2016 Yes     Chronic    Essential hypertension [I10] 08/22/2016 Yes    Late effects of CVA (cerebrovascular accident) [I69.90] 06/01/2015 Not Applicable      Problems Resolved During this Admission:    Diagnosis Date Noted Date Resolved POA       Discharged Condition: good    Disposition: Home or Self Care    Follow Up:  Follow-up Information     Sana Tinsley NP In 10 days.    Specialty:  Family Medicine  Contact information:  [address] [facility] [address]  [phone]             Dale Armijo MD.    Specialty:  Cardiology  Why:  has appnt on 6/11/18   Contact information:  [address]   [facility] [address]  [phone]             Felipe Rashid MD. Schedule an appointment as soon as possible for a visit in 2 weeks.    Specialty:  Neurology  Contact information:  [address]  [facility] [address]  [phone]                 Patient Instructions:   No discharge procedures on file.    Significant Diagnostic Studies:   Recent Labs  Lab 06/05/18  1823 06/06/18  0230   WBC 5.47 5.43   HGB 16.3 14.8   HCT 49.4 44.2    240      CMP:   Recent Labs  Lab 06/05/18  1823 06/06/18  0230    140   K 3.6 3.1*    104   CO2 30* 26   GLU 88 96   BUN 6 6   CREATININE 0.8 0.7   CALCIUM 10.2 9.3   PROT 8.3 6.8   ALBUMIN 4.6 3.8   BILITOT 0.5 0.6   ALKPHOS 78 64   AST 16 14   ALT 14 12   ANIONGAP 11 10   EGFRNONAA >60 >60      Cardiac Markers:   Recent Labs  Lab 06/05/18  1823   BNP <10      Lactic Acid: No results for input(s): LACTATE in the last 48 hours.  Lipid Panel: No results for input(s): CHOL, HDL, LDLCALC, TRIG, CHOLHDL in the last 48 hours.  Magnesium:   Recent Labs  Lab 06/05/18  1823   MG 2.6      Troponin:   Recent  Labs  Lab 06/05/18  1823 06/06/18  0230   TROPONINI <0.006 <0.006      TSH:   Recent Labs  Lab 06/05/18  1823   TSH 0.684      Urine Culture: No results for input(s): LABURIN in the last 48 hours.  Urine Studies:   Recent Labs  Lab 06/05/18  1937   COLORU Yellow   APPEARANCEUA Clear   PHUR 7.0   SPECGRAV 1.010   PROTEINUA Negative   GLUCUA Negative   KETONESU Negative   BILIRUBINUA Negative   OCCULTUA Negative   NITRITE Negative   UROBILINOGEN Negative   LEUKOCYTESUR Negative         Significant Imaging: I have reviewed and interpreted all pertinent imaging results/findings within the past 24 hours.   6/5/18 Unremarkable noncontrast CT head specifically without evidence for acute intracranial hemorrhage.  Clinical correlation and further evaluation as warranted.  5/29/18 MRI brain per Dr. Rashid Age-appropriate generalized cerebral volume loss with mild chronic microvascular ischemic change.  No evidence for an acute infarction or enhancing lesion.    Pending Diagnostic Studies:     Procedure Component Value Units Date/Time    MRI Brain Without Contrast [[id number]]     Order Status:  Sent Lab Status:  In process          Medications:  Reconciled Home Medications:      Medication List      CHANGE how you take these medications    primidone 50 MG Tab  Commonly known as:  MYSOLINE  Take 1/2 BID for 6 weeks, then one BID  What changed:  · how much to take  · how to take this  · when to take this  · additional instructions        CONTINUE taking these medications    amlodipine-benazepril 5-10 mg 5-10 mg per capsule  Commonly known as:  LOTREL     aspirin 81 MG Chew  Take 162 mg by mouth once daily.     butalbital-acetaminophen-caffeine -40 mg -40 mg per tablet  Commonly known as:  FIORICET, ESGIC  Take 1 tablet by mouth 2 (two) times daily as needed for Headaches.     diazePAM 10 MG Tab  Commonly known as:  VALIUM  TAKE ONE TABLET BY MOUTH EVERY 8 HOURS AS NEEDED FOR ANXIETY     finasteride 5 mg  tablet  Commonly known as:  PROSCAR  TAKE ONE TABLET BY MOUTH ONCE DAILY     oxyCODONE-acetaminophen  mg per tablet  Commonly known as:  PERCOCET  Take 1 tablet by mouth every 8 (eight) hours as needed for Pain.     pantoprazole 40 MG tablet  Commonly known as:  PROTONIX  Take 1 tablet (40 mg total) by mouth once daily.     pentoxifylline 400 mg Tbsr  Commonly known as:  TRENTAL  Take 1 tablet (400 mg total) by mouth 2 (two) times daily with meals.     tamsulosin 0.4 mg Cp24  Commonly known as:  FLOMAX  TAKE ONE CAPSULE BY MOUTH ONCE DAILY            Indwelling Lines/Drains at time of discharge:   Lines/Drains/Airways          No matching active lines, drains, or airways          Time spent on the discharge of patient: 20 minutes  Patient was seen and examined on the date of discharge and determined to be suitable for discharge.         Tisha Noble NP  Department of Hospital Medicine  [facility]

## 2018-06-06 NOTE — PLAN OF CARE
06/06/18 1325   Final Note   Assessment Type Final Discharge Note   Discharge Disposition Home   What phone number can be called within the next 1-3 days to see how you are doing after discharge? [phone]   Hospital Follow Up  Appt(s) scheduled? Yes   Discharge plans and expectations educations in teach back method with documentation complete? Yes   Right Care Referral Info   Post Acute Recommendation No Care     Home with his jack. No concerns.

## 2018-06-06 NOTE — ASSESSMENT & PLAN NOTE
Uncontrolled; recent Change in anti-hypertensive Rx per cards    [facility] - PRIMARY CARE SKIN     CC : Acne and Oral Isotretinoin Follow-up   SUBJECTIVE:      Shaun Moise is a 15 year old male who presents to clinic today with mother for follow-up of oral isotretinoin therapy for severe, recalcitrant acne.     Months completed : 6  Current dosage : 60 mg am 40 mg pm  Treatment response : Acne control has continued to improve.one papule on the face this month.   Side effects noted : Dryness of eyes and lips has become more manageable. Shaun has been taking care to keep his eyes well moisturized with contact lens usage. He denies any joint pains or mood changes.       Refer to electronic medical record (EMR) for past medical history and medications.     INTEGUMENTARY/SKIN: POSITIVE for acne  ROS : 14 point review of systems was negative except the symptoms listed above in the HPI.      OBJECTIVE:      GENERAL: healthy, alert and no distress  SKIN: Dixon Skin Type - II.  Face and Trunk were examined. The dermatoscope was used to help evaluate pigmented lesions.  Skin Pertinent Findings:  Face : no new lesions     Trunk :  Residual scars on the upper back nand       No results found for this or any previous visit (from the past 24 hour(s)).       MDM : hopefully one more month        ASSESSMENT:              Encounter Diagnosis   Name Primary?     Acne vulgaris        Comment : severe acne, oral isotretinoin treatment with monthly monitoring.        PLAN:        Patient Instructions   Recheck in one months       PROCEDURES:      None.     TT : 20 minutes.  CT : 15 minutes, with 50% of time spent reviewing lab work and counseling patient about side effects, benefits and risks of oral isotretinoin.

## 2018-06-06 NOTE — ASSESSMENT & PLAN NOTE
Recent neuro eval with Dr. Rashid; EEG is pending per PCP. The patient has had multiple spells of vague weakness and other neurological complaints since stroke with Negative MRI prior. Prior neurologist suggested somatization as a possible cause as patient had events due to life stressor. PCP sent patient to psychiatry, [facility] recently.

## 2018-06-07 ENCOUNTER — PATIENT MESSAGE (OUTPATIENT)
Dept: INTERNAL MEDICINE | Facility: CLINIC | Age: 53
End: 2018-06-07

## 2018-06-08 ENCOUNTER — NURSE TRIAGE (OUTPATIENT)
Dept: ADMINISTRATIVE | Facility: CLINIC | Age: 53
End: 2018-06-08

## 2018-06-09 NOTE — TELEPHONE ENCOUNTER
"Pt called re cold sx yest. RN. Told not to take OTC.     Reason for Disposition   Cold with no complications  (all triage questions negative)    Answer Assessment - Initial Assessment Questions  1. ONSET: "When did the nasal discharge start?"      Cold sx, might have had fever this am.   2. AMOUNT: "How much discharge is there?"      Nose dripping a lot, sneezing. No cough, denies taking allergy meds   3. COUGH: "Do you have a cough?" If yes, ask: "Describe the color of your sputum" (clear, white, yellow, green)     No   4. RESPIRATORY DISTRESS: "Describe your breathing."     No CP, no SOB, at friends house.   5. FEVER: "Do you have a fever?" If so, ask: "What is your temperature, how was it measured, and when did it start?"    Not checked   6. SEVERITY: "Overall, how bad are you feeling right now?" (e.g., doesn't interfere with normal activities, staying home from school/work, staying in bed)     Feeling bad   7. OTHER SYMPTOMS: "Do you have any other symptoms?" (e.g., sore throat, earache, wheezing, vomiting)    No    Protocols used:  [facility] ED for fever, SOB. Call back with questions.   "

## 2018-06-11 ENCOUNTER — TELEPHONE (OUTPATIENT)
Dept: INTERNAL MEDICINE | Facility: CLINIC | Age: 53
End: 2018-06-11

## 2018-06-11 DIAGNOSIS — J01.90 ACUTE SINUSITIS, RECURRENCE NOT SPECIFIED, UNSPECIFIED LOCATION: Primary | ICD-10-CM

## 2018-06-11 RX ORDER — AMOXICILLIN 500 MG/1
500 CAPSULE ORAL 3 TIMES DAILY
Qty: 30 CAPSULE | Refills: 0 | Status: SHIPPED | OUTPATIENT
Start: 2018-06-11 | End: 2018-06-20

## 2018-06-11 NOTE — TELEPHONE ENCOUNTER
Acacia stated he was seen by dr suero today and has lost 8lbs in 5 days. Dr suero stated he needed to see ana lilia as soon as possible. Also having left breast tenderness.

## 2018-06-11 NOTE — TELEPHONE ENCOUNTER
----- Message from Helen Alcaraz sent at 2018 11:44 AM CDT -----  Contact: SASHA / GOLD   Jett Ballard  MRN: [id number]  : 1965  PCP: Sana Tinsley  Home Phone      [phone]  Work Phone      Not on file.  Mobile          [phone]      MESSAGE: SAID THAT HE NEEDS SOMETHING CALLED IN FOR A COLD. PLEASE CALL     PHONE: [phone]    PHARMACY: [facility] IN [place]

## 2018-06-12 NOTE — TELEPHONE ENCOUNTER
Informed pts wife that abx had been called in. Also addressed again about gettting sooner appt than the 20th for the weight loss and pts wife wants to keep that appt.

## 2018-06-12 NOTE — TELEPHONE ENCOUNTER
Contacted pt wife I explained to her that there were no opening for cindy asked if I can schedule with another provider wife said no will keep appt with cindy on 6/20/2018@11:15 am pt wife said pt has head cold would like to know if something can be sent in or what do provider recommend OTC med, please advise.[facility] ,KELLY

## 2018-06-15 ENCOUNTER — TELEPHONE (OUTPATIENT)
Dept: INTERNAL MEDICINE | Facility: CLINIC | Age: 53
End: 2018-06-15

## 2018-06-15 NOTE — TELEPHONE ENCOUNTER
Informed pts wife that if Dr Armijo said he needed to be on cholesterol meds, then he would order them. We don't even have the results of the testing that they did to make that call. There's no way we can make the call at the moment. Informed her that normally Dr Armijo handles those things, not sure why the nurse told them to check with us.

## 2018-06-15 NOTE — TELEPHONE ENCOUNTER
----- Message from Helen Alcaraz sent at 6/15/2018  1:26 PM CDT -----  Jett Ballard  MRN: [id number]  : 1965  PCP: Sana Tinsley  Home Phone      [phone]  Work Phone      Not on file.  Mobile          [phone]      MESSAGE: WANTS TO SPEAK WITH NURSE REGARDING TEST RESULTS FROM DR SOLIZ'S OFFICE. SAID THAT HE NEEDS TO GET ON CHOLESTEROL MEDICATION. PLEASE CALL     PHONE: [phone]    PHARMACY: [facility] [place]

## 2018-06-20 ENCOUNTER — OFFICE VISIT (OUTPATIENT)
Dept: INTERNAL MEDICINE | Facility: CLINIC | Age: 53
End: 2018-06-20
Payer: MEDICARE

## 2018-06-20 VITALS
WEIGHT: 166.44 LBS | RESPIRATION RATE: 18 BRPM | HEART RATE: 97 BPM | HEIGHT: 74 IN | DIASTOLIC BLOOD PRESSURE: 82 MMHG | OXYGEN SATURATION: 99 % | SYSTOLIC BLOOD PRESSURE: 130 MMHG | BODY MASS INDEX: 21.36 KG/M2

## 2018-06-20 DIAGNOSIS — M54.2 NECK PAIN: ICD-10-CM

## 2018-06-20 DIAGNOSIS — N40.0 BENIGN NON-NODULAR PROSTATIC HYPERPLASIA WITHOUT LOWER URINARY TRACT SYMPTOMS: ICD-10-CM

## 2018-06-20 DIAGNOSIS — I69.90 LATE EFFECTS OF CVA (CEREBROVASCULAR ACCIDENT): ICD-10-CM

## 2018-06-20 DIAGNOSIS — I10 ESSENTIAL HYPERTENSION: Primary | ICD-10-CM

## 2018-06-20 DIAGNOSIS — F33.41 RECURRENT MAJOR DEPRESSIVE DISORDER, IN PARTIAL REMISSION: ICD-10-CM

## 2018-06-20 PROCEDURE — 99999 PR STA SHADOW: CPT | Mod: PBBFAC,,, | Performed by: NURSE PRACTITIONER

## 2018-06-20 PROCEDURE — 99999 PR PBB SHADOW E&M-EST. PATIENT-LVL IV: CPT | Mod: PBBFAC,,, | Performed by: NURSE PRACTITIONER

## 2018-06-20 PROCEDURE — 99214 OFFICE O/P EST MOD 30 MIN: CPT | Mod: S$PBB | Performed by: NURSE PRACTITIONER

## 2018-06-20 PROCEDURE — 99214 OFFICE O/P EST MOD 30 MIN: CPT | Mod: PBBFAC | Performed by: NURSE PRACTITIONER

## 2018-06-20 NOTE — PROGRESS NOTES
Subjective:       Patient ID: Jett Ballard is a 52 y.o. male.    Chief Complaint: Hypertension    HPI: Pt presents to clinic today known to me for HTN f/u. He went to Dr Armijo the very next week. His bp is much better today. He is now taking 2 bp pills and is much better controlled,. He has f/u with JUANITO Chong today for neuro and will speak with her today about getting back on cogentin due to his shaking is not well controlled    Still scheduled for colonosocpy. For his complaint of wt loss. 3 pounds down since last visit    He also reports that he has noticed some swelling in breast of left. He is on flomax and proscar. 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 Negative for chills and fever.   HENT: Negative for congestion, postnasal drip and sore throat.    Eyes: Negative for photophobia.   Respiratory: Negative for chest tightness and shortness of breath.    Cardiovascular: Negative for chest pain.   Gastrointestinal: Negative for abdominal distention, abdominal pain, blood in stool and vomiting.   Genitourinary: Negative for dysuria, flank pain and hematuria.   Musculoskeletal: Negative for back pain.   Skin: Negative for pallor.   Neurological: Positive for tremors. Negative for dizziness, seizures, facial asymmetry, speech difficulty and numbness.   Hematological: Does not bruise/bleed easily.   Psychiatric/Behavioral: Negative for agitation and suicidal ideas. The patient is not nervous/anxious.        Objective:      Physical Exam   Constitutional: He is oriented to person, place, and time. He appears well-developed and well-nourished.   HENT:   Head: Normocephalic and atraumatic.   Neck: No JVD present.   Cardiovascular: Normal rate, regular rhythm and normal heart sounds.    No murmur heard.  Pulmonary/Chest: Effort normal and breath sounds normal. No respiratory distress. He has no wheezes. He has no rales.   Abdominal:  "Soft. Bowel sounds are normal. There is no tenderness.   Musculoskeletal: He exhibits no edema.   Left breast swollen and tender, no redness or heat   Neurological: He is alert and oriented to person, place, and time.   Skin: Skin is warm and dry.   Psychiatric: He has a normal mood and affect. His behavior is normal. Judgment and thought content normal.   Nursing note and vitals reviewed.      Assessment:       1. Essential hypertension    2. Recurrent major depressive disorder, in partial remission    3. Late effects of CVA (cerebrovascular accident)    4. Benign non-nodular prostatic hyperplasia without lower urinary tract symptoms        Plan:     Problem List Items Addressed This Visit     Late effects of CVA (cerebrovascular accident)    Recurrent major depressive disorder, in partial remission    Benign non-nodular prostatic hyperplasia without lower urinary tract symptoms    Essential hypertension - Primary        Get to Dr Pennington. He has some left breast swelling. Consider changing proscar  Cont f/u with Dr Armijo.   Cont f/u with neurology.   I again told him he needs psych  Check colonoscopy for his complaints of wt loss. "This note will not be shared with the patient."    "

## 2018-06-22 RX ORDER — OXYCODONE AND ACETAMINOPHEN 10; 325 MG/1; MG/1
1 TABLET ORAL EVERY 8 HOURS PRN
Qty: 90 TABLET | Refills: 0 | Status: SHIPPED | OUTPATIENT
Start: 2018-06-22 | End: 2018-06-22 | Stop reason: SDUPTHER

## 2018-06-22 NOTE — TELEPHONE ENCOUNTER
I would really like this patient to come in for a visit as soon as possible.  I have a new theory on his issues and would like to discuss medications to see effects.

## 2018-06-25 RX ORDER — OXYCODONE AND ACETAMINOPHEN 10; 325 MG/1; MG/1
1 TABLET ORAL EVERY 8 HOURS PRN
Qty: 90 TABLET | Refills: 0 | Status: SHIPPED | OUTPATIENT
Start: 2018-06-25 | End: 2018-07-06 | Stop reason: ALTCHOICE

## 2018-07-05 RX ORDER — SODIUM, POTASSIUM,MAG SULFATES 17.5-3.13G
354 SOLUTION, RECONSTITUTED, ORAL ORAL ONCE
Qty: 354 ML | Refills: 0 | Status: SHIPPED | OUTPATIENT
Start: 2018-07-18 | End: 2018-07-10

## 2018-07-05 RX ORDER — BISACODYL 5 MG
20 TABLET, DELAYED RELEASE (ENTERIC COATED) ORAL ONCE
Qty: 4 TABLET | Refills: 0 | Status: ON HOLD | OUTPATIENT
Start: 2018-07-18 | End: 2018-07-19 | Stop reason: HOSPADM

## 2018-07-06 ENCOUNTER — HOSPITAL ENCOUNTER (OUTPATIENT)
Dept: PREADMISSION TESTING | Facility: HOSPITAL | Age: 53
Discharge: HOME | End: 2018-07-06
Attending: COLON & RECTAL SURGERY
Payer: MEDICARE

## 2018-07-06 ENCOUNTER — ANESTHESIA EVENT (OUTPATIENT)
Dept: ENDOSCOPY | Facility: HOSPITAL | Age: 53
End: 2018-07-06
Payer: MEDICARE

## 2018-07-06 VITALS — WEIGHT: 166.44 LBS | HEIGHT: 74 IN | BODY MASS INDEX: 21.36 KG/M2

## 2018-07-06 DIAGNOSIS — Z12.11 ENCOUNTER FOR SCREENING COLONOSCOPY: Primary | ICD-10-CM

## 2018-07-06 NOTE — DISCHARGE INSTRUCTIONS
Follow instructions as written on Dr. Carrera's Colonoscopy Prep Sheet      Colonoscopy Outpatient procedure instructions    Prep Review  Nothing to eat or drink after midnight unless your doctor tells you differently. Dr. Carrera patients have nothing to eat or drink after morning prep is complete      Take medications as instructed by your doctor.    Wear something comfortable that is easy for you to take off and put on.   Do not wear any makeup, jewelry, or body piercings. Leave valuables at home or let your family member keep them for you. Do not bring them to the Surgery area.     Date/Day of Procedure: Thursday 7-19-18    Arrival time: Someone will call you the workday day before the procedure    between 1pm and 5pm to give you an arrival time   If asked to report to the hospital before 7:00 am report to the Emergency Room.  If asked to report to the hospital at 7:00 a.m. or later report to Patient Registration  It is not necessary to report earlier than the time you are told.   Ignore any automated/computer generated calls telling to what time to report to the hospital.   Plan to be at the hospital for about 4 hours, however, it could be longer.

## 2018-07-06 NOTE — LETTER
[facility] [address] [place] [address]  [phone]              Cardiac Clearance Form    PLEASE PROVIDE ALL INFORMATION      Date : 2018  Dr. Armijo,    Please provide us with WRITTEN Cardiac Clearance on Mr Jett Ballard.  : 1965.        Please send any test results such as : EKG, Holter Monitor, Echocardiogram and or Stress tests.      Patient will be scheduled for a colonoscopy under General/MAC anesthesia scheduled for 18.  Patient is taking Aspirin.  How long can patient be off Aspirin?     PLEASE FAX THIS CLEARANCE WITH TEST RESULTS -[phone].  Thank you for your help in this matter.    Sincerely,    Surgery Scheduler  Isabela Reyes RN

## 2018-07-10 ENCOUNTER — OFFICE VISIT (OUTPATIENT)
Dept: NEUROLOGY | Facility: CLINIC | Age: 53
End: 2018-07-10
Payer: MEDICARE

## 2018-07-10 VITALS
RESPIRATION RATE: 16 BRPM | WEIGHT: 170.19 LBS | BODY MASS INDEX: 21.84 KG/M2 | HEART RATE: 104 BPM | HEIGHT: 74 IN | DIASTOLIC BLOOD PRESSURE: 88 MMHG | SYSTOLIC BLOOD PRESSURE: 128 MMHG

## 2018-07-10 DIAGNOSIS — F41.9 ANXIETY AND DEPRESSION: ICD-10-CM

## 2018-07-10 DIAGNOSIS — F32.A ANXIETY AND DEPRESSION: ICD-10-CM

## 2018-07-10 DIAGNOSIS — R55 SYNCOPE, UNSPECIFIED SYNCOPE TYPE: ICD-10-CM

## 2018-07-10 DIAGNOSIS — R42 POSITIONAL LIGHTHEADEDNESS: Chronic | ICD-10-CM

## 2018-07-10 DIAGNOSIS — R25.9 MIXED ACTION AND RESTING TREMOR: Primary | ICD-10-CM

## 2018-07-10 DIAGNOSIS — G47.00 INSOMNIA, UNSPECIFIED TYPE: ICD-10-CM

## 2018-07-10 DIAGNOSIS — R41.3 MEMORY LOSS: ICD-10-CM

## 2018-07-10 PROCEDURE — 99214 OFFICE O/P EST MOD 30 MIN: CPT | Mod: PBBFAC | Performed by: NURSE PRACTITIONER

## 2018-07-10 PROCEDURE — 99999 PR STA SHADOW: CPT | Mod: PBBFAC,,, | Performed by: NURSE PRACTITIONER

## 2018-07-10 PROCEDURE — 99999 PR PBB SHADOW E&M-EST. PATIENT-LVL IV: CPT | Mod: PBBFAC,,, | Performed by: NURSE PRACTITIONER

## 2018-07-10 PROCEDURE — 99215 OFFICE O/P EST HI 40 MIN: CPT | Mod: S$PBB | Performed by: NURSE PRACTITIONER

## 2018-07-10 RX ORDER — DULOXETIN HYDROCHLORIDE 30 MG/1
30 CAPSULE, DELAYED RELEASE ORAL DAILY
Qty: 30 CAPSULE | Refills: 11 | Status: SHIPPED | OUTPATIENT
Start: 2018-07-10 | End: 2018-08-13

## 2018-07-10 RX ORDER — PRIMIDONE 50 MG/1
50 TABLET ORAL NIGHTLY
Qty: 30 TABLET | Refills: 11 | Status: SHIPPED | OUTPATIENT
Start: 2018-07-10 | End: 2018-08-13

## 2018-07-10 NOTE — PROGRESS NOTES
HPI: Jett Ballard is a 52 y.o. male with PMHx of Right thalamic CVA (5/15) and was previously followed by an outside neurologist, Dr Lorenzo for tremors and memory loss since then. He had a spell of slurred speech and bilateral weakness/facial tingling in 5/2018.     Patient presents today for a follow up visit. He saw Cardiology for his orthostatic complaints. His antihypertensive medication was adjusted, and his orthostatic complaints are much improved. He never proceeded with EEG ordered per PCP, due to cost concerns.     He was seen in the ED and was admitted to [facility] in 6/2018 for headache, followed by syncope, memory loss, and questionable facial droop per partner. Syncope was not witnessed. Head CT unremarkable. In reviewing his hospital notes from Dr. Villegas from this admit, polypharmacy was believed to contribute to some of his complaints. No further syncopal episodes since his ER admit.     His PCP recently discontinued many of his medications, including Primidone, Oxycodone, Valium, and Gabapentin. His tremor is worse and interferes with many activities. He took Cogentin for his tremor prior, which was helpful; however, this was stopped by patient, due to orthostatic complaints.     His neck pain is worse. He is not seeing anyone currently for pain management.     His anxiety is much worse. His medication history includes Zoloft, which worsened his agitation, and Lexapro; however, he is unsure of it's effect. Remeron and Pamelor are also listed in his medication history; however, he is not sure why these were prescribed or what the effect was.     He continues with much agitation, as well as some depression, since his CVA, but worse lately. No suicidal ideations. He is no longer seeing [facility]. He saw Dr. Quinones prior.     He complains of insomnia since stopping his medications.     He reports worsening memory loss, which began after his CVA. He cannot remember what he went  into a room for lately.     Headaches occur rarely.     He reports much dissatisfaction with his former PCP throughout his visit, as well as dissatisfaction with providers at [facility].     Review of Systems   Constitutional: Negative for fever.   HENT: Negative for nosebleeds.    Eyes: Negative for double vision.   Respiratory: Negative for hemoptysis.    Cardiovascular: Negative for leg swelling.   Gastrointestinal: Negative for blood in stool.   Genitourinary: Negative for hematuria.   Musculoskeletal: Positive for neck pain. Negative for falls.   Skin: Negative for rash.   Neurological: Positive for tremors, sensory change and headaches. Negative for dizziness, tingling, speech change and loss of consciousness.   Psychiatric/Behavioral: Positive for depression and memory loss. The patient is nervous/anxious and has insomnia.        I have reviewed all of this patient's past medical and surgical histories as well as family and social histories and active allergies and medications as documented in the electronic medical record.    Exam:  Gen Appearance, well developed/nourished in no apparent distress  CV: 2+ distal pulses with no edema or swelling  Neuro:  MS: Awake, alert, Sustains attention but mildly slurred speech since CVA is noted. Recent/remote memory intact, Language is full to spontaneous speech/comprehension. Fund of Knowledge is full  CN: Optic discs are flat with normal vasculature, PERRL, Extraoccular movements and visual fields are full. Normal facial sensation and strength, Hearing symmetric, Tongue and Palate are midline and strong. Shoulder Shrug symmetric and strong.  Motor: Normal bulk, tone, no abnormal movements at rest, but tremor of both hands with activity and movement. 5/5 strength bilateral upper/lower extremities with 2+ reflexes and no clonus  Right fifth finger is contracted.   Sensory: symmetric to light touch, pain, temp, and vibration except decreased to temp on the left side  chronically.  Romberg negative  Cerebellar: Finger-nose,Heal-shin, Rapid alternating movements intact  Gait: Normal stance-using cane due to mild left hemiparesis from CVA    Imagin2018 MRI Brain:   COMPARISON:  2018    FINDINGS:  There is no evidence for acute intracranial hemorrhage or sulcal effacement.  The ventricles are normal in size without hydrocephalus.  There is no midline shift or mass effect.  Visualized paranasal sinuses and mastoid air cells are clear.   Impression       Unremarkable noncontrast CT head specifically without evidence for acute intracranial hemorrhage.  Clinical correlation and further evaluation as warranted.       2018 MRI Brain:  COMPARISON:  2018    FINDINGS:  Intracranial compartment:    Ventricles and sulci are normal in size for age without evidence of hydrocephalus. No extra-axial blood or fluid collections.    There are few scattered foci of T2/FLAIR signal abnormality in the supratentorial white matter in keeping with chronic microvascular ischemic disease of a mild degree.  No mass lesion, acute hemorrhage, edema or acute infarct. No abnormal enhancement.    Normal vascular flow voids are preserved.    Skull/extracranial contents (limited evaluation): Bone marrow signal intensity is normal.   Impression       Age-appropriate generalized cerebral volume loss with mild chronic microvascular ischemic change.  No evidence for an acute infarction or enhancing lesion.     3/2017 CT head: No acute intracranial process.   No significant change.    2017 MRI brain:   1.  No MRI evidence of an acute intracranial abnormality.  2.  Minimal stable early small vessel ischemic changes.    2016 MRI Brain: N no evidence of acute infarction.  No appreciable change as compared to the MRI 06/10/2015 with several small nonspecific T2 hyperintensities evident in the white matter which may reflect small nonspecific areas of gliosis or perhaps very mild microvascular ischemic  change including subtle somewhat linear appearing T2 hyperintense intensity adjacent to head and body of caudate on the left.    MRI brain 2015: No evidence of acute infarction.      Small nonspecific area of T2 hyperintensity adjacent to left head of caudate may reflect nonspecific area of gliosis with additional punctate focus nonspecific T2 hyperintensity in the left parietal subcortical white matter    2015 MRA brain: Punctate acute/subacute infarction involving the right thalamus without associated hemorrhage, mass-effect or midline shift.    Age-appropriate generalized cerebral volume loss with mild degree of chronic microvascular ischemic disease    2015 CTA head: Temporal evolution of the previously noted punctate right thalamic infarction with minimal hypodensity now visualized in this location.  Otherwise, unremarkable  CT of the head specifically without evidence for acute hemorrhage or abnormal parenchymal   enhancement.    Unremarkable CTA of the head specifically without evidence for focal stenosis or intracranial aneurysm.    5/2015 Carotid US: #1. Findings consistent with less than 50% stenosis in the Right carotid artery bulb.    #2. Findings consistent with less than 50% stenosis in the Left carotid artery bulb.    9/2015 EEG: IMPRESSION: This is a normal awake and drowsy EEG. It is   important to note   that patients with epilepsy may have a normal EEG. Clinical   correlation is   therefore necessary.    EMG 2016: This is a normal study of bilateral upper and lower   extremities without   significant evidence of neuropathy or radiculopathy.    1.2018 CT head: Unremarkable noncontrast CT head specifically without evidence for acute intracranial hemorrhage. Further evaluation as warrented clinically.    2.2017 MRI brain: 1.  No MRI evidence of an acute intracranial abnormality.  2.  Minimal stable early small vessel ischemic changes.    5/2018 CT head: No acute intracranial abnormality.    4/2016  MRI C spine: The study is significantly degraded by motion artifact with bulging of the C4-5 disc suspected resulting in effacement of ventral subarachnoid space and with mild/moderate flattening of the ventral cord and overall mild degree of canal stenosis.  Small left paracentral protrusion of the C6-7 disc noted with effacement of ventral subarachnoid space and question of mild flattening of the left ventral cord a low with mild bulging of the C5-6 disc suspected.    Xray right finger noted (subluxation deformity)    5/2018 labs reviewed/ UDS negative    Assessment/Plan: Jett Ballard is a 52 y.o. male with PMHx of Right thalamic CVA (5/15) and was previously followed by an outside neurologist, Dr Lorenzo for tremors and memory loss since then. He had a spell of slurred speech and bilateral weakness/facial tingling in 5/2018.     I recommend:   1. Order Datscan to rule out PD, given his mixed tremor complaints; however, somatization is suspected, in part, in this patient.   2. Refer for neuropsych testing to evaluate his memory loss. He has anxiety/depression, chronic pain, and prior CVA, all of which could be contributing. No polypharmacy currently.   3. Start Cymbalta 30 mg for anxiety/depression. This may also help his chronic pain. Zoloft worsened his mood prior, and unknown response to Lexapro. Advised to start Cymbalta 1 week after increasing Primidone. He is no longer seeing [facility] for reasons, which are unclear. If mood not improved with Cymbalta, I will refer him back there. Depakote is listed on his medication history, though it is unknown who prescribed this to him and for what reason.   4. Restart Primidone for essential tremor. He also took Cogentin for tremor prior, but this was stopped, due to orthostatic complaints. Consider restarting this as well, pending response to Primidone.   5. Advised to change positions slowly to reduce orthostatic complaints. Improved since med  "adjustments per Cardiology. Never completed EEG per PCP, due to cost concerns; however, I do not suspect his episodes of dizziness to be seizure related.   6. He uses Fioricet PRN for headache - currently improved  7. Patient does not drive since CVA- should continue off driving.   8. Goal LDL for CVA prevention is less than 100 in this patient. Statin recently stopped by PCP  9. Also for CVA prevention: continue ASA started since first event and anti-HTN meds  10. He also has known mild cervical spinal stenosis by 2016 MRI C spine. EMG 4 extremity normal 2016 with Dr Lorenzo. Consider referral to pain management at next visit. No longer on opioids per PCP, for reasons, which are unclear. He reports negative UDS.   11. Primidone may have a positive effect on his insomnia. He took Pamelor and Remeron prior, with unknown effect.     FU 8 weeks.     "This note will not be shared with the patient".         "

## 2018-07-10 NOTE — PATIENT INSTRUCTIONS
To restart Primidone:     Start by taking 1/2 tablet at night for 2 weeks, then increase to 1 tablet at night afterwards, as long as you are not having any dizziness. Do not increase if you are having any dizziness. Call clinic with any dizziness after starting this medication.     Start Cymbalta 1 week after you increase the Primidone, which would be 3 weeks afterwards.

## 2018-07-19 ENCOUNTER — SURGERY (OUTPATIENT)
Age: 53
End: 2018-07-19

## 2018-07-19 ENCOUNTER — HOSPITAL ENCOUNTER (OUTPATIENT)
Facility: HOSPITAL | Age: 53
Discharge: HOME | End: 2018-07-19
Attending: COLON & RECTAL SURGERY | Admitting: COLON & RECTAL SURGERY
Payer: MEDICARE

## 2018-07-19 ENCOUNTER — ANESTHESIA (OUTPATIENT)
Dept: ENDOSCOPY | Facility: HOSPITAL | Age: 53
End: 2018-07-19
Payer: MEDICARE

## 2018-07-19 VITALS
RESPIRATION RATE: 18 BRPM | OXYGEN SATURATION: 98 % | HEART RATE: 57 BPM | SYSTOLIC BLOOD PRESSURE: 100 MMHG | DIASTOLIC BLOOD PRESSURE: 63 MMHG

## 2018-07-19 DIAGNOSIS — Z12.11 COLON CANCER SCREENING: ICD-10-CM

## 2018-07-19 PROCEDURE — 00811 ANES LWR INTST NDSC NOS: CPT | Mod: QZ,P3,PT | Performed by: NURSE ANESTHETIST, CERTIFIED REGISTERED

## 2018-07-19 PROCEDURE — 37000008 HC ANESTHESIA 1ST 15 MINUTES: Performed by: COLON & RECTAL SURGERY

## 2018-07-19 PROCEDURE — 63600175 PHARM REV CODE 636 W HCPCS: Performed by: NURSE ANESTHETIST, CERTIFIED REGISTERED

## 2018-07-19 PROCEDURE — 27201012 HC FORCEPS, HOT/COLD, DISP: Performed by: COLON & RECTAL SURGERY

## 2018-07-19 PROCEDURE — 37000009 HC ANESTHESIA EA ADD 15 MINS: Performed by: COLON & RECTAL SURGERY

## 2018-07-19 PROCEDURE — 88305 TISSUE EXAM BY PATHOLOGIST: CPT | Mod: 26,,, | Performed by: PATHOLOGY

## 2018-07-19 PROCEDURE — 45380 COLONOSCOPY AND BIOPSY: CPT | Performed by: COLON & RECTAL SURGERY

## 2018-07-19 PROCEDURE — 25000003 PHARM REV CODE 250: Performed by: COLON & RECTAL SURGERY

## 2018-07-19 PROCEDURE — 45380 COLONOSCOPY AND BIOPSY: CPT | Mod: PT,,, | Performed by: COLON & RECTAL SURGERY

## 2018-07-19 PROCEDURE — 88305 TISSUE EXAM BY PATHOLOGIST: CPT | Performed by: PATHOLOGY

## 2018-07-19 RX ORDER — PROPOFOL 10 MG/ML
INJECTION, EMULSION INTRAVENOUS CONTINUOUS PRN
Status: DISCONTINUED | OUTPATIENT
Start: 2018-07-19 | End: 2018-07-19

## 2018-07-19 RX ORDER — SODIUM CHLORIDE, SODIUM LACTATE, POTASSIUM CHLORIDE, CALCIUM CHLORIDE 600; 310; 30; 20 MG/100ML; MG/100ML; MG/100ML; MG/100ML
INJECTION, SOLUTION INTRAVENOUS CONTINUOUS
Status: DISCONTINUED | OUTPATIENT
Start: 2018-07-19 | End: 2018-07-19 | Stop reason: HOSPADM

## 2018-07-19 RX ORDER — PROPOFOL 10 MG/ML
INJECTION, EMULSION INTRAVENOUS
Status: DISCONTINUED | OUTPATIENT
Start: 2018-07-19 | End: 2018-07-19

## 2018-07-19 RX ORDER — LIDOCAINE HCL/PF 100 MG/5ML
SYRINGE (ML) INTRAVENOUS
Status: DISCONTINUED | OUTPATIENT
Start: 2018-07-19 | End: 2018-07-19

## 2018-07-19 RX ADMIN — SODIUM CHLORIDE, SODIUM LACTATE, POTASSIUM CHLORIDE, AND CALCIUM CHLORIDE: .6; .31; .03; .02 INJECTION, SOLUTION INTRAVENOUS at 11:07

## 2018-07-19 RX ADMIN — PROPOFOL 20 MG: 10 INJECTION, EMULSION INTRAVENOUS at 12:07

## 2018-07-19 RX ADMIN — PROPOFOL 80 MG: 10 INJECTION, EMULSION INTRAVENOUS at 12:07

## 2018-07-19 RX ADMIN — PROPOFOL 150 MCG/KG/MIN: 10 INJECTION, EMULSION INTRAVENOUS at 12:07

## 2018-07-19 RX ADMIN — LIDOCAINE HYDROCHLORIDE 60 MG: 20 INJECTION, SOLUTION INTRAVENOUS at 12:07

## 2018-07-19 NOTE — DISCHARGE SUMMARY
Discharge Note  Short Stay      SUMMARY     Admit Date: 7/19/2018    Attending Physician: Patric Carrera MD     Discharge Physician: Patric Carrera MD    Discharge Date: 7/19/2018 12:57 PM    Admission Diagnosis: asymptomatic screening exam    Final Diagnosis:  Colon polyp, internal hemorrhoids    Procedures Performed:    Colonoscopy polypectomy cold forceps    Disposition: Home or Self Care    Condition at Discharge:  Stable    Patient Instructions:   Current Discharge Medication List      CONTINUE these medications which have NOT CHANGED    Details   amlodipine-benazepril 5-10 mg (LOTREL) 5-10 mg per capsule Take 2 capsules by mouth once daily.       aspirin 81 MG Chew Take 162 mg by mouth once daily.       butalbital-acetaminophen-caffeine -40 mg (FIORICET, ESGIC) -40 mg per tablet Take 1 tablet by mouth 2 (two) times daily as needed for Headaches.  Qty: 60 tablet, Refills: 3    Comments: Please consider 90 day supplies to promote better adherence  Associated Diagnoses: Chronic tension-type headache, not intractable      DULoxetine (CYMBALTA) 30 MG capsule Take 1 capsule (30 mg total) by mouth once daily.  Qty: 30 capsule, Refills: 11    Associated Diagnoses: Anxiety and depression      primidone (MYSOLINE) 50 MG Tab Take 1 tablet (50 mg total) by mouth every evening. Start by taking 1/2 tablet at night for 2 weeks, then increase to 1 tablet at night afterwards.  Qty: 30 tablet, Refills: 11    Associated Diagnoses: Mixed action and resting tremor      tamsulosin (FLOMAX) 0.4 mg Cp24 Take 1 capsule (0.4 mg total) by mouth once daily.  Qty: 90 capsule, Refills: 3    Associated Diagnoses: Benign non-nodular prostatic hyperplasia without lower urinary tract symptoms         STOP taking these medications       bisacodyl (DULCOLAX) 5 mg EC tablet Comments:   Reason for Stopping:               Discharge Procedure Orders (must include Diet, Follow-up, Activity)    Discharge Procedure Orders (must include  Diet, Follow-up, Activity)  Activity as tolerated          Repeat colonoscopy 5 years.

## 2018-07-19 NOTE — H&P
Procedure : Colonoscopy    Indication(s):  asymptomatic screening exam    Review of patient's allergies indicates:   Allergen Reactions    Adhesive Rash    Latex, natural rubber Rash       Past Medical History:   Diagnosis Date    Hypercholesteremia     Hypertension     Left sided numbness     Leg weakness     Other and unspecified hyperlipidemia     Positional lightheadedness     Stroke 2014    Tremor     Weakness        Prior to Admission medications    Medication Sig Start Date End Date Taking? Authorizing Provider   amlodipine-benazepril 5-10 mg (LOTREL) 5-10 mg per capsule Take 2 capsules by mouth once daily.  6/4/18  Yes Historical Provider, MD   aspirin 81 MG Chew Take 162 mg by mouth once daily.    Yes Historical Provider, MD   butalbital-acetaminophen-caffeine -40 mg (FIORICET, ESGIC) -40 mg per tablet Take 1 tablet by mouth 2 (two) times daily as needed for Headaches. 5/23/18  Yes Maynor Benitez MD   DULoxetine (CYMBALTA) 30 MG capsule Take 1 capsule (30 mg total) by mouth once daily. 7/10/18 7/10/19 Yes Viky Chong NP   primidone (MYSOLINE) 50 MG Tab Take 1 tablet (50 mg total) by mouth every evening. Start by taking 1/2 tablet at night for 2 weeks, then increase to 1 tablet at night afterwards. 7/10/18 7/10/19 Yes Viky Chong NP   tamsulosin (FLOMAX) 0.4 mg Cp24 Take 1 capsule (0.4 mg total) by mouth once daily. 6/14/18  Yes MARGARITA Ramey   bisacodyl (DULCOLAX) 5 mg EC tablet Take 4 tablets (20 mg total) by mouth once. Per Dr. Carrera instruction sheet for 1 dose 7/18/18 7/18/18  Patric Carrera MD       Sedation Problems: NO    Family History   Problem Relation Age of Onset    Heart disease Father     Cancer Father        Fam Hx of Sedation Problems: NO    Social History     Social History    Marital status: Single     Spouse name: N/A    Number of children: N/A    Years of education: 11     Occupational History    Not on file.     Social History Main Topics     Smoking status: Never Smoker    Smokeless tobacco: Current User     Types: Snuff      Comment: 33 yr history of dip    Alcohol use No    Drug use: No    Sexual activity: Yes     Partners: Female     Birth control/ protection: None     Other Topics Concern    Not on file     Social History Narrative    No narrative on file       Review of Systems -     Respiratory ROS: no cough, shortness of breath, or wheezing  Cardiovascular ROS: no chest pain or dyspnea on exertion  Gastrointestinal ROS: no abdominal pain, change in bowel habits, or black or bloody stools  Musculoskeletal ROS: negative  Neurological ROS: positive for - weakness        Physical Exam:  General: no distress  Head: normocephalic  Airway:  normal oropharynx, airway normal  Neck: supple, symmetrical, trachea midline  Lungs:  normal respiratory effort  Heart: regular rate and rhythm  Abdomen: soft, non-tender non-distented; bowel sounds normal; no masses,  no organomegaly  Extremities: no cyanosis or edema, or clubbing       Deep Sedation: Mallampati Score per anesthesia     SedationPlan :Moderate     ASA : II    Patient is medically cleared for anesthesia.    Anesthesia/Surgery risks, benefits and alternative options discussed and understood by patient/family.

## 2018-07-19 NOTE — TRANSFER OF CARE
Anesthesia Transfer of Care Note    Patient: Jett Ballard    Procedure(s) Performed: Procedure(s) (LRB):  COLONOSCOPY (N/A)    Patient location: PACU    Anesthesia Type: general    Transport from OR: Transported from OR on 6-10 L/min O2 by face mask with adequate spontaneous ventilation    Post pain: adequate analgesia    Post assessment: no apparent anesthetic complications and tolerated procedure well    Post vital signs: stable    Level of consciousness: sedated    Nausea/Vomiting: no nausea/vomiting    Complications: none    Transfer of care protocol was followed      Last vitals: There were no vitals taken for this visit.

## 2018-07-19 NOTE — DISCHARGE INSTRUCTIONS
If sedated do not drive, smoke or drink alcohol for 10 hours  Avoid heavy lifting,straining or running for 3 days  You may not have a bowel movement for 1-3 days after procedure  You may return to normal activities the day after  You may experience some bloating and excessive gas.  This can be relieved by walking, eating lightly,or a heating pad can be applied to the abdomen.   A small amount of blood from the rectum is not serious, especially if hemorrhoids are present,  Go to ER if you notice any;   Chills and/or fever over 101   Persistent vomiting or vomiting blood   Severe abdominal pain, other gas cramp   Severe chest pain   Black tarry stools   Bright red bleeding from your rectum (greater than 1 tablespoon    If EGD, please do not eat or drink until  _________________    Call your doctor for any unusual pain,bleeding or fever.

## 2018-07-19 NOTE — ANESTHESIA PREPROCEDURE EVALUATION
07/19/2018  Jett Ballard is a 52 y.o., male.    Pre-op Assessment    I have reviewed the Patient Summary Reports.     I have reviewed the Nursing Notes.   I have reviewed the Medications.     Review of Systems  Anesthesia Hx:  No problems with previous Anesthesia  History of prior surgery of interest to airway management or planning:  Denies Personal Hx of Anesthesia complications.   Social:  Non-Smoker, No Alcohol Use    Hematology/Oncology:     Oncology Normal     Cardiovascular:   Hypertension ECG has been reviewed. 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   Endocrine:  Endocrine Normal    Psych:   Psychiatric History anxiety depression          Physical Exam  General:  Well nourished    Airway/Jaw/Neck:  Airway Findings: Mouth Opening: Normal Tongue: Large  General Airway Assessment: Adult  Mallampati: II  TM Distance: Normal, at least 6 cm      Dental:  Dental Findings:    Chest/Lungs:  Chest/Lungs Findings: Normal Respiratory Rate     Heart/Vascular:  Heart Findings: Rate: Normal        Mental Status:  Mental Status Findings:  Alert and Oriented, Cooperative         Anesthesia Plan  Type of Anesthesia, risks & benefits discussed:  Anesthesia Type:  MAC, general  Patient's Preference:   Intra-op Monitoring Plan: standard ASA monitors  Intra-op Monitoring Plan Comments:   Post Op Pain Control Plan:   Post Op Pain Control Plan Comments:   Induction:   IV  Beta Blocker:  Patient is not currently on a Beta-Blocker (No further  documentation required).       Informed Consent: Patient understands risks and agrees with Anesthesia plan.  Questions answered. Anesthesia consent signed with patient.  ASA Score: 3     Day of Surgery Review of History & Physical: I have interviewed and examined the patient. I have reviewed the patient's H&P dated: 7/19/18. There are no significant changes.  H&P update referred to the surgeon.         Ready For Surgery From Anesthesia Perspective.

## 2018-07-19 NOTE — OP NOTE
Patient Name: Jett Ballard   Procedure Date: 2018  MRN: [id number]  : 1965  Age: 52 y.o.  Gender: male   Referring Physician :  Sana Tinsley NP  Plan for Procedure: Monitored anaesthesia care  Indication: asymptomatic screening exam  Procedure:   Colonoscopy polypectomy cold forceps    Surgeon(s) and Role:     * Patric Carrera MD - Primary    Complications: None     Medicines: monitored anesthesia care    Procedure:  Prior to the procedure, a History and Physical was performed, and patient medications, allergies and sensitivities were reviewed.  The patient's tolerance of previous anesthesia was reviewed. The risks and benefits of the procedure and the sedation options and risks were discussed with the patient.  All questions were answered and informed consent was obtained.    After I obtained informed consent, the scope was passed under direct vision.  Throughout the procedure, the patient's blood pressure, pulse, and oxygen saturations were monitored continuously.  The Olympus scope CF - RV237P was introduced through the anus and advanced to the terminal ileum, identified by appendiceal orifice and ileocecal valve.  The colonoscopy was performed without difficulty.  The patient tolerated the procedure well.  The quality of the bowel preparation was good     Findings:  hemorrhoids internal, Moderate in size  polyp(s) #1, 2 mm in size, located in the ascending colon removed by cold biopsy and sent for pathology    EBL: none    Impression:  A single benign appearing colon polyp, removed as above.  Moderate internal hemorrhoids    Recommendation:  Repeat exam: 5 years  Return to my office prn

## 2018-08-13 ENCOUNTER — TELEPHONE (OUTPATIENT)
Dept: NEUROLOGY | Facility: CLINIC | Age: 53
End: 2018-08-13

## 2018-08-13 DIAGNOSIS — R25.9 MIXED ACTION AND RESTING TREMOR: Primary | ICD-10-CM

## 2018-08-13 RX ORDER — ATORVASTATIN CALCIUM 20 MG/1
20 TABLET, FILM COATED ORAL NIGHTLY
Refills: 5 | COMMUNITY
Start: 2018-07-13 | End: 2019-08-20

## 2018-08-13 NOTE — TELEPHONE ENCOUNTER
Spoke with patient he states that he stopped taking the Cymbalta he felt strange on the medication and was unsure about the side effects and past events with his mother so he stopped taking the medication. He states that he also stopped taking the Primidone as he did not like the way the medication made him feel. I got his last office visit note from  which was on 7/16/18 with an updated medication list and was able to update his medication list to what he told me he is currently taking. He states that he is wanting to get back on the Cogentin as this was the only medication that helped his tremor. Please advise.

## 2018-08-13 NOTE — TELEPHONE ENCOUNTER
----- Message from Joyce Pendleton sent at 2018  9:22 AM CDT -----  Contact: PATIENT  Jett Ballard  MRN: [id number]  : 1965  PCP: Sana Tinsley  Home Phone      [phone]  Work Phone      Not on file.  Mobile          [phone]      MESSAGE: Patient states that he has stopped all of his medications because they are not helping.  He states that the only medication that helped with his tremors was Cogentin and he is not sure why this medication was stopped.      Phone: [phone]

## 2018-08-14 RX ORDER — BENZTROPINE MESYLATE 0.5 MG/1
0.5 TABLET ORAL SEE ADMIN INSTRUCTIONS
Qty: 60 TABLET | Refills: 5 | Status: SHIPPED | OUTPATIENT
Start: 2018-08-14 | End: 2019-01-08 | Stop reason: SDUPTHER

## 2018-08-15 ENCOUNTER — TELEPHONE (OUTPATIENT)
Dept: NEUROLOGY | Facility: CLINIC | Age: 53
End: 2018-08-15

## 2018-08-15 NOTE — TELEPHONE ENCOUNTER
----- Message from Joyce Pendleton sent at 8/15/2018  3:55 PM CDT -----  Contact: PATIENT  Jett Ballard  MRN: [id number]  : 1965  PCP: Sana Tinsley  Home Phone      [phone]  Work Phone      Not on file.  Mobile          [phone]      MESSAGE: Patient would like to see if Viky could provide him any type of information about his chronic pain that he has.      Phone: [phone]

## 2018-08-15 NOTE — TELEPHONE ENCOUNTER
Patient states that it was discussed of his neck and back pain and that he should seek pain management but patient states that he does not think pain management will help. Placed on wait list for sooner appointment than scheduled, if one becomes available.

## 2018-08-15 NOTE — TELEPHONE ENCOUNTER
What pain is he referring to? This was not something that we discussed in detail at his last visit with me. I would recommend a separate visit to discuss this, so that I may answer questions that he may have. He should have an upcoming follow up visit with me.

## 2018-08-22 ENCOUNTER — TELEPHONE (OUTPATIENT)
Dept: INTERNAL MEDICINE | Facility: CLINIC | Age: 53
End: 2018-08-22

## 2018-08-22 NOTE — TELEPHONE ENCOUNTER
appt was scheduled for pt to come in on 8/28/2018@ 10:30am for 24 hour urine test for heavy metal please advise.

## 2018-08-22 NOTE — TELEPHONE ENCOUNTER
----- Message from Jennifer Eid sent at 2018 10:37 AM CDT -----  Contact: Self  Jett Ballard  MRN: [id number]  : 1965  PCP: Sana Tinsley  Home Phone      [phone]  Work Phone      Not on file.  Mobile          [phone]    MESSAGE:   Would like to know if he can get a 24 hour urine test to test for heavy metal.   This is not being requested by an employer, he has not had this done before, no other doctor is requesting this. He would just like to have this done.  Please call.    Phone:  [phone]

## 2018-08-22 NOTE — TELEPHONE ENCOUNTER
I am not sure why he wants this or what this is for. I would rather discuss the reasons before ordering. This is not a typical test we run

## 2018-08-25 ENCOUNTER — HOSPITAL ENCOUNTER (EMERGENCY)
Facility: HOSPITAL | Age: 53
Discharge: HOME | End: 2018-08-25
Attending: SURGERY
Payer: MEDICARE

## 2018-08-25 VITALS
WEIGHT: 168.75 LBS | HEART RATE: 82 BPM | DIASTOLIC BLOOD PRESSURE: 78 MMHG | RESPIRATION RATE: 18 BRPM | BODY MASS INDEX: 21.67 KG/M2 | OXYGEN SATURATION: 100 % | TEMPERATURE: 97 F | SYSTOLIC BLOOD PRESSURE: 147 MMHG
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
## 2018-08-25 PROCEDURE — 25000003 PHARM REV CODE 250: Performed by: SURGERY

## 2018-08-25 PROCEDURE — 96372 THER/PROPH/DIAG INJ SC/IM: CPT

## 2018-08-25 PROCEDURE — 99283 EMERGENCY DEPT VISIT LOW MDM: CPT | Mod: 25

## 2018-08-25 PROCEDURE — S0077 INJECTION, CLINDAMYCIN PHOSP: HCPCS | Performed by: SURGERY

## 2018-08-25 RX ORDER — CLINDAMYCIN PHOSPHATE 150 MG/ML
600 INJECTION, SOLUTION INTRAVENOUS
Status: COMPLETED | OUTPATIENT
Start: 2018-08-25 | End: 2018-08-25

## 2018-08-25 RX ORDER — MUPIROCIN 20 MG/G
OINTMENT TOPICAL 3 TIMES DAILY
Qty: 15 G | Refills: 0 | Status: SHIPPED | OUTPATIENT
Start: 2018-08-25 | End: 2018-09-04

## 2018-08-25 RX ORDER — CLINDAMYCIN HYDROCHLORIDE 300 MG/1
300 CAPSULE ORAL 4 TIMES DAILY
Qty: 28 CAPSULE | Refills: 0 | Status: SHIPPED | OUTPATIENT
Start: 2018-08-25 | End: 2018-09-01

## 2018-08-25 RX ADMIN — CLINDAMYCIN PHOSPHATE 600 MG: 150 INJECTION, SOLUTION INTRAMUSCULAR; INTRAVENOUS at 11:08

## 2018-08-25 NOTE — ED PROVIDER NOTES
[facility]                                                 Chief Complaint  52 y.o. male with Insect Bite    History of Present Illness  Jett Ballard presents to the emergency room with right foot insect bite  Patient states he has been on the right dorsal foot by an insect yesterday p.m.  Patient noticed a small insect bite on the right dorsal foot, minor pustule noted  Patient has no signs of cellulitis or cellulitic spread, no fever or systemic issue  Patient has no history of MRSA infection, temperature is 96.9° Fahrenheit now    The history is provided by the patient   device was not used during this ER visit    Past Medical History   -- Hypertension      -- Left sided numbness      -- Leg weakness      -- Other and unspecified hyperlipidemia      -- Positional lightheadedness      -- Stroke      -- Tremor      -- Weakness          Past Surgical History   -- Back surgery             Allergies   -- Adhesive      -- Latex, Natural Rubber      Review of Systems and Physical Exam      Review of Systems  -- Constitution - no fever, denies fatigue, no weakness, no chills  -- Eyes - no tearing or redness, no visual disturbance  -- Ear, Nose - no tinnitus or earache, no nasal congestion or discharge  -- Mouth,Throat - no sore throat, no toothache, normal voice, normal swallowing  -- Respiratory - denies cough and congestion, no shortness of breath, no GIRALDO  -- Cardiovascular - denies chest pain, no palpitations, denies claudication  -- Gastrointestinal - denies abdominal pain, nausea, vomiting, or diarrhea  -- Genitourinary - no dysuria, denies flank pain, no hematuria, no STD risk  -- Musculoskeletal - denies back pain, negative for myalgias and arthralgias   -- Neurological - no headache, denies weakness or seizure; no LOC  -- Skin - insect bite on the right foot yesterday    Vital Signs  His oral temperature is 96.9 °F (36.1 °C).   His blood pressure is 147/78 (abnormal)  and his pulse is 82.   His respiration is 18 and oxygen saturation is 100%.     Physical Exam  -- Nursing note and vitals reviewed  -- Constitutional: Appears well-developed and well-nourished  -- Head: Atraumatic. Normocephalic. No obvious abnormality  -- Eyes: Pupils are equal and reactive to light. Normal conjunctiva and lids  -- Cardiac: Normal rate, regular rhythm and normal heart sounds  -- Pulmonary: Normal respiratory effort, breath sounds clear to auscultation  -- Abdominal: Soft, no tenderness. Normal bowel sounds. Normal liver edge  -- Musculoskeletal: Normal range of motion, no effusions. Joints stable   -- Neurological: No focal deficits. Showed good interaction with staff  -- Skin:  Small insect bite on the right dorsal foot    Emergency Room Course      Treatment and Evaluation  --  mg Clindamycin given today in the ER    Diagnosis  -- The encounter diagnosis was Insect bite, initial encounter.    Disposition and Plan  -- Disposition: home  -- Condition: stable  -- Follow-up: Patient to follow up with Sana Tinsley NP in 1-2 days.  -- I advised the patient that we have found no life threatening condition today  -- At this time, I believe the patient is clinically stable for discharge.   -- The patient acknowledges that close follow up with a MD is required   -- Patient agrees to comply with all instruction and direction given in the ER    This note is dictated on Dragon Natural Speaking word recognition program.  There are word recognition mistakes that are occasionally missed on review.          Philippe Luis MD  08/25/18 5697

## 2018-08-28 ENCOUNTER — OFFICE VISIT (OUTPATIENT)
Dept: INTERNAL MEDICINE | Facility: CLINIC | Age: 53
End: 2018-08-28
Payer: MEDICARE

## 2018-08-28 VITALS
HEART RATE: 89 BPM | HEIGHT: 74 IN | BODY MASS INDEX: 21.58 KG/M2 | DIASTOLIC BLOOD PRESSURE: 80 MMHG | WEIGHT: 168.19 LBS | SYSTOLIC BLOOD PRESSURE: 120 MMHG | OXYGEN SATURATION: 98 %

## 2018-08-28 DIAGNOSIS — M65.351 TRIGGER LITTLE FINGER OF RIGHT HAND: ICD-10-CM

## 2018-08-28 DIAGNOSIS — G99.2 MYELOPATHY CONCURRENT WITH AND DUE TO SPINAL STENOSIS OF CERVICAL REGION: Primary | ICD-10-CM

## 2018-08-28 DIAGNOSIS — M48.02 MYELOPATHY CONCURRENT WITH AND DUE TO SPINAL STENOSIS OF CERVICAL REGION: Primary | ICD-10-CM

## 2018-08-28 PROCEDURE — 99999 PR PBB SHADOW E&M-EST. PATIENT-LVL IV: CPT | Mod: PBBFAC,,, | Performed by: NURSE PRACTITIONER

## 2018-08-28 PROCEDURE — 99999 PR STA SHADOW: CPT | Mod: PBBFAC,,, | Performed by: NURSE PRACTITIONER

## 2018-08-28 PROCEDURE — 99214 OFFICE O/P EST MOD 30 MIN: CPT | Mod: PBBFAC | Performed by: NURSE PRACTITIONER

## 2018-08-28 PROCEDURE — 99214 OFFICE O/P EST MOD 30 MIN: CPT | Mod: S$PBB | Performed by: NURSE PRACTITIONER

## 2018-08-28 NOTE — PROGRESS NOTES
Subjective:       Patient ID: Jett Ballard is a 52 y.o. male.    Chief Complaint: Establish Care (Urine Test)    HPI: Pt presents to clinic today known to me with c/o pain all over. He reports that he is not seeing anyone that is helping with his chronic pain. He reports that that since the day of his stroke he has had pain every where. He c/o pain most in base of neck down to shoulder blades and across the shoulders. He has seen Dr Rashid for this pain as well and she referred him to [facility],. He does not want to see them because he does not believe that they will help. He feels like he has had multiple gallioum scan MRI and feels like he may be having pain from this. He has done some research and that can cause chronic bone pain. He would like a heavy metal urine test for this.     CT cervical 2016    Disc protrusions are suspected at several levels as described above with central canal stenosis particularly at the C4-C5 level, C6-C7 level and C5-C6 level. MRI could better evaluate degenerative changes if necessary  Review of Systems   Constitutional: Negative for chills and fever.   HENT: Negative for congestion, postnasal drip and sore throat.    Eyes: Negative for photophobia.   Respiratory: Negative for cough, chest tightness and shortness of breath.    Cardiovascular: Negative for chest pain, palpitations and leg swelling.   Gastrointestinal: Negative for abdominal distention, abdominal pain, blood in stool and vomiting.   Genitourinary: Negative for dysuria, flank pain and hematuria.   Musculoskeletal: Positive for arthralgias and neck pain. Negative for back pain.        ROM limited   Skin: Negative for color change, pallor, rash and wound.   Neurological: Negative for dizziness, seizures, facial asymmetry, speech difficulty, weakness, light-headedness, numbness and headaches.   Hematological: Does not bruise/bleed easily.   Psychiatric/Behavioral: Negative for agitation and suicidal ideas.  The patient is not nervous/anxious.        Objective:      Physical Exam   Constitutional: He is oriented to person, place, and time. He appears well-developed and well-nourished.   HENT:   Head: Normocephalic and atraumatic.   Cardiovascular: Normal rate, regular rhythm, normal heart sounds and intact distal pulses.   No murmur heard.  Pulmonary/Chest: Effort normal and breath sounds normal. No stridor. No respiratory distress. He has no wheezes. He has no rales.   Abdominal: Soft. Bowel sounds are normal. He exhibits no distension and no mass. There is no tenderness. There is no guarding.   Musculoskeletal: He exhibits tenderness. He exhibits no edema or deformity.   Chronic neck pain right little finger restricted   Neurological: He is alert and oriented to person, place, and time. He has normal reflexes.   Skin: Skin is warm and dry. Capillary refill takes less than 2 seconds.   Nursing note and vitals reviewed.      Assessment:       1. Myelopathy concurrent with and due to spinal stenosis of cervical region    2. Trigger little finger of right hand        Plan:     Problem List Items Addressed This Visit     None      Visit Diagnoses     Myelopathy concurrent with and due to spinal stenosis of cervical region    -  Primary    Relevant Orders    Ambulatory Referral to Neurosurgery    Ambulatory Referral to Neurosurgery    Trigger little finger of right hand        Relevant Orders    Ambulatory Referral to Orthopedics

## 2018-10-17 ENCOUNTER — TELEPHONE (OUTPATIENT)
Dept: INTERNAL MEDICINE | Facility: CLINIC | Age: 53
End: 2018-10-17

## 2018-10-17 DIAGNOSIS — M48.02 CERVICAL STENOSIS OF SPINAL CANAL: Primary | ICD-10-CM

## 2018-10-17 RX ORDER — PANTOPRAZOLE SODIUM 40 MG/1
40 TABLET, DELAYED RELEASE ORAL DAILY
Qty: 30 TABLET | Refills: 11 | Status: SHIPPED | OUTPATIENT
Start: 2018-10-17 | End: 2018-12-05 | Stop reason: SDUPTHER

## 2018-10-17 NOTE — TELEPHONE ENCOUNTER
Informed patient that protonix has been ordered and referral to Dr. Villegas has been completed. Referral, demographic and insurance information faxed to Dr. Villegas' office

## 2018-10-17 NOTE — TELEPHONE ENCOUNTER
----- Message from Helen Alcaraz sent at 10/17/2018 12:54 PM CDT -----  Contact: SELF  Jett Ballard  MRN: [id number]  : 1965  PCP: Sana Tinsley  Home Phone      [phone]  Work Phone      Not on file.  Mobile          [phone]      MESSAGE: NEEDS REFILL ON PANTOPRAZOLE 40MG. ALSO, WANTS TO GET A REFERRAL TO SEE A PAIN MGMT DOCTOR. WANTS TO SEE DR PARR IN [place].     PHONE: [phone]    PHARMACY: [facility]

## 2018-10-17 NOTE — TELEPHONE ENCOUNTER
Patient requesting a refill on protonix, but it is not on active med list. He is also requesting a referral to pain management, Dr. Villegas. Please advise. Thanks.

## 2018-11-23 ENCOUNTER — TELEPHONE (OUTPATIENT)
Dept: INTERNAL MEDICINE | Facility: CLINIC | Age: 53
End: 2018-11-23

## 2018-11-23 NOTE — TELEPHONE ENCOUNTER
----- Message from Jennifer Eid sent at 2018  4:39 PM CST -----  Contact: Self  Jett Ballard  MRN: [id number]  : 1965  PCP: Sana Tinsley  Home Phone      [phone]  Work Phone      Not on file.  Mobile          [phone]    MESSAGE:   Patient complaining his mood swings are getting worse and would like to talk to nurse or Sana about possibly getting on something to help with this.  Please call to advise.      Phone: [phone]

## 2018-12-05 ENCOUNTER — HOSPITAL ENCOUNTER (EMERGENCY)
Facility: HOSPITAL | Age: 53
Discharge: HOME | End: 2018-12-06
Attending: SURGERY
Payer: MEDICARE

## 2018-12-05 DIAGNOSIS — M48.02 CERVICAL STENOSIS OF SPINAL CANAL: ICD-10-CM

## 2018-12-05 DIAGNOSIS — F41.9 ANXIETY: ICD-10-CM

## 2018-12-05 LAB
ALBUMIN SERPL BCP-MCNC: 4.7 G/DL
ALP SERPL-CCNC: 95 U/L
ALT SERPL W/O P-5'-P-CCNC: 23 U/L
ANION GAP SERPL CALC-SCNC: 13 MMOL/L
APTT BLDCRRT: 25.5 SEC
AST SERPL-CCNC: 24 U/L
BASOPHILS # BLD AUTO: 0.03 K/UL
BASOPHILS NFR BLD: 0.5 %
BILIRUB SERPL-MCNC: 0.7 MG/DL
BNP SERPL-MCNC: <10 PG/ML
BUN SERPL-MCNC: 19 MG/DL
CALCIUM SERPL-MCNC: 9.8 MG/DL
CHLORIDE SERPL-SCNC: 102 MMOL/L
CK MB SERPL-MCNC: 2 NG/ML
CK MB SERPL-MCNC: 2.2 NG/ML
CK MB SERPL-RTO: 0.9 %
CK MB SERPL-RTO: 0.9 %
CK SERPL-CCNC: 211 U/L
CK SERPL-CCNC: 211 U/L
CK SERPL-CCNC: 234 U/L
CK SERPL-CCNC: 234 U/L
CO2 SERPL-SCNC: 25 MMOL/L
CREAT SERPL-MCNC: 1 MG/DL
D DIMER PPP IA.FEU-MCNC: 0.28 MG/L FEU
DIFFERENTIAL METHOD: ABNORMAL
EOSINOPHIL # BLD AUTO: 0 K/UL
EOSINOPHIL NFR BLD: 0 %
ERYTHROCYTE [DISTWIDTH] IN BLOOD BY AUTOMATED COUNT: 12.6 %
EST. GFR  (AFRICAN AMERICAN): >60 ML/MIN/1.73 M^2
EST. GFR  (NON AFRICAN AMERICAN): >60 ML/MIN/1.73 M^2
GLUCOSE SERPL-MCNC: 85 MG/DL
HCT VFR BLD AUTO: 46 %
HGB BLD-MCNC: 15.3 G/DL
INR PPP: 1
LYMPHOCYTES # BLD AUTO: 0.7 K/UL
LYMPHOCYTES NFR BLD: 12.9 %
MCH RBC QN AUTO: 30.3 PG
MCHC RBC AUTO-ENTMCNC: 33.3 G/DL
MCV RBC AUTO: 91 FL
MONOCYTES # BLD AUTO: 0.3 K/UL
MONOCYTES NFR BLD: 6 %
NEUTROPHILS # BLD AUTO: 4.5 K/UL
NEUTROPHILS NFR BLD: 80.6 %
PLATELET # BLD AUTO: 251 K/UL
PMV BLD AUTO: 10.6 FL
POTASSIUM SERPL-SCNC: 3.4 MMOL/L
PROT SERPL-MCNC: 8.6 G/DL
PROTHROMBIN TIME: 10.4 SEC
RBC # BLD AUTO: 5.05 M/UL
SODIUM SERPL-SCNC: 140 MMOL/L
TROPONIN I SERPL DL<=0.01 NG/ML-MCNC: <0.006 NG/ML
TROPONIN I SERPL DL<=0.01 NG/ML-MCNC: <0.006 NG/ML
WBC # BLD AUTO: 5.64 K/UL

## 2018-12-05 PROCEDURE — 93010 ELECTROCARDIOGRAM REPORT: CPT | Mod: ,,, | Performed by: INTERNAL MEDICINE

## 2018-12-05 PROCEDURE — 85025 COMPLETE CBC W/AUTO DIFF WBC: CPT

## 2018-12-05 PROCEDURE — 85730 THROMBOPLASTIN TIME PARTIAL: CPT

## 2018-12-05 PROCEDURE — 82553 CREATINE MB FRACTION: CPT

## 2018-12-05 PROCEDURE — 36415 COLL VENOUS BLD VENIPUNCTURE: CPT

## 2018-12-05 PROCEDURE — 83880 ASSAY OF NATRIURETIC PEPTIDE: CPT

## 2018-12-05 PROCEDURE — 85610 PROTHROMBIN TIME: CPT

## 2018-12-05 PROCEDURE — 82550 ASSAY OF CK (CPK): CPT

## 2018-12-05 PROCEDURE — 80053 COMPREHEN METABOLIC PANEL: CPT

## 2018-12-05 PROCEDURE — 84484 ASSAY OF TROPONIN QUANT: CPT

## 2018-12-05 PROCEDURE — 85379 FIBRIN DEGRADATION QUANT: CPT

## 2018-12-05 PROCEDURE — 63600175 PHARM REV CODE 636 W HCPCS: Performed by: SURGERY

## 2018-12-05 PROCEDURE — 93005 ELECTROCARDIOGRAM TRACING: CPT

## 2018-12-05 PROCEDURE — 99285 EMERGENCY DEPT VISIT HI MDM: CPT | Mod: 25

## 2018-12-05 RX ORDER — NAPROXEN SODIUM 220 MG/1
81 TABLET, FILM COATED ORAL
Status: DISCONTINUED | OUTPATIENT
Start: 2018-12-05 | End: 2018-12-06 | Stop reason: HOSPADM

## 2018-12-05 RX ORDER — LORAZEPAM 2 MG/ML
0.5 INJECTION INTRAMUSCULAR
Status: DISCONTINUED | OUTPATIENT
Start: 2018-12-05 | End: 2018-12-06 | Stop reason: HOSPADM

## 2018-12-05 NOTE — TELEPHONE ENCOUNTER
----- Message from Fabiola Springer sent at 12/5/2018  4:24 PM CST -----  Contact: self   Pt requests medication refill for acid reflux (pt does not know the name of his medication).    Phone # [phone]    Pharmacy - [facility] [address]

## 2018-12-06 VITALS
RESPIRATION RATE: 16 BRPM | OXYGEN SATURATION: 98 % | SYSTOLIC BLOOD PRESSURE: 108 MMHG | BODY MASS INDEX: 21.57 KG/M2 | HEART RATE: 67 BPM | TEMPERATURE: 98 F | WEIGHT: 168 LBS | DIASTOLIC BLOOD PRESSURE: 68 MMHG
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
## 2018-12-06 NOTE — ED NOTES
Patient resting at present. Updated on plan of care/patient voiced understanding. Call bell in reach/patient voiced that he would call PRN. Bed locked and in lowest position, side rail up X1. Patient in no acute distress. Awaiting additional orders. Will continue to monitor/cardiac monitoring continued, NAD.

## 2018-12-10 RX ORDER — PANTOPRAZOLE SODIUM 40 MG/1
40 TABLET, DELAYED RELEASE ORAL DAILY
Qty: 30 TABLET | Refills: 11 | Status: SHIPPED | OUTPATIENT
Start: 2018-12-10 | End: 2019-01-22

## 2019-01-08 DIAGNOSIS — R25.9 MIXED ACTION AND RESTING TREMOR: ICD-10-CM

## 2019-01-08 RX ORDER — BENZTROPINE MESYLATE 0.5 MG/1
0.5 TABLET ORAL 2 TIMES DAILY
Qty: 60 TABLET | Refills: 0 | Status: SHIPPED | OUTPATIENT
Start: 2019-01-08 | End: 2019-01-22 | Stop reason: SDUPTHER

## 2019-01-22 ENCOUNTER — HOSPITAL ENCOUNTER (OUTPATIENT)
Dept: RADIOLOGY | Facility: HOSPITAL | Age: 54
Discharge: HOME | End: 2019-01-22
Attending: NURSE PRACTITIONER
Payer: MEDICARE

## 2019-01-22 ENCOUNTER — OFFICE VISIT (OUTPATIENT)
Dept: NEUROLOGY | Facility: CLINIC | Age: 54
End: 2019-01-22
Payer: MEDICARE

## 2019-01-22 VITALS
DIASTOLIC BLOOD PRESSURE: 92 MMHG | HEIGHT: 74 IN | RESPIRATION RATE: 16 BRPM | WEIGHT: 181.69 LBS | SYSTOLIC BLOOD PRESSURE: 126 MMHG | HEART RATE: 88 BPM | BODY MASS INDEX: 23.32 KG/M2

## 2019-01-22 DIAGNOSIS — N40.0 ENLARGED PROSTATE: ICD-10-CM

## 2019-01-22 DIAGNOSIS — G24.5 BLEPHAROSPASM: ICD-10-CM

## 2019-01-22 DIAGNOSIS — I10 ESSENTIAL HYPERTENSION: ICD-10-CM

## 2019-01-22 DIAGNOSIS — M48.02 CERVICAL STENOSIS OF SPINAL CANAL: Chronic | ICD-10-CM

## 2019-01-22 DIAGNOSIS — R26.89 IMPAIRMENT OF BALANCE: ICD-10-CM

## 2019-01-22 DIAGNOSIS — N48.6 PEYRONIE'S DISEASE: ICD-10-CM

## 2019-01-22 DIAGNOSIS — N48.89 PENILE PAIN: ICD-10-CM

## 2019-01-22 DIAGNOSIS — I69.90 LATE EFFECTS OF CVA (CEREBROVASCULAR ACCIDENT): ICD-10-CM

## 2019-01-22 DIAGNOSIS — G44.229 CHRONIC TENSION-TYPE HEADACHE, NOT INTRACTABLE: ICD-10-CM

## 2019-01-22 DIAGNOSIS — F41.9 ANXIETY: ICD-10-CM

## 2019-01-22 DIAGNOSIS — F41.0 PANIC ATTACKS: ICD-10-CM

## 2019-01-22 DIAGNOSIS — N88.2 CERVICAL STENOSIS (UTERINE CERVIX): ICD-10-CM

## 2019-01-22 DIAGNOSIS — R29.818 ROMBERG'S TEST POSITIVE: ICD-10-CM

## 2019-01-22 DIAGNOSIS — R25.9 MIXED ACTION AND RESTING TREMOR: ICD-10-CM

## 2019-01-22 DIAGNOSIS — M54.2 NECK PAIN: ICD-10-CM

## 2019-01-22 DIAGNOSIS — R26.89 IMPAIRMENT OF BALANCE: Primary | ICD-10-CM

## 2019-01-22 DIAGNOSIS — R45.86 MOOD SWINGS: ICD-10-CM

## 2019-01-22 DIAGNOSIS — K21.9 GASTROESOPHAGEAL REFLUX DISEASE WITHOUT ESOPHAGITIS: ICD-10-CM

## 2019-01-22 DIAGNOSIS — R42 VERTIGO: ICD-10-CM

## 2019-01-22 DIAGNOSIS — G47.00 INSOMNIA, UNSPECIFIED TYPE: ICD-10-CM

## 2019-01-22 DIAGNOSIS — M54.2 NECK PAIN: Primary | ICD-10-CM

## 2019-01-22 DIAGNOSIS — E78.2 MIXED HYPERLIPIDEMIA: ICD-10-CM

## 2019-01-22 DIAGNOSIS — R26.89 IMBALANCE: ICD-10-CM

## 2019-01-22 DIAGNOSIS — R41.3 MEMORY LOSS: ICD-10-CM

## 2019-01-22 PROBLEM — R53.83 FATIGUE: Status: RESOLVED | Noted: 2018-06-05 | Resolved: 2019-01-22

## 2019-01-22 PROBLEM — Z12.11 COLON CANCER SCREENING: Status: RESOLVED | Noted: 2018-07-19 | Resolved: 2019-01-22

## 2019-01-22 PROCEDURE — 70551 MRI BRAIN WITHOUT CONTRAST: ICD-10-PCS | Mod: 26,,, | Performed by: RADIOLOGY

## 2019-01-22 PROCEDURE — 70551 MRI BRAIN STEM W/O DYE: CPT | Mod: TC

## 2019-01-22 PROCEDURE — 99999 PR STA SHADOW: CPT | Mod: PBBFAC,,, | Performed by: NURSE PRACTITIONER

## 2019-01-22 PROCEDURE — 99215 OFFICE O/P EST HI 40 MIN: CPT | Mod: S$PBB | Performed by: NURSE PRACTITIONER

## 2019-01-22 PROCEDURE — 99215 OFFICE O/P EST HI 40 MIN: CPT | Mod: PBBFAC | Performed by: NURSE PRACTITIONER

## 2019-01-22 PROCEDURE — 70551 MRI BRAIN STEM W/O DYE: CPT | Mod: 26,,, | Performed by: RADIOLOGY

## 2019-01-22 PROCEDURE — 99999 PR PBB SHADOW E&M-EST. PATIENT-LVL V: CPT | Mod: PBBFAC,,, | Performed by: NURSE PRACTITIONER

## 2019-01-22 PROCEDURE — 99999 PR PBB SHADOW E&M-EST. PATIENT-LVL V: ICD-10-PCS | Mod: PBBFAC,,, | Performed by: NURSE PRACTITIONER

## 2019-01-22 RX ORDER — BENZTROPINE MESYLATE 0.5 MG/1
0.5 TABLET ORAL 2 TIMES DAILY
Qty: 60 TABLET | Refills: 5 | Status: SHIPPED | OUTPATIENT
Start: 2019-01-22 | End: 2019-05-14

## 2019-01-22 RX ORDER — PRIMIDONE 50 MG/1
50 TABLET ORAL NIGHTLY
Qty: 30 TABLET | Refills: 2 | Status: SHIPPED | OUTPATIENT
Start: 2019-01-22 | End: 2019-03-14 | Stop reason: ALTCHOICE

## 2019-01-22 NOTE — PROGRESS NOTES
"HPI: Jett Ballard is a 53 y.o. male with PMHx of Right thalamic CVA (5/15) and was previously followed by an outside neurologist, Dr Lorenzo for tremors and memory loss since then. He had a spell of slurred speech and bilateral weakness/facial tingling in 5/2018. Orthostatic complaints and syncope prior, which resolved with medication adjustments per Cardiology. Admit to [facility] in 6/2018 for headache, followed by unwitnessed syncope, memory loss, and questionable facial droop per partner. Head CT unremarkable; polypharmacy was believed to contribute to some of his complaints. He has HTN, HLD, and cervical stenosis.     Patient presents today to reestablish care. He was last seen in 7/2018 for complains of hand tremors, worsening memory, and worsening anxiety. Cymbalta was started for his anxiety, which he stopped, due to side effects. Primidone was restarted for his hand tremors, which he stopped, due to side effects. Both medications were started at the same time, rather than weeks apart. He called clinic in 8/2018 to report that he stopped both Cymbalta and Primidone, due to side effects, and requested to restart Cogentin, which was prescribed at that time. He did not follow up afterwards.     His hand tremors improved for a few months after restarting Cogentin; however, hand tremors have been worse around the same time that his anxiety became worse a few months ago. He has mood swings as well. He is not currently seeing a Psychiatrist.     He complains of feeling off balance since he woke up this past Sunday. He is veering toward the right when he walks, and feels "off". Denies dizziness or room spinning sensation.     He feels as if his eyes "want to close" at times, with new onset of lower lid blepharospasm on the left.     His spinal pain is worse since his PCP stopped his Gabapentin. He is unsure why this was stopped. He took opioids per PCP prior as well.     He has had memory loss since his CVA, but " this is worse with stress. Neuropsych testing was ordered at his last visit, but was not completed, due to cost issues. Memory overall unchanged since last visit.     Headaches occur rarely.     He is requesting a Urology referral for penile pain from Peyronie's disease and a history of prostate enlargement.     He continues to report dissatisfaction with multiple providers, as well as new providers since his last visit with me.     Review of Systems   Constitutional: Negative for fever.   HENT: Negative for nosebleeds.    Eyes: Negative for double vision.   Respiratory: Negative for hemoptysis.    Cardiovascular: Negative for leg swelling.   Gastrointestinal: Negative for blood in stool.   Genitourinary: Negative for hematuria.        Penile pain   Musculoskeletal: Positive for neck pain. Negative for falls.   Skin: Negative for rash.   Neurological: Positive for tremors, sensory change and headaches. Negative for dizziness, tingling, speech change and loss of consciousness.   Psychiatric/Behavioral: Positive for depression and memory loss. The patient is nervous/anxious and has insomnia.        I have reviewed all of this patient's past medical and surgical histories as well as family and social histories and active allergies and medications as documented in the electronic medical record.    Exam:  Gen Appearance, well developed/nourished in no apparent distress  CV: 2+ distal pulses with no edema or swelling  Neuro:  MS: Awake, alert, Sustains attention but mildly slurred speech since CVA is noted. Recent/remote memory intact, Language is full to spontaneous speech/comprehension. Fund of Knowledge is full  CN: Optic discs are flat with normal vasculature, PERRL, Extraoccular movements and visual fields are full; vertical nystagmus today, as well as fast beat horizontal nystagmus to the left. Observed left lower lid blepharospasm today. No inducible diplopia or ptosis today.  Normal facial sensation and strength,  Hearing symmetric, Tongue and Palate are midline and strong. Shoulder Shrug symmetric and strong.  Motor: Normal bulk, tone, no abnormal movements at rest, but tremor of both hands with activity and movement. 5/5 strength bilateral upper/lower extremities with 2+ reflexes and no clonus  Right fifth finger is contracted.   Sensory: symmetric to light touch, pain, temp, and vibration except decreased to temp on the left side chronically.  Romberg positive.   Cerebellar: Finger-nose,Heal-shin, Rapid alternating movements intact  Gait: Normal stance-using cane due to mild left hemiparesis from CVA    Imagin2018 MRI Brain:   COMPARISON:  2018    FINDINGS:  There is no evidence for acute intracranial hemorrhage or sulcal effacement.  The ventricles are normal in size without hydrocephalus.  There is no midline shift or mass effect.  Visualized paranasal sinuses and mastoid air cells are clear.   Impression       Unremarkable noncontrast CT head specifically without evidence for acute intracranial hemorrhage.  Clinical correlation and further evaluation as warranted.       2018 MRI Brain:  COMPARISON:  2018    FINDINGS:  Intracranial compartment:    Ventricles and sulci are normal in size for age without evidence of hydrocephalus. No extra-axial blood or fluid collections.    There are few scattered foci of T2/FLAIR signal abnormality in the supratentorial white matter in keeping with chronic microvascular ischemic disease of a mild degree.  No mass lesion, acute hemorrhage, edema or acute infarct. No abnormal enhancement.    Normal vascular flow voids are preserved.    Skull/extracranial contents (limited evaluation): Bone marrow signal intensity is normal.   Impression       Age-appropriate generalized cerebral volume loss with mild chronic microvascular ischemic change.  No evidence for an acute infarction or enhancing lesion.     3/2017 CT head: No acute intracranial process.   No significant  change.    2/2017 MRI brain:   1.  No MRI evidence of an acute intracranial abnormality.  2.  Minimal stable early small vessel ischemic changes.    4/2016 MRI Brain: N no evidence of acute infarction.  No appreciable change as compared to the MRI 06/10/2015 with several small nonspecific T2 hyperintensities evident in the white matter which may reflect small nonspecific areas of gliosis or perhaps very mild microvascular ischemic change including subtle somewhat linear appearing T2 hyperintense intensity adjacent to head and body of caudate on the left.    MRI brain 2015: No evidence of acute infarction.      Small nonspecific area of T2 hyperintensity adjacent to left head of caudate may reflect nonspecific area of gliosis with additional punctate focus nonspecific T2 hyperintensity in the left parietal subcortical white matter    2015 MRA brain: Punctate acute/subacute infarction involving the right thalamus without associated hemorrhage, mass-effect or midline shift.    Age-appropriate generalized cerebral volume loss with mild degree of chronic microvascular ischemic disease    2015 CTA head: Temporal evolution of the previously noted punctate right thalamic infarction with minimal hypodensity now visualized in this location.  Otherwise, unremarkable  CT of the head specifically without evidence for acute hemorrhage or abnormal parenchymal   enhancement.    Unremarkable CTA of the head specifically without evidence for focal stenosis or intracranial aneurysm.    5/2015 Carotid US: #1. Findings consistent with less than 50% stenosis in the Right carotid artery bulb.    #2. Findings consistent with less than 50% stenosis in the Left carotid artery bulb.    11/2016 CT C-spine:      Narrative     Axial images were obtained through the cervical spine with coronal and sagittal reformats. MRI is more sensitive and specific for degenerative changes if desired MR could be performed.    Densities noted within each  external auditory canal suggestive of cerumen.    No obvious fracture or dislocation is noted.    Several normal size cervical nodes appear to be present bilaterally.    Vertebral body alignment appears adequate. Vertebral body heights appear well-preserved. Minimal intervertebral disc height narrowing is noted at the C4-C5 C5-C6 and C6-C7 levels    At the C2-C3 level, uncovertebral spurring is noted bilaterally. Mild right neural foraminal narrowing is noted without significant left neural foraminal narrowing and no significant central canal stenosis.    At the C3-C4 level, uncovertebral spurring appears to be present left greater than right. Mild left neural foraminal narrowing is noted without significant right neural foraminal stenosis and with minimal central canal narrowing    At the C4-C5 level, broad-based disc osteophyte protrusion appears to be present of 4 mm with moderate central canal stenosis to 6 mm in the anterior cord contact suspected. Moderate right neural foraminal stenosis is noted with probable nerve root contact. Mild left neural foraminal stenosis is noted. Facet arthropathy appears to be present bilaterally    At the C5-C6 level, disc osteophyte protrusion is suspected paracentral to the left of 4 mm. Beam hardening hinders ideal evaluation. Moderate central canal stenosis is suspected to 7 mm with anterior cord contact suspected. Spurring is noted towards the left neural foramen with moderate left neural foraminal stenosis and probable contact of the exiting nerve root. Mild right neural foraminal stenosis is noted. Facet arthropathy appears to be present bilaterally.    At the C6-C7 level, disc protrusion appears to be present paracentric to the left of 5 mm. Moderate to severe central canal narrowing is suspected but this  is difficult to measure secondary to beam hardening. MRI may be of use. Moderate left neural foraminal stenosis is suspected with mild right-sided neural foraminal  stenosis.    At the C7-T1 level, beam hardening hinders the evaluation without significant central canal stenosis or neural foraminal stenosis.      Impression         1. No obvious fracture or dislocation is noted.    2. Disc protrusions are suspected at several levels as described above with central canal stenosis particularly at the C4-C5 level, C6-C7 level and C5-C6 level. MRI could better evaluate degenerative changes if necessary     9/2015 EEG: IMPRESSION:   This is a normal awake and drowsy EEG. It is important to note that patients with epilepsy may have a normal EEG. Clinical correlation is therefore necessary.    EMG 2016:   This is a normal study of bilateral upper and lower extremities without significant evidence of neuropathy or radiculopathy.    2018 CT head:   Unremarkable noncontrast CT head specifically without evidence for acute intracranial hemorrhage. Further evaluation as warrented clinically.    2.2017 MRI brain:   1.  No MRI evidence of an acute intracranial abnormality.  2.  Minimal stable early small vessel ischemic changes.    5/2018 CT head:   No acute intracranial abnormality.    4/2016 MRI C spine:   The study is significantly degraded by motion artifact with bulging of the C4-5 disc suspected resulting in effacement of ventral subarachnoid space and with mild/moderate flattening of the ventral cord and overall mild degree of canal stenosis.  Small left paracentral protrusion of the C6-7 disc noted with effacement of ventral subarachnoid space and question of mild flattening of the left ventral cord a low with mild bulging of the C5-6 disc suspected.    Xray right finger noted (subluxation deformity)    5/2018 labs reviewed/ UDS negative    Assessment/Plan: Jett Ballard is a 53 y.o. male with PMHx of Right thalamic CVA (5/15) and was previously followed by an outside neurologist, Dr Lorenzo for tremors and memory loss since then. He had a spell of slurred speech and bilateral  weakness/facial tingling in 5/2018. Orthostatic complaints and syncope prior, which resolved with medication adjustments per Cardiology. Admit to [facility] in 6/2018 for headache, followed by unwitnessed syncope, memory loss, and questionable facial droop per partner. Head CT unremarkable; polypharmacy was believed to contribute to some of his complaints. He has HTN, HLD, and cervical stenosis.     I recommend:  1. MRI Brain to rule out CVA, given new onset gait imbalance. There is vertical nystagmus, which is seen with central causes; however, he has fast beat horizontal nystagmus as well today, which is seen with peripheral causes. If unremarkable, refer to [facility] for gait imbalance. Note moderate to severe cervical stenosis, which could be contributing. Consider repeat C-spine imaging if appropriate. Could also consider ENT referral if needed at any point. Note + Nystagmus today.  2. CBC, CMP, TSH, T4, folate, B12, as well as MG panel, given left blepharospasm.   3. Refer to Psychiatry for severe anxiety, panic attacks, and mood swings. Could not tolerate Cymbalta prior, due to side effects. Zoloft worsened his mood prior, and unknown response to Lexapro. Depakote is listed on his medication history, though it is unknown who prescribed this to him and for what reason. Will defer management of his insomnia to Psychiatry, as well. He took Pamelor and Remeron prior for insomnia, with unknown effect. He prefers not to take Valium prn, as prescribed by other providers. Note numerous ED visits for anxiety. There may be an element of borderline personality, as splitting is noted.    4. Tremors not controlled with Geodon lately. I suspect increased anxiety to be contributing. Tremor may improve, with improvement to his anxiety.   5. Datscan ordered prior to evaluate for PD; however, he was unable to afford this, due to 2K cost.   6. Continue Geodon as tremor improved somewhat with this, but add back Primidone.  "Difficulty to discern if the SE that he experienced prior were from Cymbalta or Primidone, as they were started simultaneously per patient.   7. Musculoskeletal complaints increased since stopping Gabapentin, which was stopped by PCP, over concern of side effects. This may have also had a positive effect on his tremors.   8. Refer to Urology per patient request for enlarged prostate, penile pain with history of Peyronie's disease.   9. Suspect memory loss to be more related to anxiety and depression, than anything else, but he does have a history of CVA. Could not afford Neuropsych testing ordered in 2018.   10. Advised to change positions slowly to reduce orthostatic complaints. Improved since med adjustments per Cardiology. Never completed EEG per PCP, due to cost concerns; however, I do not suspect his episodes of dizziness to be seizure related.   11. He uses Fioricet PRN for headache - currently improved  12. Patient does not drive since CVA- should continue off driving.   13. Goal LDL for CVA prevention is less than 100 in this patient. Statin stopped in 2018 by PCP, then restarted.   14. Also for CVA prevention: continue ASA started since first event and anti-HTN meds  15. Encouraged to comply with follow up visits with this clinic.     FU 6 weeks.     "This note will not be shared with the patient".         "

## 2019-01-23 ENCOUNTER — TELEPHONE (OUTPATIENT)
Dept: INTERNAL MEDICINE | Facility: CLINIC | Age: 54
End: 2019-01-23

## 2019-01-23 NOTE — TELEPHONE ENCOUNTER
Patient states that he is off balance, stumbling, close to falling. I told him that is what you are sending him to PT for. He will await lab results.

## 2019-01-23 NOTE — TELEPHONE ENCOUNTER
Labs are not yet complete. There are some labs that may take until next week to come in.     What symptoms is he referring to? Several items were discussed during his visit with me.

## 2019-01-23 NOTE — TELEPHONE ENCOUNTER
MESSAGE:   Patient would like to establish care with Dr. Rock. He currently sees Alvina at . He has medicare. Please call to schedule if accepted.     Herbert  [phone]

## 2019-01-23 NOTE — TELEPHONE ENCOUNTER
Pt contacted clinic wanting results of lab. Also wanted to let Viky know he is having the same symptoms as yesterday. Please advise, thank you.

## 2019-01-23 NOTE — TELEPHONE ENCOUNTER
----- Message from Rea Amin sent at 2019  2:34 PM CST -----  Contact: Self  Jett Ballard  MRN: [id number]  : 1965  PCP: Sana Tinsley  Home Phone      [phone]  Work Phone      Not on file.  Mobile          [phone]      MESSAGE:   Patient would like to establish care with Dr. Rock. He currently sees Alvina at . He has medicare. Please call to schedule if accepted.    Herbert  [phone]

## 2019-03-13 ENCOUNTER — HOSPITAL ENCOUNTER (OUTPATIENT)
Dept: RADIOLOGY | Facility: HOSPITAL | Age: 54
Discharge: HOME | End: 2019-03-13
Attending: ORTHOPAEDIC SURGERY
Payer: MEDICARE

## 2019-03-13 DIAGNOSIS — M79.641 RIGHT HAND PAIN: ICD-10-CM

## 2019-03-13 PROCEDURE — 73130 X-RAY EXAM OF HAND: CPT | Mod: TC,RT

## 2019-03-13 PROCEDURE — 73130 XR HAND COMPLETE 3 VIEW RIGHT: ICD-10-PCS | Mod: 26,RT,, | Performed by: RADIOLOGY

## 2019-03-13 PROCEDURE — 73130 X-RAY EXAM OF HAND: CPT | Mod: 26,RT,, | Performed by: RADIOLOGY

## 2019-03-14 ENCOUNTER — OFFICE VISIT (OUTPATIENT)
Dept: NEUROLOGY | Facility: CLINIC | Age: 54
End: 2019-03-14
Payer: MEDICARE

## 2019-03-14 VITALS
BODY MASS INDEX: 23.91 KG/M2 | HEART RATE: 72 BPM | RESPIRATION RATE: 18 BRPM | HEIGHT: 74 IN | WEIGHT: 186.31 LBS | SYSTOLIC BLOOD PRESSURE: 116 MMHG | DIASTOLIC BLOOD PRESSURE: 78 MMHG

## 2019-03-14 DIAGNOSIS — M54.2 NECK PAIN: ICD-10-CM

## 2019-03-14 DIAGNOSIS — R25.1 TREMOR: Primary | ICD-10-CM

## 2019-03-14 DIAGNOSIS — I69.90 LATE EFFECTS OF CVA (CEREBROVASCULAR ACCIDENT): ICD-10-CM

## 2019-03-14 DIAGNOSIS — I10 ESSENTIAL HYPERTENSION: ICD-10-CM

## 2019-03-14 DIAGNOSIS — R26.9 GAIT DIFFICULTY: ICD-10-CM

## 2019-03-14 DIAGNOSIS — R41.3 MEMORY LOSS: ICD-10-CM

## 2019-03-14 DIAGNOSIS — K21.9 GASTROESOPHAGEAL REFLUX DISEASE WITHOUT ESOPHAGITIS: ICD-10-CM

## 2019-03-14 DIAGNOSIS — R26.89 IMPAIRMENT OF BALANCE: ICD-10-CM

## 2019-03-14 DIAGNOSIS — G47.00 INSOMNIA, UNSPECIFIED TYPE: ICD-10-CM

## 2019-03-14 DIAGNOSIS — M48.02 CERVICAL STENOSIS OF SPINAL CANAL: Chronic | ICD-10-CM

## 2019-03-14 DIAGNOSIS — F41.9 ANXIETY AND DEPRESSION: ICD-10-CM

## 2019-03-14 DIAGNOSIS — E78.2 MIXED HYPERLIPIDEMIA: ICD-10-CM

## 2019-03-14 DIAGNOSIS — R25.9 MIXED ACTION AND RESTING TREMOR: ICD-10-CM

## 2019-03-14 DIAGNOSIS — R29.898 LEFT LEG WEAKNESS: ICD-10-CM

## 2019-03-14 DIAGNOSIS — F32.A ANXIETY AND DEPRESSION: ICD-10-CM

## 2019-03-14 DIAGNOSIS — G44.229 CHRONIC TENSION-TYPE HEADACHE, NOT INTRACTABLE: ICD-10-CM

## 2019-03-14 PROBLEM — R55 SYNCOPE: Status: RESOLVED | Noted: 2018-06-06 | Resolved: 2019-03-14

## 2019-03-14 PROCEDURE — 99999 PR PBB SHADOW E&M-EST. PATIENT-LVL III: CPT | Mod: PBBFAC,,, | Performed by: NURSE PRACTITIONER

## 2019-03-14 PROCEDURE — 99214 OFFICE O/P EST MOD 30 MIN: CPT | Mod: S$PBB | Performed by: NURSE PRACTITIONER

## 2019-03-14 PROCEDURE — 99999 PR STA SHADOW: ICD-10-PCS | Mod: PBBFAC,,, | Performed by: NURSE PRACTITIONER

## 2019-03-14 PROCEDURE — 99999 PR STA SHADOW: CPT | Mod: PBBFAC,,, | Performed by: NURSE PRACTITIONER

## 2019-03-14 PROCEDURE — 99213 OFFICE O/P EST LOW 20 MIN: CPT | Mod: PBBFAC | Performed by: NURSE PRACTITIONER

## 2019-03-14 RX ORDER — GABAPENTIN 100 MG/1
100 CAPSULE ORAL 3 TIMES DAILY
Qty: 90 CAPSULE | Refills: 2 | Status: SHIPPED | OUTPATIENT
Start: 2019-03-14 | End: 2019-05-14

## 2019-03-14 NOTE — PROGRESS NOTES
"HPI: Jett Ballard is a 53 y.o. male with PMHx of Right thalamic CVA (5/15) and was previously followed by an outside neurologist, Dr Lorenzo for tremors and memory loss since then. He had a spell of slurred speech and bilateral weakness/facial tingling in 5/2018. Orthostatic complaints and syncope prior, which resolved with medication adjustments per Cardiology. Admit to [facility] in 6/2018 for headache, followed by unwitnessed syncope, memory loss, and questionable facial droop per partner. Head CT unremarkable; polypharmacy was believed to contribute to some of his complaints. He has HTN, HLD, GERD, and cervical stenosis.     Patient presents today for a follow up visit. An MRI Brain was ordered at his last visit in 1/2019 to evaluate complaint of vertigo and gait imbalance; imaging overall unremarkable, and PT was ordered for his complaints. He was unable to attend PT more than one day, due to cost issues. Dizziness is improved since his last visit.     MG panel done at last visit, given report that "his eyes wanted to close"; this was unremarkable. All other labs were unremarkable as well. No further blepharospasm complaint.     He also complained of worsening hand tremors, worsening memory, and worsening anxiety at his last visit, and he was referred to Psychiatry for further management, but had to reschedule. He plans to continue with this appointment.     While worsening hand tremors were suspected to be related to anxiety, Primidone was added back, which has not been effective; however, he continues with a great deal of anxiety. His girlfriend was admitted to ICU last month and was on full life support, which has been very stressful.     He is planning to see an orthopedist for the trigger finger to the 5th right finger.     Headaches occur rarely.   Review of Systems   Constitutional: Negative for fever.   HENT: Negative for nosebleeds.    Eyes: Negative for double vision.   Respiratory: Negative for " hemoptysis.    Cardiovascular: Negative for leg swelling.   Gastrointestinal: Negative for blood in stool.   Genitourinary: Negative for hematuria.        Penile pain   Musculoskeletal: Positive for neck pain. Negative for falls.   Skin: Negative for rash.   Neurological: Positive for tremors, sensory change and headaches. Negative for dizziness, tingling, speech change and loss of consciousness.   Psychiatric/Behavioral: Positive for depression and memory loss. The patient is nervous/anxious and has insomnia.        I have reviewed all of this patient's past medical and surgical histories as well as family and social histories and active allergies and medications as documented in the electronic medical record.    Exam:  Gen Appearance, well developed/nourished in no apparent distress  CV: 2+ distal pulses with no edema or swelling  Neuro:  MS: Awake, alert, Sustains attention but mildly slurred speech since CVA is noted. Recent/remote memory intact, Language is full to spontaneous speech/comprehension. Fund of Knowledge is full  CN: Optic discs are flat with normal vasculature, PERRL, Extraoccular movements and visual fields are full; vertical nystagmus today, as well as fast beat horizontal nystagmus to the left. Observed left lower lid blepharospasm today. No inducible diplopia or ptosis today.  Normal facial sensation and strength, Hearing symmetric, Tongue and Palate are midline and strong. Shoulder Shrug symmetric and strong.  Motor: Normal bulk, tone, no abnormal movements at rest, but tremor of both hands, L>R, with activity and movement. 5/5 strength bilateral upper/lower extremities with 2+ reflexes and no clonus  Right fifth finger is contracted.   Sensory: symmetric to light touch, pain, temp, and vibration except decreased to temp on the left side chronically.  Romberg positive.   Cerebellar: Finger-nose,Heal-shin, Rapid alternating movements intact  Gait: Normal stance-using cane due to mild left  hemiparesis from CVA  MSK survey: trigger finger right 5th digit    Imagin2019 MRI Brain:   Age-appropriate generalized cerebral volume loss.  No abnormal diffusion restriction to suggest an acute infarction.  No abnormal gradient susceptibility is identified.  Few scattered foci of T2/FLAIR signal abnormality in the supratentorial white matter in keeping with chronic microvascular ischemic disease of a mild degree.  The ventricles are stable in size and configuration without hydrocephalus.  No extra-axial fluid collections.  Expected intracranial flow voids are demonstrated.  The visualized paranasal sinuses including the mastoid air cells are clear.      Impression       Age-appropriate generalized cerebral volume loss with mild chronic microvascular ischemic change.  No evidence for an acute infarction.       2018 MRI Brain:   COMPARISON:  2018    FINDINGS:  There is no evidence for acute intracranial hemorrhage or sulcal effacement.  The ventricles are normal in size without hydrocephalus.  There is no midline shift or mass effect.  Visualized paranasal sinuses and mastoid air cells are clear.   Impression       Unremarkable noncontrast CT head specifically without evidence for acute intracranial hemorrhage.  Clinical correlation and further evaluation as warranted.       2018 MRI Brain:  COMPARISON:  2018    FINDINGS:  Intracranial compartment:    Ventricles and sulci are normal in size for age without evidence of hydrocephalus. No extra-axial blood or fluid collections.    There are few scattered foci of T2/FLAIR signal abnormality in the supratentorial white matter in keeping with chronic microvascular ischemic disease of a mild degree.  No mass lesion, acute hemorrhage, edema or acute infarct. No abnormal enhancement.    Normal vascular flow voids are preserved.    Skull/extracranial contents (limited evaluation): Bone marrow signal intensity is normal.   Impression        Age-appropriate generalized cerebral volume loss with mild chronic microvascular ischemic change.  No evidence for an acute infarction or enhancing lesion.     3/2017 CT head: No acute intracranial process.   No significant change.    2/2017 MRI brain:   1.  No MRI evidence of an acute intracranial abnormality.  2.  Minimal stable early small vessel ischemic changes.    4/2016 MRI Brain: N no evidence of acute infarction.  No appreciable change as compared to the MRI 06/10/2015 with several small nonspecific T2 hyperintensities evident in the white matter which may reflect small nonspecific areas of gliosis or perhaps very mild microvascular ischemic change including subtle somewhat linear appearing T2 hyperintense intensity adjacent to head and body of caudate on the left.    MRI brain 2015: No evidence of acute infarction.      Small nonspecific area of T2 hyperintensity adjacent to left head of caudate may reflect nonspecific area of gliosis with additional punctate focus nonspecific T2 hyperintensity in the left parietal subcortical white matter    2015 MRA brain: Punctate acute/subacute infarction involving the right thalamus without associated hemorrhage, mass-effect or midline shift.    Age-appropriate generalized cerebral volume loss with mild degree of chronic microvascular ischemic disease    2015 CTA head:   Temporal evolution of the previously noted punctate right thalamic infarction with minimal hypodensity now visualized in this location.  Otherwise, unremarkable  CT of the head specifically without evidence for acute hemorrhage or abnormal parenchymal   enhancement.    Unremarkable CTA of the head specifically without evidence for focal stenosis or intracranial aneurysm.    5/2015 Carotid US:   #1. Findings consistent with less than 50% stenosis in the Right carotid artery bulb.    #2. Findings consistent with less than 50% stenosis in the Left carotid artery bulb.    11/2016 CT C-spine:       Narrative     Axial images were obtained through the cervical spine with coronal and sagittal reformats. MRI is more sensitive and specific for degenerative changes if desired MR could be performed.    Densities noted within each external auditory canal suggestive of cerumen.    No obvious fracture or dislocation is noted.    Several normal size cervical nodes appear to be present bilaterally.    Vertebral body alignment appears adequate. Vertebral body heights appear well-preserved. Minimal intervertebral disc height narrowing is noted at the C4-C5 C5-C6 and C6-C7 levels    At the C2-C3 level, uncovertebral spurring is noted bilaterally. Mild right neural foraminal narrowing is noted without significant left neural foraminal narrowing and no significant central canal stenosis.    At the C3-C4 level, uncovertebral spurring appears to be present left greater than right. Mild left neural foraminal narrowing is noted without significant right neural foraminal stenosis and with minimal central canal narrowing    At the C4-C5 level, broad-based disc osteophyte protrusion appears to be present of 4 mm with moderate central canal stenosis to 6 mm in the anterior cord contact suspected. Moderate right neural foraminal stenosis is noted with probable nerve root contact. Mild left neural foraminal stenosis is noted. Facet arthropathy appears to be present bilaterally    At the C5-C6 level, disc osteophyte protrusion is suspected paracentral to the left of 4 mm. Beam hardening hinders ideal evaluation. Moderate central canal stenosis is suspected to 7 mm with anterior cord contact suspected. Spurring is noted towards the left neural foramen with moderate left neural foraminal stenosis and probable contact of the exiting nerve root. Mild right neural foraminal stenosis is noted. Facet arthropathy appears to be present bilaterally.    At the C6-C7 level, disc protrusion appears to be present paracentric to the left of 5 mm.  Moderate to severe central canal narrowing is suspected but this  is difficult to measure secondary to beam hardening. MRI may be of use. Moderate left neural foraminal stenosis is suspected with mild right-sided neural foraminal stenosis.    At the C7-T1 level, beam hardening hinders the evaluation without significant central canal stenosis or neural foraminal stenosis.      Impression         1. No obvious fracture or dislocation is noted.    2. Disc protrusions are suspected at several levels as described above with central canal stenosis particularly at the C4-C5 level, C6-C7 level and C5-C6 level. MRI could better evaluate degenerative changes if necessary     9/2015 EEG: IMPRESSION:   This is a normal awake and drowsy EEG. It is important to note that patients with epilepsy may have a normal EEG. Clinical correlation is therefore necessary.    EMG 2016:   This is a normal study of bilateral upper and lower extremities without significant evidence of neuropathy or radiculopathy.    2018 CT head:   Unremarkable noncontrast CT head specifically without evidence for acute intracranial hemorrhage. Further evaluation as warrented clinically.    2.2017 MRI brain:   1.  No MRI evidence of an acute intracranial abnormality.  2.  Minimal stable early small vessel ischemic changes.    5/2018 CT head:   No acute intracranial abnormality.    4/2016 MRI C spine:   The study is significantly degraded by motion artifact with bulging of the C4-5 disc suspected resulting in effacement of ventral subarachnoid space and with mild/moderate flattening of the ventral cord and overall mild degree of canal stenosis.  Small left paracentral protrusion of the C6-7 disc noted with effacement of ventral subarachnoid space and question of mild flattening of the left ventral cord a low with mild bulging of the C5-6 disc suspected.    Xray right finger noted (subluxation deformity)    Labs:   5/2018 labs reviewed/ UDS negative  1/2019 MG  panel negative, CBC, CMP, TSH, T4, folate, B12 all unremarkable    Assessment/Plan: Jett Ballard is a 53 y.o. male with PMHx of Right thalamic CVA (5/15) and was previously followed by an outside neurologist, Dr Lorenzo for tremors and memory loss since then. He had a spell of slurred speech and bilateral weakness/facial tingling in 5/2018. Orthostatic complaints and syncope prior, which resolved with medication adjustments per Cardiology. Admit to [facility] in 6/2018 for headache, followed by unwitnessed syncope, memory loss, and questionable facial droop per partner. Head CT unremarkable; polypharmacy was believed to contribute to some of his complaints. He has HTN, HLD, and cervical stenosis. Dizziness and gait imbalance in 1/2019 with unremarkable MRI Brain.     I recommend:  1. Restart Gabapentin for cervical complaints, as well as his ongoing tremor, which failed to respond to Primidone when added to Cogentin. Stop Primidone and continue Cogentin.   2. Ongoing tremor is likely aggravated by his severe anxiety; however, Gabapentin can have a mood stabilizing effect.   3. He is scheduled to see Psychiatry for his severe anxiety, which is likely causing his memory loss. Could not tolerate Cymbalta prior, due to side effects. Zoloft worsened his mood prior, and unknown response to Lexapro. Depakote is listed on his medication history, though it is unknown who prescribed this to him and for what reason. Will defer management of his insomnia to Psychiatry, as well. He took Pamelor and Remeron prior for insomnia, with unknown effect. He prefers not to take Valium prn, as prescribed by other providers. Note numerous ED visits for anxiety. There may be an element of borderline personality, as splitting is noted.    4. Gait imbalance and dizziness much improved; this could also be anxiety related or from his cervical stenosis. He could not afford PT as ordered at his last visit. MRI Brain 1/2019 reassuring.   5.  "Datscan ordered prior to evaluate for PD, given his worsening tremor and gait complaints; however, he was unable to afford this, due to 2K cost per patient.   6. Continue Geodon as tremor improved somewhat with this, but add back Primidone. Difficulty to discern if the SE that he experienced prior were from Cymbalta or Primidone, as they were started simultaneously per patient.   7. Suspect memory loss to be more related to anxiety and depression, than anything else, but he does have a history of CVA. Could not afford Neuropsych testing ordered in 2018.   8. Advised to change positions slowly to reduce orthostatic complaints. Improved since med adjustments per Cardiology. Never completed EEG per PCP, due to cost concerns; however, I do not suspect his episodes of dizziness to be seizure related.   9. He uses Fioricet PRN for headache - currently improved  10. Patient does not drive since CVA- should continue off driving.   11. Goal LDL for CVA prevention is less than 100 in this patient. Statin stopped in 2018 by PCP, then restarted.   12. Also for CVA prevention: continue ASA started since first event and anti-HTN meds  13. Encouraged to comply with follow up visits with this clinic.     FU 8 weeks    "This note will not be shared with the patient".         "

## 2019-03-24 ENCOUNTER — HOSPITAL ENCOUNTER (EMERGENCY)
Facility: HOSPITAL | Age: 54
Discharge: HOME | End: 2019-03-24
Attending: EMERGENCY MEDICINE
Payer: MEDICARE

## 2019-03-24 VITALS
DIASTOLIC BLOOD PRESSURE: 86 MMHG | HEART RATE: 88 BPM | SYSTOLIC BLOOD PRESSURE: 156 MMHG | TEMPERATURE: 97 F | RESPIRATION RATE: 16 BRPM | BODY MASS INDEX: 24.51 KG/M2 | OXYGEN SATURATION: 100 % | WEIGHT: 190.94 LBS

## 2019-03-24 DIAGNOSIS — R07.89 CHEST WALL TENDERNESS: Primary | ICD-10-CM

## 2019-03-24 DIAGNOSIS — R22.2 SWELLING IN CHEST: ICD-10-CM

## 2019-03-24 PROCEDURE — 63600175 PHARM REV CODE 636 W HCPCS: Performed by: EMERGENCY MEDICINE

## 2019-03-24 PROCEDURE — 99284 EMERGENCY DEPT VISIT MOD MDM: CPT | Mod: 25

## 2019-03-24 PROCEDURE — 96372 THER/PROPH/DIAG INJ SC/IM: CPT

## 2019-03-24 RX ORDER — METHYLPREDNISOLONE SOD SUCC 125 MG
125 VIAL (EA) INJECTION
Status: COMPLETED | OUTPATIENT
Start: 2019-03-24 | End: 2019-03-24

## 2019-03-24 RX ORDER — CYCLOBENZAPRINE HCL 10 MG
10 TABLET ORAL 3 TIMES DAILY PRN
Qty: 12 TABLET | Refills: 0 | Status: SHIPPED | OUTPATIENT
Start: 2019-03-24 | End: 2019-03-29

## 2019-03-24 RX ORDER — PREDNISONE 10 MG/1
10 TABLET ORAL DAILY
Qty: 21 TABLET | Refills: 0 | Status: SHIPPED | OUTPATIENT
Start: 2019-03-24 | End: 2019-04-03

## 2019-03-24 RX ADMIN — METHYLPREDNISOLONE SODIUM SUCCINATE 125 MG: 125 INJECTION, POWDER, FOR SOLUTION INTRAMUSCULAR; INTRAVENOUS at 09:03

## 2019-03-25 ENCOUNTER — PES CALL (OUTPATIENT)
Dept: ADMINISTRATIVE | Facility: CLINIC | Age: 54
End: 2019-03-25

## 2019-03-25 NOTE — ED TRIAGE NOTES
53 y.o. male presents to ER ED 01/ED 01B   Chief Complaint   Patient presents with    Breast Problem     swelling/tenderness to left breast   Pt reports swelling to left breast for three days. No acute distress noted.

## 2019-03-25 NOTE — ED PROVIDER NOTES
Encounter Date: 3/24/2019       History     Chief Complaint   Patient presents with    Breast Problem     swelling/tenderness to left breast     Patient states ''swelling noted to left side of chest'' .  Denies any injury, chest pain or SOB    The history is provided by the patient.   General Illness    The current episode started several days ago. The problem has been unchanged. The pain is at a severity of 3/10. The symptoms are relieved by rest. The symptoms are aggravated by movement. Associated symptoms include muscle aches. Pertinent negatives include no fever, no double vision, no abdominal pain, no constipation, no nausea, no vomiting, no congestion, no ear discharge, no headaches, no hearing loss, no mouth sores, no rhinorrhea, no neck pain, no neck stiffness, no cough, no shortness of breath, no rash, no discharge, no pain and no eye redness.     Review of patient's allergies indicates:   Allergen Reactions    Adhesive Rash    Latex, natural rubber Rash     Past Medical History:   Diagnosis Date    Hypercholesteremia     Hypertension     Left sided numbness     Leg weakness     Other and unspecified hyperlipidemia     Positional lightheadedness     Stroke 2014    Tremor     Weakness      Past Surgical History:   Procedure Laterality Date    BACK SURGERY      COLONOSCOPY N/A 7/19/2018    [facility]    ESOPHAGOGASTRODUODENOSCOPY (EGD) N/A 4/28/2016    Per[facility] at [facility]    POLYPECTOMY N/A 7/19/2018    Per[facility] by [facility] at [facility]     Family History   Problem Relation Age of Onset    Heart disease Father     Cancer Father      Social History     Tobacco Use    Smoking status: Never Smoker    Smokeless tobacco: Current User     Types: Snuff    Tobacco comment: 33 yr history of dip   Substance Use Topics    Alcohol use: No     Alcohol/week: 0.0 oz    Drug use: No     Review of Systems   Constitutional: Negative for  fever.   HENT: Negative for congestion, ear discharge, hearing loss, mouth sores and rhinorrhea.    Eyes: Negative for double vision, pain, discharge and redness.   Respiratory: Negative for cough and shortness of breath.    Cardiovascular: Negative for chest pain.   Gastrointestinal: Negative for abdominal pain, constipation, nausea and vomiting.   Genitourinary: Negative for enuresis and flank pain.   Musculoskeletal: Negative for back pain, gait problem, joint swelling and neck pain.   Skin: Negative for rash and wound.   Neurological: Negative for weakness and headaches.       Physical Exam     Initial Vitals [03/24/19 2116]   BP Pulse Resp Temp SpO2   (!) 156/86 88 16 97.4 °F (36.3 °C) 100 %      MAP       --         Physical Exam    Nursing note and vitals reviewed.  Constitutional: He appears well-developed and well-nourished. He is not diaphoretic. No distress.   HENT:   Head: Normocephalic and atraumatic.   Mouth/Throat: No oropharyngeal exudate.   Eyes: EOM are normal. Pupils are equal, round, and reactive to light.   Neck: Normal range of motion. Neck supple. No JVD present.   Pulmonary/Chest: Breath sounds normal. No stridor. No respiratory distress. He has no wheezes. He has no rhonchi. He has no rales.       Abdominal: Soft. Bowel sounds are normal. He exhibits no distension. There is no tenderness. There is no rebound and no guarding.   Musculoskeletal: Normal range of motion. He exhibits tenderness. He exhibits no edema.   Neurological: He is alert and oriented to person, place, and time. No sensory deficit.   Skin: No rash noted.         ED Course   Procedures  Labs Reviewed - No data to display       Imaging Results    None                               Clinical Impression:       ICD-10-CM ICD-9-CM   1. Chest wall tenderness R07.89 786.52   2. Swelling in chest R22.2 786.6         Disposition:   Disposition: Discharged  Condition: Stable                        Delmer Do MD  03/24/19 2129

## 2019-04-03 ENCOUNTER — OFFICE VISIT (OUTPATIENT)
Dept: PSYCHIATRY | Facility: CLINIC | Age: 54
End: 2019-04-03
Attending: PSYCHIATRY & NEUROLOGY
Payer: MEDICARE

## 2019-04-03 VITALS
HEART RATE: 82 BPM | WEIGHT: 189.06 LBS | DIASTOLIC BLOOD PRESSURE: 81 MMHG | SYSTOLIC BLOOD PRESSURE: 125 MMHG | RESPIRATION RATE: 19 BRPM | HEIGHT: 74 IN | BODY MASS INDEX: 24.26 KG/M2

## 2019-04-03 DIAGNOSIS — F41.1 GAD (GENERALIZED ANXIETY DISORDER): ICD-10-CM

## 2019-04-03 DIAGNOSIS — F39 UNSPECIFIED MOOD (AFFECTIVE) DISORDER: ICD-10-CM

## 2019-04-03 DIAGNOSIS — F54 PSYCHOSOMATIC FACTOR IN PHYSICAL CONDITION: Primary | ICD-10-CM

## 2019-04-03 DIAGNOSIS — F41.0 PANIC DISORDER WITHOUT AGORAPHOBIA: ICD-10-CM

## 2019-04-03 PROCEDURE — 99213 OFFICE O/P EST LOW 20 MIN: CPT | Mod: PBBFAC,25 | Performed by: PSYCHIATRY & NEUROLOGY

## 2019-04-03 PROCEDURE — 99999 PR STA SHADOW: CPT | Mod: PBBFAC,,, | Performed by: PSYCHIATRY & NEUROLOGY

## 2019-04-03 PROCEDURE — 99999 PR PBB SHADOW E&M-EST. PATIENT-LVL III: CPT | Mod: PBBFAC,,, | Performed by: PSYCHIATRY & NEUROLOGY

## 2019-04-03 PROCEDURE — 99999 PR STA SHADOW: ICD-10-PCS | Mod: PBBFAC,,, | Performed by: PSYCHIATRY & NEUROLOGY

## 2019-04-03 PROCEDURE — 90792 PSYCH DIAG EVAL W/MED SRVCS: CPT | Mod: S$PBB | Performed by: PSYCHIATRY & NEUROLOGY

## 2019-04-03 RX ORDER — ESCITALOPRAM OXALATE 10 MG/1
10 TABLET ORAL DAILY
Qty: 30 TABLET | Refills: 1 | Status: SHIPPED | OUTPATIENT
Start: 2019-04-03 | End: 2019-04-26 | Stop reason: SDUPTHER

## 2019-04-03 NOTE — PROGRESS NOTES
"Outpatient Psychiatry Initial Visit (MD/NP)    4/3/2019    Jett Ballard, a 53 y.o. male, presenting for initial evaluation visit. Met with patient.    Reason for Encounter: Referral from Viky Chong. Patient complains of   Chief Complaint   Patient presents with    Anxiety    Depression   .    History of Present Illness:     Psychiatric History  Diagnosis: denies  Providers including therapists: previously saw [facility] "to regulate medicine"  Past suicidal ideation or attempts: no  Hospitalizations: no  Family Psych Hx: Mother - Schizophrenia, Dementia  Access to a gun:yes  Seizures: no  Thyroid Disorder: no  Past Medication:  Zoloft, Lexapro, Cymbalta  Valium 10mg - makes him tired    Current Medication:  Gabapentin 100mg TID  Cogentin 0.5mg BID    Compliance / Side effects  Yes / no    Psychosocial  Development:met all milestones  Education: 11th grade  Work: disabled, had worked security  Marital Status: single  Home: fiancee   Children: no  Evangelical: Quaker  Hobbies: fishing   Diet: no  Exercise: lifts weight    Substance Use  Tobacco: no  Alcohol: no, stopped drinking 35 years  Illicit Substances:no  Misuse of prescription drugs: no  Rehab: no  Period of sobriety: 35 years    4/3/19  Patient had a stroke at 49 years old.  Since then he has had worsening anxiety and tremor.  He endorses an intention tremor.  He explains that neurology has told him it is mostly anxiety / depression related.  He also suffers from short term memory loss which could also be related to psychiatric condition.  Patient explains that he doesn't open up to anyone but could very well be depressed after losing his career, father, and signficant other all within a short span of one another.      Denies Symptoms of Depression: diminished mood or loss of interest/anhedonia; irritability, diminished energy, change in sleep, change in appetite, diminished concentration or cognition or indecisiveness, PMA/R, excessive guilt or " "hopelessness or worthlessness, suicidal ideations    Denies Changes in Sleep: trouble with initiation, maintenance, early morning awakening with inability to return to sleep, hypersomnolence     He "jumps" in his sleep    Denies Suicidal/Homicidal ideations: active/passive ideations, organized plans, future intentions    Denies psychosis or mauro     Endorses Symptoms of CAPRI: all of the following excessive anxiety/worry/fear, more days than not, about numerous issues, difficult to control, with restlessness, fatigue, poor concentration, irritability, muscle tension, sleep disturbance; causes functionally impairing distress     Endorses Symptoms of Panic Disorder: +recurrent panic attacks (palpitations/heart racing, sweating, shakiness, dyspnea, choking, chest pain/discomfort, Gi symptoms, dizzy/lightheadedness, hot/col flashes, paresthesias, derealization, fear of losing control or fear of dying), precipitated or +un-precipitated, +source of worry and/or behavioral changes secondary, with or +without agoraphobia    Has been to the emergency room because of a panic attack    History of Symptoms of Social Anxiety Disorder: excessive fear/anxiety regarding social situations with fear of being negatively evaluated, avoidaant behavior, functionally impairing distress    Endorses Symptoms of PTSD: +h/o trauma; re-experiencing/intrusive symptoms, +avoidant behavior, +negative alterations in cognition or mood, hyperarousal symptoms; with or without dissociative symptoms     He was with someone for 33 years who  in .  He was with her when she .  He used to spend all of his time at her grave.  He doesn't do that anymore       Review Of Systems:     GENERAL:  No weight gain or loss  SKIN:  No rashes or lacerations  HEAD:  No headaches  EYES:  No exophthalmos, jaundice or blindness  EARS:  No dizziness, tinnitus or hearing loss  NOSE:  No changes in smell  MOUTH & THROAT:  No dyskinetic movements or obvious " "goiter  CHEST:  No shortness of breath, hyperventilation or cough  CARDIOVASCULAR:  No tachycardia or chest pain  ABDOMEN:  No nausea, vomiting, pain, constipation or diarrhea  URINARY:  No frequency, dysuria or sexual dysfunction  ENDOCRINE:  No polydipsia, polyuria  MUSCULOSKELETAL:  Chronic pain in neck and soldier  NEUROLOGIC:  +tremor    Current Evaluation:     Nutritional Screening: Considering the patient's height and weight, medications, medical history and preferences, should a referral be made to the dietitian? no    Constitutional  Vitals:  Most recent vital signs, dated less than 90 days prior to this appointment, were reviewed.    Vitals:    04/03/19 1000   BP: 125/81   Pulse: 82   Resp: 19   Weight: 85.7 kg (189 lb 0.7 oz)   Height: 6' 2" (1.88 m)        General:  unremarkable, age appropriate     Musculoskeletal  Muscle Strength/Tone:  mild tremor possibly psychosomatic   Gait & Station:  non-ataxic     Psychiatric  Speech:  no latency; no press   Mood & Affect:  euthymic  congruent and appropriate   Thought Process:  normal and logical   Associations:  intact   Thought Content:  normal, no suicidality, no homicidality, delusions, or paranoia   Insight:  intact   Judgement: behavior is adequate to circumstances   Orientation:  grossly intact   Memory: intact for content of interview   Language: grossly intact   Attention Span & Concentration:  able to focus   Fund of Knowledge:  intact and appropriate to age and level of education       Relevant Elements of Neurological Exam: normal gait    Functioning in Relationships:  Spouse/partner: good  Peers: somewhat isolated  Employers: n/a    Laboratory Data  No visits with results within 1 Month(s) from this visit.   Latest known visit with results is:   Lab Visit on 01/22/2019   Component Date Value Ref Range Status    WBC 01/22/2019 5.84  3.90 - 12.70 K/uL Final    RBC 01/22/2019 5.16  4.60 - 6.20 M/uL Final    Hemoglobin 01/22/2019 15.5  14.0 - 18.0 " g/dL Final    Hematocrit 01/22/2019 47.8  40.0 - 54.0 % Final    MCV 01/22/2019 93  82 - 98 fL Final    MCH 01/22/2019 30.0  27.0 - 31.0 pg Final    MCHC 01/22/2019 32.4  32.0 - 36.0 g/dL Final    RDW 01/22/2019 12.7  11.5 - 14.5 % Final    Platelets 01/22/2019 284  150 - 350 K/uL Final    MPV 01/22/2019 9.5  9.2 - 12.9 fL Final    Gran # (ANC) 01/22/2019 4.6  1.8 - 7.7 K/uL Final    Lymph # 01/22/2019 0.9* 1.0 - 4.8 K/uL Final    Mono # 01/22/2019 0.3  0.3 - 1.0 K/uL Final    Eos # 01/22/2019 0.0  0.0 - 0.5 K/uL Final    Baso # 01/22/2019 0.02  0.00 - 0.20 K/uL Final    Gran% 01/22/2019 79.4* 38.0 - 73.0 % Final    Lymph% 01/22/2019 14.6* 18.0 - 48.0 % Final    Mono% 01/22/2019 5.0  4.0 - 15.0 % Final    Eosinophil% 01/22/2019 0.7  0.0 - 8.0 % Final    Basophil% 01/22/2019 0.3  0.0 - 1.9 % Final    Differential Method 01/22/2019 Automated   Final    Sodium 01/22/2019 139  136 - 145 mmol/L Final    Potassium 01/22/2019 3.8  3.5 - 5.1 mmol/L Final    Chloride 01/22/2019 103  95 - 110 mmol/L Final    CO2 01/22/2019 28  23 - 29 mmol/L Final    Glucose 01/22/2019 80  70 - 110 mg/dL Final    BUN, Bld 01/22/2019 22* 6 - 20 mg/dL Final    Creatinine 01/22/2019 0.9  0.5 - 1.4 mg/dL Final    Calcium 01/22/2019 9.8  8.7 - 10.5 mg/dL Final    Total Protein 01/22/2019 8.6* 6.0 - 8.4 g/dL Final    Albumin 01/22/2019 4.7  3.5 - 5.2 g/dL Final    Total Bilirubin 01/22/2019 0.7  0.1 - 1.0 mg/dL Final    Alkaline Phosphatase 01/22/2019 98  55 - 135 U/L Final    AST 01/22/2019 18  10 - 40 U/L Final    ALT 01/22/2019 20  10 - 44 U/L Final    Anion Gap 01/22/2019 8  8 - 16 mmol/L Final    eGFR if African American 01/22/2019 >60  >60 mL/min/1.73 m^2 Final    eGFR if non African American 01/22/2019 >60  >60 mL/min/1.73 m^2 Final    TSH 01/22/2019 0.808  0.400 - 4.000 uIU/mL Final    T4, Total 01/22/2019 8.2  4.5 - 11.5 ug/dL Final    Folate 01/22/2019 6.8  4.0 - 24.0 ng/mL Final    Vitamin B-12  01/22/2019 467  210 - 950 pg/mL Final    AChR Binding Ab, Serum 01/22/2019 0.00  <=0.02 nmol/L Final    Acetylchol Modul Ab 01/22/2019 0  % Final    Striated Muscle Ab 01/22/2019 Negative  <1:120 titer Final    MG Adult Interpretation 01/22/2019 SEE BELOW   Final         Medications  Outpatient Encounter Medications as of 4/3/2019   Medication Sig Dispense Refill    amlodipine-benazepril 5-10 mg (LOTREL) 5-10 mg per capsule Take 1 capsule by mouth 2 (two) times daily.       aspirin 81 MG Chew Take 162 mg by mouth once daily.       atorvastatin (LIPITOR) 20 MG tablet Take 20 mg by mouth every evening.   5    benztropine (COGENTIN) 0.5 MG tablet Take 1 tablet (0.5 mg total) by mouth 2 (two) times daily. 60 tablet 5    gabapentin (NEURONTIN) 100 MG capsule Take 1 capsule (100 mg total) by mouth 3 (three) times daily. 90 capsule 2    [DISCONTINUED] predniSONE (DELTASONE) 10 MG tablet Take 1 tablet (10 mg total) by mouth once daily. Take 4 tabs x 3 days, then  Take 2 tabs x 3 days, then   Take 1 tab x 3 days. for 10 days 21 tablet 0     No facility-administered encounter medications on file as of 4/3/2019.            Assessment - Diagnosis - Goals:     Impression:       ICD-10-CM ICD-9-CM   1. Psychosomatic factor in physical condition F54 316   2. CAPRI (generalized anxiety disorder) F41.1 300.02   3. Panic disorder without agoraphobia F41.0 300.01   4. Unspecified mood (affective) disorder F39 296.90       Strengths and Liabilities: Strength: Patient accepts guidance/feedback, Liability: Patient has poor health., Liability: Patient has possible cognitive impairment.    Treatment Goals:  Specify outcomes written in observable, behavioral terms:   improvement of anxiety ptsd and further delination of mood symptoms    Treatment Plan/Recommendations:   Psychosomatic / Pseudodementia / Depression    Start Lexapro 10mg QDay  Counseled    Generalized Anxiety    Counseled  Lexapro    Panic  Counseled  Lexapro    Unspecified Cognitive Deficit  Discussed possible psychosomatic / pseudodementia  MOCA 19/30 4/3/19      Return to Clinic: 1 month    Counseling time: 20  Total time: 55  Consulting clinician was informed of the encounter and consult note.

## 2019-04-25 ENCOUNTER — HOSPITAL ENCOUNTER (EMERGENCY)
Facility: HOSPITAL | Age: 54
Discharge: HOME | End: 2019-04-25
Attending: SURGERY
Payer: MEDICARE

## 2019-04-25 VITALS
DIASTOLIC BLOOD PRESSURE: 76 MMHG | WEIGHT: 189 LBS | OXYGEN SATURATION: 96 % | SYSTOLIC BLOOD PRESSURE: 116 MMHG | RESPIRATION RATE: 18 BRPM | BODY MASS INDEX: 24.27 KG/M2 | TEMPERATURE: 97 F | HEART RATE: 73 BPM

## 2019-04-25 DIAGNOSIS — F32.A ANXIETY AND DEPRESSION: Primary | ICD-10-CM

## 2019-04-25 DIAGNOSIS — F41.9 ANXIETY AND DEPRESSION: Primary | ICD-10-CM

## 2019-04-25 DIAGNOSIS — G43.109 COMPLICATED MIGRAINE: ICD-10-CM

## 2019-04-25 DIAGNOSIS — R53.83 FATIGUE: ICD-10-CM

## 2019-04-25 DIAGNOSIS — I69.90 LATE EFFECTS OF CVA (CEREBROVASCULAR ACCIDENT): ICD-10-CM

## 2019-04-25 LAB
ALBUMIN SERPL BCP-MCNC: 4.4 G/DL (ref 3.5–5.2)
ALP SERPL-CCNC: 134 U/L (ref 55–135)
ALT SERPL W/O P-5'-P-CCNC: 40 U/L (ref 10–44)
ANION GAP SERPL CALC-SCNC: 14 MMOL/L (ref 8–16)
APTT BLDCRRT: 27.1 SEC (ref 21–32)
AST SERPL-CCNC: 28 U/L (ref 10–40)
BASOPHILS # BLD AUTO: 0.03 K/UL (ref 0–0.2)
BASOPHILS NFR BLD: 0.4 % (ref 0–1.9)
BILIRUB SERPL-MCNC: 0.4 MG/DL (ref 0.1–1)
BNP SERPL-MCNC: <10 PG/ML (ref 0–99)
BUN SERPL-MCNC: 17 MG/DL (ref 6–20)
CALCIUM SERPL-MCNC: 9.9 MG/DL (ref 8.7–10.5)
CHLORIDE SERPL-SCNC: 104 MMOL/L (ref 95–110)
CK MB SERPL-MCNC: 0.9 NG/ML (ref 0.1–6.5)
CK MB SERPL-RTO: 0.7 % (ref 0–5)
CK SERPL-CCNC: 126 U/L (ref 20–200)
CK SERPL-CCNC: 126 U/L (ref 20–200)
CO2 SERPL-SCNC: 22 MMOL/L (ref 23–29)
CREAT SERPL-MCNC: 1 MG/DL (ref 0.5–1.4)
DIFFERENTIAL METHOD: ABNORMAL
EOSINOPHIL # BLD AUTO: 0.1 K/UL (ref 0–0.5)
EOSINOPHIL NFR BLD: 1.5 % (ref 0–8)
ERYTHROCYTE [DISTWIDTH] IN BLOOD BY AUTOMATED COUNT: 12.7 % (ref 11.5–14.5)
EST. GFR  (AFRICAN AMERICAN): >60 ML/MIN/1.73 M^2
EST. GFR  (NON AFRICAN AMERICAN): >60 ML/MIN/1.73 M^2
GLUCOSE SERPL-MCNC: 99 MG/DL (ref 70–110)
HCT VFR BLD AUTO: 48.9 % (ref 40–54)
HGB BLD-MCNC: 16.2 G/DL (ref 14–18)
INR PPP: 1 (ref 0.8–1.2)
LYMPHOCYTES # BLD AUTO: 1.2 K/UL (ref 1–4.8)
LYMPHOCYTES NFR BLD: 16.9 % (ref 18–48)
MAGNESIUM SERPL-MCNC: 2.7 MG/DL (ref 1.6–2.6)
MCH RBC QN AUTO: 30.2 PG (ref 27–31)
MCHC RBC AUTO-ENTMCNC: 33.1 G/DL (ref 32–36)
MCV RBC AUTO: 91 FL (ref 82–98)
MONOCYTES # BLD AUTO: 0.4 K/UL (ref 0.3–1)
MONOCYTES NFR BLD: 5 % (ref 4–15)
NEUTROPHILS # BLD AUTO: 5.5 K/UL (ref 1.8–7.7)
NEUTROPHILS NFR BLD: 76.2 % (ref 38–73)
PHOSPHATE SERPL-MCNC: 2.9 MG/DL (ref 2.7–4.5)
PLATELET # BLD AUTO: 289 K/UL (ref 150–350)
PMV BLD AUTO: 10.1 FL (ref 9.2–12.9)
POCT GLUCOSE: 95 MG/DL (ref 70–110)
POTASSIUM SERPL-SCNC: 3.5 MMOL/L (ref 3.5–5.1)
PROT SERPL-MCNC: 8.4 G/DL (ref 6–8.4)
PROTHROMBIN TIME: 10.2 SEC (ref 9–12.5)
RBC # BLD AUTO: 5.37 M/UL (ref 4.6–6.2)
SODIUM SERPL-SCNC: 140 MMOL/L (ref 136–145)
TROPONIN I SERPL DL<=0.01 NG/ML-MCNC: <0.006 NG/ML (ref 0–0.03)
TSH SERPL DL<=0.005 MIU/L-ACNC: 0.72 UIU/ML (ref 0.4–4)
WBC # BLD AUTO: 7.26 K/UL (ref 3.9–12.7)

## 2019-04-25 PROCEDURE — 96372 THER/PROPH/DIAG INJ SC/IM: CPT | Mod: 59

## 2019-04-25 PROCEDURE — 82550 ASSAY OF CK (CPK): CPT

## 2019-04-25 PROCEDURE — 84100 ASSAY OF PHOSPHORUS: CPT

## 2019-04-25 PROCEDURE — 85025 COMPLETE CBC W/AUTO DIFF WBC: CPT

## 2019-04-25 PROCEDURE — 85730 THROMBOPLASTIN TIME PARTIAL: CPT

## 2019-04-25 PROCEDURE — 80053 COMPREHEN METABOLIC PANEL: CPT

## 2019-04-25 PROCEDURE — 82962 GLUCOSE BLOOD TEST: CPT

## 2019-04-25 PROCEDURE — 82553 CREATINE MB FRACTION: CPT

## 2019-04-25 PROCEDURE — 80307 DRUG TEST PRSMV CHEM ANLYZR: CPT

## 2019-04-25 PROCEDURE — G0425 INPT/ED TELECONSULT30: HCPCS | Mod: GT,G0,, | Performed by: PSYCHIATRY & NEUROLOGY

## 2019-04-25 PROCEDURE — 84443 ASSAY THYROID STIM HORMONE: CPT

## 2019-04-25 PROCEDURE — 81000 URINALYSIS NONAUTO W/SCOPE: CPT | Mod: 59

## 2019-04-25 PROCEDURE — 84484 ASSAY OF TROPONIN QUANT: CPT

## 2019-04-25 PROCEDURE — 93010 EKG 12-LEAD: ICD-10-PCS | Mod: ,,, | Performed by: INTERNAL MEDICINE

## 2019-04-25 PROCEDURE — G0425 PR INPT TELEHEALTH CONSULT 30M: ICD-10-PCS | Mod: GT,G0,, | Performed by: PSYCHIATRY & NEUROLOGY

## 2019-04-25 PROCEDURE — 93005 ELECTROCARDIOGRAM TRACING: CPT

## 2019-04-25 PROCEDURE — 36415 COLL VENOUS BLD VENIPUNCTURE: CPT

## 2019-04-25 PROCEDURE — 83735 ASSAY OF MAGNESIUM: CPT

## 2019-04-25 PROCEDURE — 93010 ELECTROCARDIOGRAM REPORT: CPT | Mod: ,,, | Performed by: INTERNAL MEDICINE

## 2019-04-25 PROCEDURE — 99285 EMERGENCY DEPT VISIT HI MDM: CPT | Mod: 25

## 2019-04-25 PROCEDURE — 63600175 PHARM REV CODE 636 W HCPCS: Performed by: SURGERY

## 2019-04-25 PROCEDURE — 85610 PROTHROMBIN TIME: CPT

## 2019-04-25 PROCEDURE — 83880 ASSAY OF NATRIURETIC PEPTIDE: CPT

## 2019-04-25 RX ORDER — ONDANSETRON 2 MG/ML
4 INJECTION INTRAMUSCULAR; INTRAVENOUS
Status: COMPLETED | OUTPATIENT
Start: 2019-04-25 | End: 2019-04-25

## 2019-04-25 RX ORDER — MEPERIDINE HYDROCHLORIDE 25 MG/ML
25 INJECTION INTRAMUSCULAR; INTRAVENOUS; SUBCUTANEOUS
Status: COMPLETED | OUTPATIENT
Start: 2019-04-25 | End: 2019-04-25

## 2019-04-25 RX ORDER — MEPERIDINE HYDROCHLORIDE 25 MG/ML
25 INJECTION INTRAMUSCULAR; INTRAVENOUS; SUBCUTANEOUS
Status: DISCONTINUED | OUTPATIENT
Start: 2019-04-25 | End: 2019-04-26 | Stop reason: HOSPADM

## 2019-04-25 RX ORDER — ONDANSETRON 2 MG/ML
4 INJECTION INTRAMUSCULAR; INTRAVENOUS
Status: DISCONTINUED | OUTPATIENT
Start: 2019-04-25 | End: 2019-04-26 | Stop reason: HOSPADM

## 2019-04-25 RX ADMIN — ONDANSETRON 4 MG: 2 INJECTION INTRAMUSCULAR; INTRAVENOUS at 10:04

## 2019-04-25 RX ADMIN — MEPERIDINE HYDROCHLORIDE 25 MG: 25 INJECTION INTRAMUSCULAR; INTRAVENOUS; SUBCUTANEOUS at 10:04

## 2019-04-26 LAB
AMPHET+METHAMPHET UR QL: NEGATIVE
BACTERIA #/AREA URNS HPF: NORMAL /HPF
BARBITURATES UR QL SCN>200 NG/ML: NEGATIVE
BENZODIAZ UR QL SCN>200 NG/ML: NEGATIVE
BILIRUB UR QL STRIP: ABNORMAL
BZE UR QL SCN: NEGATIVE
CANNABINOIDS UR QL SCN: NEGATIVE
CLARITY UR: CLEAR
COLOR UR: YELLOW
CREAT UR-MCNC: 347.7 MG/DL (ref 23–375)
GLUCOSE UR QL STRIP: NEGATIVE
HGB UR QL STRIP: NEGATIVE
HYALINE CASTS #/AREA URNS LPF: 0 /LPF
KETONES UR QL STRIP: ABNORMAL
LEUKOCYTE ESTERASE UR QL STRIP: NEGATIVE
METHADONE UR QL SCN>300 NG/ML: NEGATIVE
MICROSCOPIC COMMENT: NORMAL
NITRITE UR QL STRIP: NEGATIVE
OPIATES UR QL SCN: NEGATIVE
PCP UR QL SCN>25 NG/ML: NEGATIVE
PH UR STRIP: 6 [PH] (ref 5–8)
PROT UR QL STRIP: ABNORMAL
RBC #/AREA URNS HPF: 1 /HPF (ref 0–4)
SP GR UR STRIP: >=1.03 (ref 1–1.03)
TOXICOLOGY INFORMATION: NORMAL
URN SPEC COLLECT METH UR: ABNORMAL
UROBILINOGEN UR STRIP-ACNC: ABNORMAL EU/DL
WBC #/AREA URNS HPF: 1 /HPF (ref 0–5)

## 2019-04-26 RX ORDER — ESCITALOPRAM OXALATE 10 MG/1
TABLET ORAL
Qty: 30 TABLET | Refills: 0 | Status: SHIPPED | OUTPATIENT
Start: 2019-04-26 | End: 2019-05-14 | Stop reason: SDUPTHER

## 2019-04-26 NOTE — SUBJECTIVE & OBJECTIVE
Woke up with symptoms?: no  Last known normal Date: 2019-04-25         Recent bleeding noted: no  Does the patient take any Blood Thinners? no  Medications: Antiplatelets:  aspirin      Past Medical History: stroke    Past Surgical History: no relevant surgical history    Family History: no relevant history    Social History: no smoking, no drinking, no drugs    Allergies: Adhesive  Latex, Natural Rubber No relevant allergies    Review of Systems   Constitutional: Negative for fever.   HENT: Negative for nosebleeds and trouble swallowing.    Respiratory: Negative for shortness of breath.    Cardiovascular: Negative for chest pain and palpitations.   Gastrointestinal: Negative for blood in stool.   Genitourinary: Negative for dysuria.   Neurological: Positive for light-headedness, numbness and headaches. Negative for speech difficulty.     Objective:   Vitals: Blood pressure (!) 159/91, pulse 91, temperature 97.1 °F (36.2 °C), temperature source Oral, weight 85.7 kg (189 lb), SpO2 96 %. Heart Rate: .    CT READ: Yes  No hemmorhage. No mass effect. No early infarct signs.     Physical Exam   Constitutional:   Mildly anxious appearing   HENT:   Head: Normocephalic and atraumatic.   Eyes: Pupils are equal, round, and reactive to light. EOM are normal.   Neck: Normal range of motion.   Pulmonary/Chest: Effort normal.   Neurological:   MS: A&OX3, speech fluent, follows commands, no neglect  CN: PERRL, EOMI, face symmetric, no dysarthria, decr sensation L V1-V3  Motor: no drift in arms or legs  Sensory: reports decr sensation to LT L arm/leg (85% compared to R side)  Coord: no ataxia on finger to nose

## 2019-04-26 NOTE — CONSULTS
[facility]  Vascular Neurology  Comprehensive Stroke Center  Tele-Consultation Note      Consults    Consulting Provider: KAREN DANIEL  Current Providers  No providers found    Patient Location:  [facility] Emergency Department  Spoke hospital nurse at bedside with patient assisting consultant.     Patient information was obtained from patient.         Assessment/Plan:     STROKE DOCUMENTATION     Acute Stroke Times:   Acute Stroke Times   Last Known Normal Date: 04/25/19  Last Known Normal Time: 2115  Stroke Team Called Date: 04/25/19  Stroke Team Called Time: 2200  Stroke Team Arrival Date: 04/25/19  Stroke Team Arrival Time: 2202  CT Interpretation Time: 2205    NIH Scale:  1a. Level of Consciousness: 0-->Alert, keenly responsive  1b. LOC Questions: 0-->Answers both questions correctly  1c. LOC Commands: 0-->Performs both tasks correctly  2. Best Gaze: 0-->Normal  3. Visual: 0-->No visual loss  4. Facial Palsy: 0-->Normal symmetrical movements  5a. Motor Arm, Left: 0-->No drift, limb holds 90 (or 45) degrees for full 10 secs  5b. Motor Arm, Right: 0-->No drift, limb holds 90 (or 45) degrees for full 10 secs  6a. Motor Leg, Left: 0-->No drift, leg holds 30 degree position for full 5 secs  6b. Motor Leg, Right: 0-->No drift, leg holds 30 degree position for full 5 secs  7. Limb Ataxia: 0-->Absent  8. Sensory: 1-->Mild-to-moderate sensory loss, patient feels pinprick is less sharp or is dull on the affected side, or there is a loss of superficial pain with pinprick, but patient is aware of being touched  9. Best Language: 0-->No aphasia, normal  10. Dysarthria: 0-->Normal  11. Extinction and Inattention (formerly Neglect): 0-->No abnormality  Total (NIH Stroke Scale): 1     Modified Porter    [place] Coma Scale:    ABCD2 Score:    [id number] Score:   HAS -BLED Score:   ICH Score:   Hunt & Villanueva Classification:       Diagnoses:   Complicated migraine  52 y/o man with  prior episodes of lightheadedness, neurological symptoms who presents with headache associated with L arm tingling.  On exam has mildly decreased sensation L hemibody, no other deficits.  Would defer tPA in setting of mild NIHSS.  Consider complicated migraine as alternate diagnosis, especially given positive symptoms of tingling. Recommend treating with migraine cocktail (toradol/reglan/IVF).  Obtain MRI brain if no improvement in symptoms despite resolution of headache.  Continue home aspirin.        Blood pressure (!) 159/91, pulse 91, temperature 97.1 °F (36.2 °C), temperature source Oral, weight 85.7 kg (189 lb), SpO2 96 %.  Alteplase Eligible?: Yes  Alteplase Recommendation: Alteplase not recommended due to Suspected stroke mimic  and minimal deficit   Possible Interventional Revascularization Candidate? No; No large vessel occlusion    Disposition Recommendation: pending further studies    Subjective:     History of Present Illness:  54 y/o man with prior R thalamic stroke, headaches (migraine?), tremor, prior episodes of lightheadedness and associated neurological symptoms, who presents for headache and L sided tingling.  He states shortly after dinner he developed a headache (7/10 severity), with lightheadedness, nausea, and tingling and cold sensation in the L arm.  He presented to the ED for these complaints.  He has had tingling of the L arm before, but not on a consistent basis.      Woke up with symptoms?: no  Last known normal Date: 2019-04-25         Recent bleeding noted: no  Does the patient take any Blood Thinners? no  Medications: Antiplatelets:  aspirin      Past Medical History: stroke    Past Surgical History: no relevant surgical history    Family History: no relevant history    Social History: no smoking, no drinking, no drugs    Allergies: Adhesive  Latex, Natural Rubber No relevant allergies    Review of Systems   Constitutional: Negative for fever.   HENT: Negative for nosebleeds and  trouble swallowing.    Respiratory: Negative for shortness of breath.    Cardiovascular: Negative for chest pain and palpitations.   Gastrointestinal: Negative for blood in stool.   Genitourinary: Negative for dysuria.   Neurological: Positive for light-headedness, numbness and headaches. Negative for speech difficulty.     Objective:   Vitals: Blood pressure (!) 159/91, pulse 91, temperature 97.1 °F (36.2 °C), temperature source Oral, weight 85.7 kg (189 lb), SpO2 96 %. Heart Rate: .    CT READ: Yes  No hemmorhage. No mass effect. No early infarct signs.     Physical Exam   Constitutional:   Mildly anxious appearing   HENT:   Head: Normocephalic and atraumatic.   Eyes: Pupils are equal, round, and reactive to light. EOM are normal.   Neck: Normal range of motion.   Pulmonary/Chest: Effort normal.   Neurological:   MS: A&OX3, speech fluent, follows commands, no neglect  CN: PERRL, EOMI, face symmetric, no dysarthria, decr sensation L V1-V3  Motor: no drift in arms or legs  Sensory: reports decr sensation to LT L arm/leg (85% compared to R side)  Coord: no ataxia on finger to nose             Recommended the emergency room physician to have a brief discussion with the patient and/or family if available regarding the risks and benefits of treatment, and to briefly document the occurrence of that discussion in his clinical encounter note.     The attending portion of this evaluation, treatment, and documentation was performed per Sammie Brasher MD via audiovisual.    Billing code:  (non-intervention mild to moderate stroke, TIA, some mimics)    · This patient has a critical neurological condition/illness, with some potential for high morbidity and mortality.  · There is a moderate probability for acute neurological change leading to clinical and possibly life-threatening deterioration requiring highest level of physician preparedness for urgent intervention.  · Care was coordinated with other physicians involved  in the patient's care.  · Radiologic studies and laboratory data were reviewed and interpreted, and plan of care was re-assessed based on the results.  · Diagnosis, treatment options and prognosis may have been discussed with the patient and/or family members or caregiver.      In your opinion, this was a: Tier 1 Van Negative    Consult End Time: 10:33 PM     Sammie Brasher MD  [facility]  Vascular Neurology   [facility]

## 2019-04-26 NOTE — HPI
52 y/o man with prior R thalamic stroke, headaches (migraine?), tremor, prior episodes of lightheadedness and associated neurological symptoms, who presents for headache and L sided tingling.  He states shortly after dinner he developed a headache (7/10 severity), with lightheadedness, nausea, and tingling and cold sensation in the L arm.  He presented to the ED for these complaints.  He has had tingling of the L arm before, but not on a consistent basis.

## 2019-04-26 NOTE — ED PROVIDER NOTES
[facility]                                                 Chief Complaint  53 y.o. male with Numbness (left arm)    History of Present Illness  Jett Ballard presents to the emergency room with headache tonight  Patient had headache and left arm numbness at 9:15 p.m., history of stroke  Patient had a right-sided stroke in 2015 with late effects since that stroke  Patient has had residual complicated migraine headaches, no weakness now  Telemedicine stroke consult immediately initiated, Dr. Brasher evaluated patient  Consider complicated migraine, not new stroke, afebrile and normotensive    The history is provided by the patient   device was not used during this ER visit    Past Medical History   -- Hypertension      -- Left sided numbness      -- Leg weakness      -- Other and unspecified hyperlipidemia      -- Positional lightheadedness      -- Stroke      -- Tremor      -- Weakness          Past Surgical History   -- Back surgery             Allergies   -- Adhesive      -- Latex, Natural Rubber      Review of Systems and Physical Exam      Review of Systems  -- Constitution - no fever, denies fatigue, no weakness, no chills  -- Eyes - no tearing or redness, no visual disturbance  -- Ear, Nose - no tinnitus or earache, no nasal congestion or discharge  -- Mouth,Throat - no sore throat, no toothache, normal voice, normal swallowing  -- Respiratory - denies cough and congestion, no shortness of breath, no GIRALDO  -- Cardiovascular - denies chest pain, no palpitations, denies claudication  -- Gastrointestinal - denies abdominal pain, nausea, vomiting, or diarrhea  -- Genitourinary - no dysuria, denies flank pain, no hematuria, no STD risk  -- Musculoskeletal - denies back pain, negative for trauma or injury  -- Neurological - headache, left hand tingling  -- Skin - denies pallor, rash, or changes in skin. no hives or welts noted  -- Psychiatric - Denies SI or HI, no psychosis or  fractured thought noted     Vital Signs  His oral temperature is 97.1 °F (36.2 °C).   His blood pressure is 159/91 and his pulse is 91.   His oxygen saturation is 96%.     Physical Exam  -- Nursing note and vitals reviewed  -- Constitutional: Appears well-developed and well-nourished  -- Head: Atraumatic. Normocephalic. No obvious abnormality  -- Eyes: Pupils are equal and reactive to light. Normal conjunctiva and lids  -- Cardiac: Normal rate, regular rhythm and normal heart sounds  -- Pulmonary: Normal respiratory effort, breath sounds clear to auscultation  -- Abdominal: Soft, no tenderness. Normal bowel sounds. Normal liver edge  -- Musculoskeletal: Normal range of motion, no effusions. Joints stable   -- Neurological: No focal deficits. Showed good interaction with staff  -- Vascular: Posterior tibial, dorsalis pedis and radial pulses 2+ bilaterally      Emergency Room Course      Lab Results     K 3.5      CO2 22 (L)   BUN 17   CREATININE 1.0   GLU 99   ALKPHOS 134   AST 28   ALT 40   BILITOT 0.4   ALBUMIN 4.4   PROT 8.4   WBC 7.26   HGB 16.2   HCT 48.9            CPKMB 0.9   TROPONINI <0.006   INR 1.0   BNP <10   DDIMER 0.28   MG 2.7 (H)   TSH 0.722     EKG  -- The EKG findings today were without concerning findings from baseline    Radiology  -- The CT of the head performed in the ER today was negative for acute pathology  -- Chest x-ray showed no infiltrate and showed no acute pathology    Medications Given  ondansetron injection 4 mg (4 mg Intravenous Not Given 4/25/19 2238)   meperidine (PF) injection 25 mg (25 mg Intravenous Not Given 4/25/19 2238)   meperidine (PF) injection 25 mg (25 mg Intramuscular Given 4/25/19 2249)   ondansetron injection 4 mg (4 mg Intramuscular Given 4/25/19 2249)     Telemedicine Stroke Consult  -- Patient was seen by  Vascular Neurology via telemedicine in the ER today  -- Please see the telemedicine notes for full evaluation of the consult in  the ER      Medical Decision Making  -- Diagnosis management comments: 53 y.o. male with headache tonight  -- patient had left-sided hand tingling with a headache, complicated migraine  -- telemedicine stroke consult states this is not a new stroke feels better  -- patient is to follow up with his neurologist, asymptomatic on discharge    Diagnosis  -- Anxiety and depression.   -- Fatigue  -- Complicated migraine  -- Late effects of CVA (cerebrovascular accident)     Disposition and Plan  -- Disposition: home  -- Condition: stable  -- Follow-up: Patient to follow up with Sana Tinsley NP in 1-2 days.  -- I advised the patient that we have found no life threatening condition today  -- At this time, I believe the patient is clinically stable for discharge.   -- The patient acknowledges that close follow up with a MD is required   -- Patient agrees to comply with all instruction and direction given in the ER    This note is dictated on M*Modal word recognition program.  There are word recognition mistakes that are occasionally missed on review.         Philippe Luis MD  04/25/19 1652

## 2019-04-26 NOTE — ED TRIAGE NOTES
Patient brought in by EMS with report of sudden onset of left arm numbness and tingling at 9:15pm tonight. Also reports nausea. Patient AAOx4. Walking with steady gait to stretcher. Full movement in all extremities. No deficits. 4mg zofran given in route.

## 2019-04-26 NOTE — ASSESSMENT & PLAN NOTE
54 y/o man with prior episodes of lightheadedness, neurological symptoms who presents with headache associated with L arm tingling.  On exam has mildly decreased sensation L hemibody, no other deficits.  Would defer tPA in setting of mild NIHSS.  Consider complicated migraine as alternate diagnosis. Recommend treating with migraine cocktail (toradol/reglan/IVF).  Obtain MRI brain if no improvement in symptoms despite resolution of headache.

## 2019-05-14 ENCOUNTER — OFFICE VISIT (OUTPATIENT)
Dept: PSYCHIATRY | Facility: CLINIC | Age: 54
End: 2019-05-14
Attending: PSYCHIATRY & NEUROLOGY
Payer: MEDICARE

## 2019-05-14 ENCOUNTER — OFFICE VISIT (OUTPATIENT)
Dept: NEUROLOGY | Facility: CLINIC | Age: 54
End: 2019-05-14
Payer: MEDICARE

## 2019-05-14 VITALS
RESPIRATION RATE: 18 BRPM | SYSTOLIC BLOOD PRESSURE: 121 MMHG | BODY MASS INDEX: 24.46 KG/M2 | HEART RATE: 68 BPM | HEIGHT: 74 IN | WEIGHT: 190.56 LBS | DIASTOLIC BLOOD PRESSURE: 62 MMHG

## 2019-05-14 VITALS
WEIGHT: 190.5 LBS | HEIGHT: 74 IN | HEART RATE: 104 BPM | RESPIRATION RATE: 16 BRPM | BODY MASS INDEX: 24.45 KG/M2 | DIASTOLIC BLOOD PRESSURE: 88 MMHG | SYSTOLIC BLOOD PRESSURE: 112 MMHG

## 2019-05-14 DIAGNOSIS — M54.12 CERVICAL RADICULAR PAIN: ICD-10-CM

## 2019-05-14 DIAGNOSIS — G43.109 COMPLICATED MIGRAINE: ICD-10-CM

## 2019-05-14 DIAGNOSIS — F39 UNSPECIFIED MOOD (AFFECTIVE) DISORDER: ICD-10-CM

## 2019-05-14 DIAGNOSIS — R25.1 TREMOR OF BOTH HANDS: Primary | ICD-10-CM

## 2019-05-14 DIAGNOSIS — R41.3 MEMORY LOSS: ICD-10-CM

## 2019-05-14 DIAGNOSIS — F32.A ANXIETY AND DEPRESSION: ICD-10-CM

## 2019-05-14 DIAGNOSIS — G44.229 CHRONIC TENSION-TYPE HEADACHE, NOT INTRACTABLE: ICD-10-CM

## 2019-05-14 DIAGNOSIS — G47.00 INSOMNIA, UNSPECIFIED TYPE: ICD-10-CM

## 2019-05-14 DIAGNOSIS — F54 PSYCHOSOMATIC FACTOR IN PHYSICAL CONDITION: Primary | ICD-10-CM

## 2019-05-14 DIAGNOSIS — R29.898 LEFT LEG WEAKNESS: ICD-10-CM

## 2019-05-14 DIAGNOSIS — F41.0 PANIC DISORDER WITHOUT AGORAPHOBIA: ICD-10-CM

## 2019-05-14 DIAGNOSIS — M54.2 NECK PAIN: ICD-10-CM

## 2019-05-14 DIAGNOSIS — K21.9 GASTROESOPHAGEAL REFLUX DISEASE WITHOUT ESOPHAGITIS: ICD-10-CM

## 2019-05-14 DIAGNOSIS — M48.02 CERVICAL STENOSIS OF SPINAL CANAL: Chronic | ICD-10-CM

## 2019-05-14 DIAGNOSIS — F41.1 GAD (GENERALIZED ANXIETY DISORDER): ICD-10-CM

## 2019-05-14 DIAGNOSIS — I10 ESSENTIAL HYPERTENSION: ICD-10-CM

## 2019-05-14 DIAGNOSIS — E78.2 MIXED HYPERLIPIDEMIA: ICD-10-CM

## 2019-05-14 DIAGNOSIS — I69.90 LATE EFFECTS OF CVA (CEREBROVASCULAR ACCIDENT): ICD-10-CM

## 2019-05-14 DIAGNOSIS — F41.9 ANXIETY AND DEPRESSION: ICD-10-CM

## 2019-05-14 DIAGNOSIS — R20.0 LEFT SIDED NUMBNESS: ICD-10-CM

## 2019-05-14 PROCEDURE — 99999 PR STA SHADOW: CPT | Mod: PBBFAC,,, | Performed by: NURSE PRACTITIONER

## 2019-05-14 PROCEDURE — 99214 OFFICE O/P EST MOD 30 MIN: CPT | Mod: S$PBB | Performed by: NURSE PRACTITIONER

## 2019-05-14 PROCEDURE — 99999 PR PBB SHADOW E&M-EST. PATIENT-LVL III: CPT | Mod: PBBFAC,,, | Performed by: PSYCHIATRY & NEUROLOGY

## 2019-05-14 PROCEDURE — 99999 PR PBB SHADOW E&M-EST. PATIENT-LVL III: ICD-10-PCS | Mod: PBBFAC,,, | Performed by: NURSE PRACTITIONER

## 2019-05-14 PROCEDURE — 99213 OFFICE O/P EST LOW 20 MIN: CPT | Mod: PBBFAC | Performed by: PSYCHIATRY & NEUROLOGY

## 2019-05-14 PROCEDURE — 99999 PR STA SHADOW: CPT | Mod: PBBFAC,,, | Performed by: PSYCHIATRY & NEUROLOGY

## 2019-05-14 PROCEDURE — 99213 OFFICE O/P EST LOW 20 MIN: CPT | Mod: PBBFAC,27 | Performed by: NURSE PRACTITIONER

## 2019-05-14 PROCEDURE — 99213 OFFICE O/P EST LOW 20 MIN: CPT | Mod: S$PBB | Performed by: PSYCHIATRY & NEUROLOGY

## 2019-05-14 PROCEDURE — 99999 PR STA SHADOW: ICD-10-PCS | Mod: PBBFAC,,, | Performed by: PSYCHIATRY & NEUROLOGY

## 2019-05-14 PROCEDURE — 99999 PR PBB SHADOW E&M-EST. PATIENT-LVL III: CPT | Mod: PBBFAC,,, | Performed by: NURSE PRACTITIONER

## 2019-05-14 RX ORDER — BUTALBITAL, ACETAMINOPHEN AND CAFFEINE 50; 325; 40 MG/1; MG/1; MG/1
1 TABLET ORAL DAILY PRN
Qty: 10 TABLET | Refills: 5 | Status: SHIPPED | OUTPATIENT
Start: 2019-05-14 | End: 2019-06-13

## 2019-05-14 RX ORDER — ESCITALOPRAM OXALATE 10 MG/1
10 TABLET ORAL DAILY
Qty: 90 TABLET | Refills: 0 | Status: SHIPPED | OUTPATIENT
Start: 2019-05-14 | End: 2019-05-14

## 2019-05-14 NOTE — PROGRESS NOTES
HPI: Jett Ballard is a 53 y.o. male with PMHx of Right thalamic CVA (5/15) and was previously followed by an outside neurologist, Dr Lorenzo for tremors and memory loss since then. He had a spell of slurred speech and bilateral weakness/facial tingling in 5/2018. Orthostatic complaints and syncope prior, which resolved with medication adjustments per Cardiology. Admit to [facility] in 6/2018 for headache, followed by unwitnessed syncope, memory loss, and questionable facial droop per partner. Head CT unremarkable; polypharmacy was believed to contribute to some of his complaints. Vertigo and gait imbalance in 1/2019 with unremarkable MRI Brain. He has HTN, HLD, GERD, and cervical stenosis.     Patient presents today for a follow up visit. Gabapentin was restarted for spinal pain and hand tremors, which was ineffective. Primidone was also restarted, which was ineffective.     He is no longer taking Gabapentin or Primidone. He is also off of Cogentin. He reports to losing a great deal of weight when taking the majority of his medications. His tremor is unchanged and is tolerable.     He saw Psychiatry today, who advised him to continue Lexapro for anxiety, but he states that he is no longer taking this. This was ineffective. Anxiety is improved since his last visit. Short term memory complaints improved since last visit, though he continues to misplace items at times.     Dizziness is resolved.     He is planning an orthopedist for the trigger finger to the 5th right finger.     He was seen in the [place] ER on 4/25/2019-arrive via ambulance for headache and left facial and left arm paresthesias. Telestroke was consulted. Head CT was unremarkable. Dr. Luis diagnosed him with migraine.     He continues with ongoing cervical complaints, which do radiate into his BUE at times. No weakness. He saw pain management prior at [facility], but never received injections.     Review of Systems   Constitutional: Negative for  fever.   HENT: Negative for nosebleeds.    Eyes: Negative for double vision.   Respiratory: Negative for hemoptysis.    Cardiovascular: Negative for leg swelling.   Gastrointestinal: Negative for blood in stool.   Genitourinary: Negative for hematuria.        Penile pain   Musculoskeletal: Positive for neck pain. Negative for falls.   Skin: Negative for rash.   Neurological: Positive for tremors, sensory change and headaches. Negative for dizziness, tingling, speech change and loss of consciousness.   Psychiatric/Behavioral: Positive for memory loss. Negative for depression. The patient is not nervous/anxious and does not have insomnia.        I have reviewed all of this patient's past medical and surgical histories as well as family and social histories and active allergies and medications as documented in the electronic medical record.    Exam:  Gen Appearance, well developed/nourished in no apparent distress  CV: 2+ distal pulses with no edema or swelling  Neuro:  MS: Awake, alert, Sustains attention but mildly slurred speech since CVA is noted. Recent/remote memory intact, Language is full to spontaneous speech/comprehension. Fund of Knowledge is full  CN: Optic discs are flat with normal vasculature, PERRL, Extraoccular movements and visual fields are full; vertical nystagmus today, as well as fast beat horizontal nystagmus to the left. Observed left lower lid blepharospasm today. No inducible diplopia or ptosis today.  Normal facial sensation and strength, Hearing symmetric, Tongue and Palate are midline and strong. Shoulder Shrug symmetric and strong.  Motor: Normal bulk, tone, no abnormal movements at rest, but tremor of both hands, L>R, with activity and movement. 5/5 strength bilateral upper/lower extremities with 2+ reflexes and no clonus  Right fifth finger is contracted.   Sensory: symmetric to light touch, pain, temp, and vibration except decreased to temp on the left side chronically.  Romberg  positive.   Cerebellar: Finger-nose,Heal-shin, Rapid alternating movements intact  Gait: Normal stance-using cane due to mild left hemiparesis from CVA  MSK survey: trigger finger right 5th digit    Imagin19 CT Head:   No CT evidence of acute intracranial abnormality.    2019 MRI Brain:   Age-appropriate generalized cerebral volume loss.  No abnormal diffusion restriction to suggest an acute infarction.  No abnormal gradient susceptibility is identified.  Few scattered foci of T2/FLAIR signal abnormality in the supratentorial white matter in keeping with chronic microvascular ischemic disease of a mild degree.  The ventricles are stable in size and configuration without hydrocephalus.  No extra-axial fluid collections.  Expected intracranial flow voids are demonstrated.  The visualized paranasal sinuses including the mastoid air cells are clear.      Impression       Age-appropriate generalized cerebral volume loss with mild chronic microvascular ischemic change.  No evidence for an acute infarction.       2018 MRI Brain:   COMPARISON:  2018    FINDINGS:  There is no evidence for acute intracranial hemorrhage or sulcal effacement.  The ventricles are normal in size without hydrocephalus.  There is no midline shift or mass effect.  Visualized paranasal sinuses and mastoid air cells are clear.   Impression       Unremarkable noncontrast CT head specifically without evidence for acute intracranial hemorrhage.  Clinical correlation and further evaluation as warranted.       2018 MRI Brain:  COMPARISON:  2018    FINDINGS:  Intracranial compartment:    Ventricles and sulci are normal in size for age without evidence of hydrocephalus. No extra-axial blood or fluid collections.    There are few scattered foci of T2/FLAIR signal abnormality in the supratentorial white matter in keeping with chronic microvascular ischemic disease of a mild degree.  No mass lesion, acute hemorrhage, edema or acute  infarct. No abnormal enhancement.    Normal vascular flow voids are preserved.    Skull/extracranial contents (limited evaluation): Bone marrow signal intensity is normal.   Impression       Age-appropriate generalized cerebral volume loss with mild chronic microvascular ischemic change.  No evidence for an acute infarction or enhancing lesion.     3/2017 CT head: No acute intracranial process.   No significant change.    2/2017 MRI brain:   1.  No MRI evidence of an acute intracranial abnormality.  2.  Minimal stable early small vessel ischemic changes.    4/2016 MRI Brain: N no evidence of acute infarction.  No appreciable change as compared to the MRI 06/10/2015 with several small nonspecific T2 hyperintensities evident in the white matter which may reflect small nonspecific areas of gliosis or perhaps very mild microvascular ischemic change including subtle somewhat linear appearing T2 hyperintense intensity adjacent to head and body of caudate on the left.    MRI Brain 2015: No evidence of acute infarction.      Small nonspecific area of T2 hyperintensity adjacent to left head of caudate may reflect nonspecific area of gliosis with additional punctate focus nonspecific T2 hyperintensity in the left parietal subcortical white matter    2015 MRA brain: Punctate acute/subacute infarction involving the right thalamus without associated hemorrhage, mass-effect or midline shift.    Age-appropriate generalized cerebral volume loss with mild degree of chronic microvascular ischemic disease    2015 CTA head:   Temporal evolution of the previously noted punctate right thalamic infarction with minimal hypodensity now visualized in this location.  Otherwise, unremarkable  CT of the head specifically without evidence for acute hemorrhage or abnormal parenchymal   enhancement.    Unremarkable CTA of the head specifically without evidence for focal stenosis or intracranial aneurysm.    5/2015 Carotid US:   #1. Findings  consistent with less than 50% stenosis in the Right carotid artery bulb.    #2. Findings consistent with less than 50% stenosis in the Left carotid artery bulb.    11/2016 CT C-spine:      Narrative     Axial images were obtained through the cervical spine with coronal and sagittal reformats. MRI is more sensitive and specific for degenerative changes if desired MR could be performed.    Densities noted within each external auditory canal suggestive of cerumen.    No obvious fracture or dislocation is noted.    Several normal size cervical nodes appear to be present bilaterally.    Vertebral body alignment appears adequate. Vertebral body heights appear well-preserved. Minimal intervertebral disc height narrowing is noted at the C4-C5 C5-C6 and C6-C7 levels    At the C2-C3 level, uncovertebral spurring is noted bilaterally. Mild right neural foraminal narrowing is noted without significant left neural foraminal narrowing and no significant central canal stenosis.    At the C3-C4 level, uncovertebral spurring appears to be present left greater than right. Mild left neural foraminal narrowing is noted without significant right neural foraminal stenosis and with minimal central canal narrowing    At the C4-C5 level, broad-based disc osteophyte protrusion appears to be present of 4 mm with moderate central canal stenosis to 6 mm in the anterior cord contact suspected. Moderate right neural foraminal stenosis is noted with probable nerve root contact. Mild left neural foraminal stenosis is noted. Facet arthropathy appears to be present bilaterally    At the C5-C6 level, disc osteophyte protrusion is suspected paracentral to the left of 4 mm. Beam hardening hinders ideal evaluation. Moderate central canal stenosis is suspected to 7 mm with anterior cord contact suspected. Spurring is noted towards the left neural foramen with moderate left neural foraminal stenosis and probable contact of the exiting nerve root. Mild  right neural foraminal stenosis is noted. Facet arthropathy appears to be present bilaterally.    At the C6-C7 level, disc protrusion appears to be present paracentric to the left of 5 mm. Moderate to severe central canal narrowing is suspected but this  is difficult to measure secondary to beam hardening. MRI may be of use. Moderate left neural foraminal stenosis is suspected with mild right-sided neural foraminal stenosis.    At the C7-T1 level, beam hardening hinders the evaluation without significant central canal stenosis or neural foraminal stenosis.      Impression         1. No obvious fracture or dislocation is noted.    2. Disc protrusions are suspected at several levels as described above with central canal stenosis particularly at the C4-C5 level, C6-C7 level and C5-C6 level. MRI could better evaluate degenerative changes if necessary     9/2015 EEG: IMPRESSION:   This is a normal awake and drowsy EEG. It is important to note that patients with epilepsy may have a normal EEG. Clinical correlation is therefore necessary.    EMG 2016:   This is a normal study of bilateral upper and lower extremities without significant evidence of neuropathy or radiculopathy.    2018 CT head:   Unremarkable noncontrast CT head specifically without evidence for acute intracranial hemorrhage. Further evaluation as warrented clinically.    2.2017 MRI brain:   1.  No MRI evidence of an acute intracranial abnormality.  2.  Minimal stable early small vessel ischemic changes.    5/2018 CT head:   No acute intracranial abnormality.    4/2016 MRI C spine:   The study is significantly degraded by motion artifact with bulging of the C4-5 disc suspected resulting in effacement of ventral subarachnoid space and with mild/moderate flattening of the ventral cord and overall mild degree of canal stenosis.  Small left paracentral protrusion of the C6-7 disc noted with effacement of ventral subarachnoid space and question of mild flattening  of the left ventral cord a low with mild bulging of the C5-6 disc suspected.    Xray right finger noted (subluxation deformity)    Labs:   5/2018 labs reviewed/ UDS negative  1/2019 MG panel negative, CBC, CMP, TSH, T4, folate, B12 all unremarkable    Assessment/Plan: Jett Ballard is a 53 y.o. male with PMHx of Right thalamic CVA (5/15) and was previously followed by an outside neurologist, Dr Lorenzo for tremors and memory loss since then. He had a spell of slurred speech and bilateral weakness/facial tingling in 5/2018. Orthostatic complaints and syncope prior, which resolved with medication adjustments per Cardiology. Admit to [facility] in 6/2018 for headache, followed by unwitnessed syncope, memory loss, and questionable facial droop per partner. Head CT unremarkable; polypharmacy was believed to contribute to some of his complaints. He has HTN, HLD, and cervical stenosis. Dizziness and gait imbalance in 1/2019 with unremarkable MRI Brain.     I recommend:  1. Gabapentin ineffective for cervical complaints. Unable to attend PT, due to cost. Saw pain management prior at [facility], but never tried spinal injections.    2. Order MRI C-spine to evaluate for progressive degenerative changes, given his cervical disc disease with moderate cervical stenosis on CT C-spine 2016.   3. He stopped Primidone and Cogentin for his hand tremors, due to reported lack of efficacy, and Gabapentin was ineffective. Tremors are tolerable and are historically much worse with anxiety  4. No longer seeing Psychiatry as anxiety is improved. He failed Lexapro. Could not tolerate Cymbalta prior, due to side effects. Zoloft worsened his mood prior. Depakote is listed on his medication history, though it is unknown who prescribed this to him and for what reason.  5. Memory loss suspected to be anxiety related. Could not afford Neuropsych testing ordered in 2018.   6. Prior dizziness suspected to be anxiety related. Workup unremarkable. Could  "not attend PT, due to cost.   6. Continue prn Fioricet for episodic migraine, which is effective. Limit to 2 days per week to prevent medication rebound.   7. Prior-Advised to change positions slowly to reduce orthostatic complaints. Resolved since med adjustments per Cardiology. Never completed EEG per PCP, due to cost concerns; however, I do not suspect his episodes of dizziness to be seizure related.   8. Patient does not drive since CVA- should continue off driving.   9. Goal LDL for CVA prevention is less than 100 in this patient. Statin stopped in 2018 by PCP, then restarted.   10. Also for CVA prevention: continue ASA started since first event and anti-HTN meds    FU 6 months    "This note will not be shared with the patient".         "

## 2019-05-14 NOTE — PROGRESS NOTES
"Outpatient Psychiatry Follow-Up Visit (MD/NP)    5/14/2019    Clinical Status of Patient:  Outpatient (Ambulatory)    Chief Complaint:  Jett Ballard is a 53 y.o. male who presents today for follow-up of anxiety.  Met with patient.      Interval History and Content of Current Session:  Interim Events/Subjective Report/Content of Current Session:     Psychiatric History  Diagnosis: denies  Providers including therapists: previously saw [facility] "to regulate medicine"  Past suicidal ideation or attempts: no  Hospitalizations: no  Family Psych Hx: Mother - Schizophrenia, Dementia  Access to a gun:yes  Seizures: no  Thyroid Disorder: no  Past Medication:  Zoloft, Lexapro, Cymbalta  Valium 10mg - makes him tired     Current Medication:  Gabapentin 100mg TID  Cogentin 0.5mg BID     Compliance / Side effects  Yes / no     Psychosocial  Development:met all milestones  Education: 11th grade  Work: disabled, had worked security  Marital Status: single  Home: fiancee   Children: no  Buddhist: Oriental orthodox  Hobbies: fishing   Diet: no  Exercise: lifts weight     Substance Use  Tobacco: no  Alcohol: no, stopped drinking 35 years  Illicit Substances:no  Misuse of prescription drugs: no  Rehab: no  Period of sobriety: 35 years     4/3/19  Patient had a stroke at 49 years old.  Since then he has had worsening anxiety and tremor.  He endorses an intention tremor.  He explains that neurology has told him it is mostly anxiety / depression related.  He also suffers from short term memory loss which could also be related to psychiatric condition.  Patient explains that he doesn't open up to anyone but could very well be depressed after losing his career, father, and signficant other all within a short span of one another.      5/14  Patient feels he doesn't need to be seen psychiatry.  His memory loss and anxiety are only marginally improved.  He is actively seeking marijuana.         Denies Symptoms of Depression: diminished mood or loss of " "interest/anhedonia; irritability, diminished energy, change in sleep, change in appetite, diminished concentration or cognition or indecisiveness, PMA/R, excessive guilt or hopelessness or worthlessness, suicidal ideations     Denies Changes in Sleep: trouble with initiation, maintenance, early morning awakening with inability to return to sleep, hypersomnolence      He "jumps" in his sleep     Denies Suicidal/Homicidal ideations: active/passive ideations, organized plans, future intentions     Denies psychosis or mauro      Continued Symptoms of CAPRI: all of the following excessive anxiety/worry/fear, more days than not, about numerous issues, difficult to control, with restlessness, fatigue, poor concentration, irritability, muscle tension, sleep disturbance; causes functionally impairing distress     Improved Symptoms of Panic Disorder: +recurrent panic attacks (palpitations/heart racing, sweating, shakiness, dyspnea, choking, chest pain/discomfort, Gi symptoms, dizzy/lightheadedness, hot/col flashes, paresthesias, derealization, fear of losing control or fear of dying), precipitated or +un-precipitated, +source of worry and/or behavioral changes secondary, with agoraphobia     Has not had a panic attack since starting lexapro.  He has significant avoidance     History of Symptoms of Social Anxiety Disorder: excessive fear/anxiety regarding social situations with fear of being negatively evaluated, avoidaant behavior, functionally impairing distress     Continued Symptoms of PTSD: +h/o trauma; re-experiencing/intrusive symptoms, +avoidant behavior, +negative alterations in cognition or mood, hyperarousal symptoms; with or without dissociative symptoms      He was with someone for 33 years who  in 2013.  He was with her when she .  He used to spend all of his time at her grave.  He doesn't do that anymore       Review of Systems     GENERAL:  No weight gain or loss  SKIN:  No rashes or lacerations  HEAD:  " "No headaches  EYES:  No exophthalmos, jaundice or blindness  EARS:  No dizziness, tinnitus or hearing loss  NOSE:  No changes in smell  MOUTH & THROAT:  No dyskinetic movements or obvious goiter  CHEST:  No shortness of breath, hyperventilation or cough  CARDIOVASCULAR:  No tachycardia or chest pain  ABDOMEN:  No nausea, vomiting, pain, constipation or diarrhea  URINARY:  No frequency, dysuria or sexual dysfunction  ENDOCRINE:  No polydipsia, polyuria  MUSCULOSKELETAL:  Chronic pain in neck and soldier  NEUROLOGIC:  +tremor        Past Medical, Family and Social History: The patient's past medical, family and social history have been reviewed and updated as appropriate within the electronic medical record - see encounter notes.    Compliance: yes    Side effects: decreased libido    Risk Parameters:  Patient reports no suicidal ideation  Patient reports no homicidal ideation  Patient reports no self-injurious behavior  Patient reports no violent behavior    Exam (detailed: at least 9 elements; comprehensive: all 15 elements)   Constitutional  Vitals:  Most recent vital signs, dated less than 90 days prior to this appointment, were reviewed.   Vitals:    05/14/19 1233   BP: 121/62   Pulse: 68   Resp: 18   Weight: 86.4 kg (190 lb 9.4 oz)   Height: 6' 2" (1.88 m)        General:  unremarkable, age appropriate     Musculoskeletal  Muscle Strength/Tone:  tremor noted baseline   Gait & Station:  non-ataxic     Psychiatric  Speech:  no latency; no press   Mood & Affect:  steady, euthymic  congruent and appropriate   Thought Process:  normal and logical   Associations:  intact   Thought Content:  normal, no suicidality, no homicidality, delusions, or paranoia   Insight:  intact   Judgement: behavior is adequate to circumstances   Orientation:  grossly intact   Memory: intact for content of interview   Language: grossly intact   Attention Span & Concentration:  able to focus   Fund of Knowledge:  intact and appropriate to " age and level of education     Assessment and Diagnosis   Status/Progress: Based on the examination today, the patient's problem(s) is/are adequately but not ideally controlled.  New problems have not been presented today.   Co-morbidities are complicating management of the primary condition.  There are no active rule-out diagnoses for this patient at this time.     General Impression:       ICD-10-CM ICD-9-CM   1. Psychosomatic factor in physical condition F54 316   2. CAPRI (generalized anxiety disorder) F41.1 300.02   3. Panic disorder without agoraphobia F41.0 300.01   4. Unspecified mood (affective) disorder F39 296.90       Intervention/Counseling/Treatment Plan     Psychosomatic / Pseudodementia / Depression     Continue Lexapro 10mg QDay  Counseled     Generalized Anxiety   Counseled  Lexapro     Panic  Counseled  Lexapro     Unspecified Cognitive Deficit  Discussed possible psychosomatic / pseudodementia  MOCA 19/30 4/3/19    Patient will continue lexapro for another two months and consider tapering off if not helpful.  He does not want follow up    Discussed diagnosis, risks and benefits of proposed treatment vs alternative treatments vs no treatment, and potential side effects of these treatments. The patient expresses understanding of the above and displays the capacity to agree with this treatment given said understanding. Patient also agrees that, currently, the benefits outweigh the risks and would like to pursue treatment at this time.    Checked [place]  and no irregularities were noted.        Return to Clinic: as needed

## 2019-05-15 ENCOUNTER — HOSPITAL ENCOUNTER (OUTPATIENT)
Dept: RADIOLOGY | Facility: HOSPITAL | Age: 54
Discharge: HOME | End: 2019-05-15
Attending: NURSE PRACTITIONER
Payer: MEDICARE

## 2019-05-15 DIAGNOSIS — M54.2 NECK PAIN: ICD-10-CM

## 2019-05-15 DIAGNOSIS — M54.12 CERVICAL RADICULAR PAIN: ICD-10-CM

## 2019-05-15 PROCEDURE — 72141 MRI NECK SPINE W/O DYE: CPT | Mod: TC

## 2019-05-16 DIAGNOSIS — M54.2 NECK PAIN: Primary | ICD-10-CM

## 2019-05-16 DIAGNOSIS — M50.90 CERVICAL DISC DISEASE: ICD-10-CM

## 2019-05-24 ENCOUNTER — TELEPHONE (OUTPATIENT)
Dept: PAIN MEDICINE | Facility: CLINIC | Age: 54
End: 2019-05-24

## 2019-05-24 NOTE — TELEPHONE ENCOUNTER
----- Message from Courtney Ye MA sent at 2019 11:11 AM CDT -----  Contact: Patient  Jett Ballard  MRN: [id number]  : 1965  PCP: Sana Tinsley  Home Phone      [phone]  Work Phone      Not on file.  Mobile          [phone]      MESSAGE:  Is requesting an appointment with Pain Management. Viky Chong NP put in a referral. He is asking for a return call to [phone].

## 2019-05-29 ENCOUNTER — TELEPHONE (OUTPATIENT)
Dept: INTERNAL MEDICINE | Facility: CLINIC | Age: 54
End: 2019-05-29

## 2019-05-29 NOTE — TELEPHONE ENCOUNTER
Patient notified signed order was faxed yesterday afternoon. Patient verbalized understanding. Order scanned to chart.

## 2019-05-29 NOTE — TELEPHONE ENCOUNTER
----- Message from Fabiola Springer sent at 2019  8:44 AM CDT -----  Contact: self   Jett Ballard  MRN: [id number]  : 1965  PCP: Sana Tinsley  Home Phone      [phone]  Work Phone      Not on file.  Mobile          [phone]    MESSAGE:   The pt had back surgery in . He requested a back brace with [employer] (name unknown per pt) The company will require a POS # to complete the patients medical supply orders     Phone # [phone]    [id number] - [place] - [address]

## 2019-08-08 ENCOUNTER — OFFICE VISIT (OUTPATIENT)
Dept: INTERNAL MEDICINE | Facility: CLINIC | Age: 54
End: 2019-08-08
Payer: MEDICARE

## 2019-08-08 VITALS
DIASTOLIC BLOOD PRESSURE: 82 MMHG | WEIGHT: 195.31 LBS | HEART RATE: 92 BPM | HEIGHT: 74 IN | SYSTOLIC BLOOD PRESSURE: 114 MMHG | RESPIRATION RATE: 18 BRPM | BODY MASS INDEX: 25.07 KG/M2

## 2019-08-08 DIAGNOSIS — I10 ESSENTIAL HYPERTENSION: ICD-10-CM

## 2019-08-08 DIAGNOSIS — Z86.73 HISTORY OF CVA (CEREBROVASCULAR ACCIDENT): ICD-10-CM

## 2019-08-08 DIAGNOSIS — N64.4 BREAST PAIN, LEFT: Primary | ICD-10-CM

## 2019-08-08 DIAGNOSIS — N63.0 SWELLING OF BREAST: ICD-10-CM

## 2019-08-08 PROCEDURE — 99999 PR PBB SHADOW E&M-EST. PATIENT-LVL IV: CPT | Mod: PBBFAC,,, | Performed by: NURSE PRACTITIONER

## 2019-08-08 PROCEDURE — 99213 OFFICE O/P EST LOW 20 MIN: CPT | Mod: S$PBB | Performed by: NURSE PRACTITIONER

## 2019-08-08 PROCEDURE — 99214 OFFICE O/P EST MOD 30 MIN: CPT | Mod: PBBFAC | Performed by: NURSE PRACTITIONER

## 2019-08-08 PROCEDURE — 99999 PR STA SHADOW: CPT | Mod: PBBFAC,,, | Performed by: NURSE PRACTITIONER

## 2019-08-08 PROCEDURE — 99999 PR STA SHADOW: ICD-10-PCS | Mod: PBBFAC,,, | Performed by: NURSE PRACTITIONER

## 2019-08-08 RX ORDER — PANTOPRAZOLE SODIUM 40 MG/1
40 TABLET, DELAYED RELEASE ORAL DAILY
Refills: 11 | COMMUNITY
Start: 2019-06-09 | End: 2020-02-18 | Stop reason: SDUPTHER

## 2019-08-08 NOTE — PROGRESS NOTES
Subjective:           Patient ID: Jett Ballard is a 53 y.o. male.    Chief Complaint: Edema (left chest)    54 YO WM + smoker; new to me; known to fellow providers; here with c/o left breast swelling   Slight tenderness no mass, has been there for 2 weeks - notes it is more swollen than when he had it last in 2018 and now very tender; no redness     Notes tremors have resolved with getting off meds; feeling much better since getting off most of meds and only taking minimal Rx.   Denies smoking; chews tobacco but smells stronlgy of cigarette smoke        Review of Systems   Constitutional: Negative for chills and fever.   HENT: Negative for congestion, postnasal drip and sore throat.    Eyes: Negative for photophobia.   Respiratory: Negative for cough, chest tightness and shortness of breath.    Cardiovascular: Negative for chest pain, palpitations and leg swelling.   Gastrointestinal: Negative for abdominal distention, abdominal pain, blood in stool and vomiting.   Genitourinary: Negative for dysuria, flank pain and hematuria.   Musculoskeletal: Positive for arthralgias and neck pain. Negative for back pain.        ROM limited   Skin: Negative for color change, pallor, rash and wound.   Neurological: Negative for dizziness, seizures, facial asymmetry, speech difficulty, weakness, light-headedness, numbness and headaches.   Hematological: Does not bruise/bleed easily.   Psychiatric/Behavioral: Negative for agitation and suicidal ideas. The patient is not nervous/anxious.        Objective:      Physical Exam   Constitutional: He is oriented to person, place, and time. He appears well-developed and well-nourished.   HENT:   Head: Normocephalic and atraumatic.   Cardiovascular: Normal rate, regular rhythm, normal heart sounds and intact distal pulses.   No murmur heard.  _+ Left breast swelling and tenderness, no mass palpated     Pulmonary/Chest: Effort normal and breath sounds normal. No stridor. No respiratory  distress. He has no wheezes. He has no rales.   Abdominal: Soft. Bowel sounds are normal. He exhibits no distension and no mass. There is no tenderness. There is no guarding.   Musculoskeletal: He exhibits no edema, tenderness ( ) or deformity.   Chronic neck pain right little finger restricted   Neurological: He is alert and oriented to person, place, and time. He has normal reflexes.   Skin: Skin is warm and dry. Capillary refill takes less than 2 seconds.   Nursing note and vitals reviewed.      Assessment:       1. Breast pain, left    2. Swelling of breast    3. Essential hypertension        Plan:   Jett was seen today for edema.    Diagnoses and all orders for this visit:    Breast pain, left  -     Mammo Digital Diagnostic Left w/ Gopal; Future    Swelling of breast  Comments:  left   Orders:  -     Cancel: US Breast Left Limited; Future  -     Mammo Digital Diagnostic Left w/ Gopal; Future    Essential hypertension      Problem List Items Addressed This Visit        Cardiac/Vascular    Essential hypertension      Other Visit Diagnoses     Breast pain, left    -  Primary    Relevant Orders    Mammo Digital Diagnostic Left w/ Gopal    Swelling of breast        left     Relevant Orders    Mammo Digital Diagnostic Left w/ Gopal

## 2019-08-11 ENCOUNTER — HOSPITAL ENCOUNTER (EMERGENCY)
Facility: HOSPITAL | Age: 54
Discharge: HOME | End: 2019-08-11
Attending: SURGERY
Payer: MEDICARE

## 2019-08-11 VITALS
TEMPERATURE: 97 F | SYSTOLIC BLOOD PRESSURE: 144 MMHG | HEIGHT: 74 IN | DIASTOLIC BLOOD PRESSURE: 95 MMHG | HEART RATE: 111 BPM | OXYGEN SATURATION: 98 % | BODY MASS INDEX: 25.12 KG/M2 | WEIGHT: 195.75 LBS

## 2019-08-11 DIAGNOSIS — N64.4 BREAST PAIN, LEFT: Primary | ICD-10-CM

## 2019-08-11 PROCEDURE — 63600175 PHARM REV CODE 636 W HCPCS: Performed by: SURGERY

## 2019-08-11 PROCEDURE — 96372 THER/PROPH/DIAG INJ SC/IM: CPT

## 2019-08-11 PROCEDURE — 99284 EMERGENCY DEPT VISIT MOD MDM: CPT | Mod: 25

## 2019-08-11 RX ORDER — IBUPROFEN 800 MG/1
800 TABLET ORAL EVERY 6 HOURS PRN
Qty: 20 TABLET | Refills: 0 | Status: SHIPPED | OUTPATIENT
Start: 2019-08-11 | End: 2019-11-06

## 2019-08-11 RX ORDER — DOXYCYCLINE 100 MG/1
100 CAPSULE ORAL 2 TIMES DAILY
Qty: 20 CAPSULE | Refills: 0 | Status: SHIPPED | OUTPATIENT
Start: 2019-08-11 | End: 2019-08-21

## 2019-08-11 RX ORDER — KETOROLAC TROMETHAMINE 30 MG/ML
60 INJECTION, SOLUTION INTRAMUSCULAR; INTRAVENOUS
Status: COMPLETED | OUTPATIENT
Start: 2019-08-11 | End: 2019-08-11

## 2019-08-11 RX ADMIN — KETOROLAC TROMETHAMINE 60 MG: 30 INJECTION, SOLUTION INTRAMUSCULAR at 10:08

## 2019-08-12 NOTE — ED PROVIDER NOTES
[facility]                                                 Chief Complaint  53 y.o. male with Breast Pain (left breast swelling and pain x 2 weeks, worsening)    History of Present Illness  Jett Ballard presents to the emergency room with left breast pain  Patient has had left breast swelling for last couple weeks per ER interview  Patient went to his PCP recently and mammogram was ordered on 19th  Patient was not satisfied with this stating his left breast swells and hurts  I appreciate no left breast mass, I appreciate no left breast mammary tissue    The history is provided by the patient   device was not used during this ER visit    Past Medical History   -- Hypertension      -- Left sided numbness      -- Leg weakness      -- Other and unspecified hyperlipidemia      -- Positional lightheadedness      -- Stroke      -- Tremor      -- Weakness          Past Surgical History   -- Back surgery             Allergies   -- Adhesive      -- Latex, Natural Rubber      I have reviewed all of this patient's past medical, surgical, family, and social   histories as well as active allergies and medications documented in the  electronic medical record    Review of Systems and Physical Exam      Review of Systems  -- Constitution - no fever, denies fatigue, no weakness, no chills  -- Eyes - no tearing or redness, no visual disturbance  -- Ear, Nose - no tinnitus or earache, no nasal congestion or discharge  -- Mouth,Throat - no sore throat, no toothache, normal voice, normal swallowing  -- Respiratory - denies cough and congestion, no shortness of breath, no GIRALDO  -- Cardiovascular - denies chest pain, no palpitations, denies claudication  -- Gastrointestinal - denies abdominal pain, nausea, vomiting, or diarrhea  -- Genitourinary - no dysuria, denies flank pain, no hematuria, no STD risk  -- Musculoskeletal - denies back pain, negative for trauma or injury  -- Neurological - no  headache, denies weakness or seizure; no LOC  -- Skin - left breast pain and possible mass    Vital Signs  His tympanic temperature is 97.3 °F (36.3 °C).   His blood pressure is 144/95 and his pulse is 111   His oxygen saturation is 98%.     Physical Exam  -- Nursing note and vitals reviewed  -- Constitutional: Appears well-developed and well-nourished  -- Head: Atraumatic. Normocephalic. No obvious abnormality  -- Eyes: Pupils are equal and reactive to light. Normal conjunctiva and lids  -- Cardiac: Normal rate, regular rhythm and normal heart sounds  -- Pulmonary: Normal respiratory effort, breath sounds clear to auscultation  -- Abdominal: Soft, no tenderness. Normal bowel sounds. Normal liver edge  -- Musculoskeletal: Normal range of motion, no effusions. Joints stable   -- Neurological: No focal deficits. Showed good interaction with staff  -- Vascular: Posterior tibial, dorsalis pedis and radial pulses 2+ bilaterally    -- Lymphatics: No cervical or peripheral lymphadenopathy. No edema noted  -- Skin: Warm and dry. No evidence of rash or cellulitis    Emergency Room Course      Treatment and Evaluation  -- IM 60 mg Toradol given today in the ER    ED Physician Management  -- Diagnosis management comments: 53 y.o. male with left breast pain  -- mammogram ordered for a week, the patient wants antibiotics prescribed  -- have no idea why the patient wants antibiotics but does not look infected today  -- patient wants I more urgent workup, ultrasound ordered tomorrow morning  -- patient will get an ultrasound outpatient the left breast, tomorrow 9:00 a.m.    Diagnosis  -- The encounter diagnosis was Breast pain, left.    Disposition and Plan  -- Disposition: home  -- Condition: stable  -- Follow-up: Patient to follow up with Sana Tinsley NP in 1-2 days.  -- I advised the patient that we have found no life threatening condition today  -- At this time, I believe the patient is clinically stable for discharge.    -- The patient acknowledges that close follow up with a MD is required   -- Patient agrees to comply with all instruction and direction given in the ER    This note is dictated on M*Modal word recognition program.  There are word recognition mistakes that are occasionally missed on review.         Philippe Luis MD  08/11/19 [id number]

## 2019-08-12 NOTE — ED TRIAGE NOTES
53 y.o. male presents to ER   Chief Complaint   Patient presents with    Breast Pain     left breast swelling and pain x 2 weeks, worsening   . No acute distress noted.  Qualifies pain as tenderness and burning.  States saw PCP for same who, after consulting with Dr. Sow, said there was no treatment needed (per patient).  MMG scheduled 8/19/2019.

## 2019-08-13 ENCOUNTER — HOSPITAL ENCOUNTER (OUTPATIENT)
Dept: RADIOLOGY | Facility: HOSPITAL | Age: 54
Discharge: HOME | End: 2019-08-13
Attending: NURSE PRACTITIONER
Payer: MEDICARE

## 2019-08-13 VITALS — BODY MASS INDEX: 25.03 KG/M2 | WEIGHT: 195 LBS | HEIGHT: 74 IN

## 2019-08-13 DIAGNOSIS — N63.0 SWELLING OF BREAST: ICD-10-CM

## 2019-08-13 DIAGNOSIS — N64.4 BREAST PAIN, LEFT: ICD-10-CM

## 2019-08-13 PROCEDURE — 77061 BREAST TOMOSYNTHESIS UNI: CPT | Mod: 26,LT,, | Performed by: RADIOLOGY

## 2019-08-13 PROCEDURE — 77065 DX MAMMO INCL CAD UNI: CPT | Mod: TC,LT

## 2019-08-13 PROCEDURE — 77065 DX MAMMO INCL CAD UNI: CPT | Mod: 26,LT,, | Performed by: RADIOLOGY

## 2019-08-13 PROCEDURE — 77061 MAMMO DIGITAL DIAGNOSTIC LEFT WITH TOMOSYNTHESIS_CAD: ICD-10-PCS | Mod: 26,LT,, | Performed by: RADIOLOGY

## 2019-08-13 PROCEDURE — 77065 MAMMO DIGITAL DIAGNOSTIC LEFT WITH TOMOSYNTHESIS_CAD: ICD-10-PCS | Mod: 26,LT,, | Performed by: RADIOLOGY

## 2019-08-13 PROCEDURE — 77061 BREAST TOMOSYNTHESIS UNI: CPT | Mod: TC,LT

## 2019-08-20 ENCOUNTER — OFFICE VISIT (OUTPATIENT)
Dept: INTERNAL MEDICINE | Facility: CLINIC | Age: 54
End: 2019-08-20
Payer: MEDICARE

## 2019-08-20 VITALS
OXYGEN SATURATION: 98 % | HEIGHT: 74 IN | RESPIRATION RATE: 18 BRPM | SYSTOLIC BLOOD PRESSURE: 112 MMHG | HEART RATE: 88 BPM | DIASTOLIC BLOOD PRESSURE: 68 MMHG | WEIGHT: 195.13 LBS | BODY MASS INDEX: 25.04 KG/M2

## 2019-08-20 DIAGNOSIS — N63.0 SWOLLEN BREAST: ICD-10-CM

## 2019-08-20 DIAGNOSIS — K02.9 DENTAL CARIES: ICD-10-CM

## 2019-08-20 DIAGNOSIS — L30.9 DERMATITIS: ICD-10-CM

## 2019-08-20 DIAGNOSIS — Z86.73 HISTORY OF CVA (CEREBROVASCULAR ACCIDENT): Primary | ICD-10-CM

## 2019-08-20 PROCEDURE — 99999 PR PBB SHADOW E&M-EST. PATIENT-LVL III: CPT | Mod: PBBFAC,,, | Performed by: NURSE PRACTITIONER

## 2019-08-20 PROCEDURE — 99999 PR STA SHADOW: ICD-10-PCS | Mod: PBBFAC,,, | Performed by: NURSE PRACTITIONER

## 2019-08-20 PROCEDURE — 99213 OFFICE O/P EST LOW 20 MIN: CPT | Mod: S$PBB | Performed by: NURSE PRACTITIONER

## 2019-08-20 PROCEDURE — 99213 OFFICE O/P EST LOW 20 MIN: CPT | Mod: PBBFAC | Performed by: NURSE PRACTITIONER

## 2019-08-20 PROCEDURE — 99999 PR STA SHADOW: CPT | Mod: PBBFAC,,, | Performed by: NURSE PRACTITIONER

## 2019-08-20 RX ORDER — TRIAMCINOLONE ACETONIDE 1 MG/G
CREAM TOPICAL 2 TIMES DAILY
Qty: 80 G | Refills: 0 | Status: SHIPPED | OUTPATIENT
Start: 2019-08-20 | End: 2020-02-11

## 2019-08-20 NOTE — PROGRESS NOTES
Subjective:       Patient ID: Jett Ballard is a 53 y.o. male.    Chief Complaint: Follow-up (left breast swelling, tenderness since visit on 8/8/19, ER visit 8/11/19, normal mammogram, patient states symptoms are the same since visit on 8/8/19, denies any other complaints )    HPI: Pt presents to clinic today known to me with c/o breast pain, swelling and tenderness. He reports that he has had this in the past. Stopped his prosate meds and got better. He started with this again at beginning of this month. He reports that he was seen by Tisha and had Mammo. It was fatty tissue. Was then seen in ER because no better. Was given abx doxy and ibuprofen. He reports that he is still taking the abx without relief. Unable to take the ibuprofen due to ulcer in stomach.     Also has ithcy rash to waist line. Has had it for over a year, just spreading.   Review of Systems   Constitutional: Negative for fatigue and fever.   Respiratory: Negative for cough and shortness of breath.    Cardiovascular: Negative for chest pain and palpitations.   Gastrointestinal: Negative for abdominal distention, abdominal pain, constipation, diarrhea, nausea and vomiting.   Genitourinary: Negative for dysuria and urgency.   Musculoskeletal: Positive for myalgias. Negative for arthralgias.        Left breast tenderness   Skin: Negative for rash and wound.       Objective:      Physical Exam   Constitutional: He is oriented to person, place, and time. He appears well-developed and well-nourished.   HENT:   Head: Normocephalic and atraumatic.   Neck: No JVD present.   Cardiovascular: Normal rate, regular rhythm and normal heart sounds.   No murmur heard.  Pulmonary/Chest: Effort normal and breath sounds normal. No stridor. No respiratory distress. He has no wheezes. He has no rales.   Abdominal: Soft. Bowel sounds are normal. There is no tenderness.   Musculoskeletal: He exhibits tenderness. He exhibits no edema.   Neurological: He is alert and  oriented to person, place, and time.   Skin: Skin is warm and dry.        Swollen tender breast when compared to right. No nodules palp   Psychiatric: He has a normal mood and affect. His behavior is normal. Judgment and thought content normal.   Nursing note and vitals reviewed.      Assessment:       1. History of CVA (cerebrovascular accident)    2. Swollen breast    3. Dermatitis    4. Dental caries        Plan:     Problem List Items Addressed This Visit     History of CVA (cerebrovascular accident) - Primary      Other Visit Diagnoses     Swollen breast        Dermatitis        Relevant Medications    triamcinolone acetonide 0.1% (KENALOG) 0.1 % cream    Dental caries        Relevant Orders    Ambulatory Referral to Dentistry      D/c statin drug  F/u 4 weeks  If breast tenderness better will resume a different statin from lipitor. He does report that he recently had this dose increased per Dr Armijo  If this does nothing then resume lipitor and look into other causes.     Discussed importance of f/u as he has hx of CVA and needs statin therapy

## 2019-09-03 ENCOUNTER — PATIENT OUTREACH (OUTPATIENT)
Dept: ADMINISTRATIVE | Facility: HOSPITAL | Age: 54
End: 2019-09-03

## 2019-09-17 ENCOUNTER — OFFICE VISIT (OUTPATIENT)
Dept: INTERNAL MEDICINE | Facility: CLINIC | Age: 54
End: 2019-09-17
Payer: MEDICARE

## 2019-09-17 VITALS
HEART RATE: 87 BPM | BODY MASS INDEX: 24.75 KG/M2 | WEIGHT: 192.88 LBS | HEIGHT: 74 IN | DIASTOLIC BLOOD PRESSURE: 80 MMHG | OXYGEN SATURATION: 97 % | SYSTOLIC BLOOD PRESSURE: 118 MMHG

## 2019-09-17 DIAGNOSIS — K21.9 GASTROESOPHAGEAL REFLUX DISEASE WITHOUT ESOPHAGITIS: ICD-10-CM

## 2019-09-17 DIAGNOSIS — F33.41 RECURRENT MAJOR DEPRESSIVE DISORDER, IN PARTIAL REMISSION: ICD-10-CM

## 2019-09-17 DIAGNOSIS — E78.2 MIXED HYPERLIPIDEMIA: Primary | ICD-10-CM

## 2019-09-17 DIAGNOSIS — Z86.73 HISTORY OF CVA (CEREBROVASCULAR ACCIDENT): ICD-10-CM

## 2019-09-17 DIAGNOSIS — I10 ESSENTIAL HYPERTENSION: ICD-10-CM

## 2019-09-17 LAB
CHOLEST/HDLC SERPL: 3.5 {RATIO}
CHOLESTEROL, TOTAL: 225
HDLC SERPL-MCNC: 64 MG/DL (ref 35–70)
LDL CHOLESTEROL DIRECT: 166 MG/DL
NON HDL CHOL. (LDL+VLDL): 161
TRIGL SERPL-MCNC: 93 MG/DL
VLDL CHOLESTEROL: 19 MG/DL

## 2019-09-17 PROCEDURE — 99999 PR PBB SHADOW E&M-EST. PATIENT-LVL III: CPT | Mod: PBBFAC,,, | Performed by: NURSE PRACTITIONER

## 2019-09-17 PROCEDURE — 99999 PR STA SHADOW: ICD-10-PCS | Mod: PBBFAC,,, | Performed by: NURSE PRACTITIONER

## 2019-09-17 PROCEDURE — 99213 OFFICE O/P EST LOW 20 MIN: CPT | Mod: PBBFAC | Performed by: NURSE PRACTITIONER

## 2019-09-17 PROCEDURE — 99213 OFFICE O/P EST LOW 20 MIN: CPT | Mod: S$PBB | Performed by: NURSE PRACTITIONER

## 2019-09-17 PROCEDURE — 99999 PR STA SHADOW: CPT | Mod: PBBFAC,,, | Performed by: NURSE PRACTITIONER

## 2019-09-17 NOTE — PROGRESS NOTES
Subjective:       Patient ID: Jett Ballard is a 54 y.o. male.    Chief Complaint: Follow-up (left breast)    HPI: Pt presents to clinic today known to me with c/o breast tenderness better. It is still slightly tender but better. Completed abx and still holding lipitor. Did labs for Dr Armijo today. Will get results and f/u 9/20.   Review of Systems   Constitutional: Negative for chills and fever.   HENT: Negative for congestion, postnasal drip and sore throat.    Eyes: Negative for photophobia.   Respiratory: Negative for chest tightness and shortness of breath.    Cardiovascular: Negative for chest pain.   Gastrointestinal: Negative for abdominal distention, abdominal pain, blood in stool and vomiting.   Genitourinary: Negative for dysuria, flank pain and hematuria.   Musculoskeletal: Negative for back pain.   Skin: Negative for pallor.   Neurological: Negative for dizziness, seizures, facial asymmetry, speech difficulty and numbness.   Hematological: Does not bruise/bleed easily.   Psychiatric/Behavioral: Negative for agitation and suicidal ideas. The patient is not nervous/anxious.        Objective:      Physical Exam   Constitutional: He is oriented to person, place, and time. He appears well-developed and well-nourished.   HENT:   Head: Normocephalic and atraumatic.   Nose: Nose normal.   Mouth/Throat: Oropharynx is clear and moist.   Eyes: Pupils are equal, round, and reactive to light. Conjunctivae and EOM are normal.   Neck: Normal range of motion. Neck supple. No JVD present. No thyromegaly present.   Cardiovascular: Normal rate, regular rhythm, normal heart sounds and intact distal pulses.   No murmur heard.  Pulmonary/Chest: Effort normal and breath sounds normal. No stridor. No respiratory distress. He has no wheezes. He has no rales.   Abdominal: Soft. Bowel sounds are normal. He exhibits no distension and no mass. There is no tenderness. There is no guarding.   Musculoskeletal: Normal range of  motion. He exhibits no edema or tenderness.   No more left breast swelling. He denies tenderness with palp. No palp mass   Lymphadenopathy:     He has no cervical adenopathy.   Neurological: He is alert and oriented to person, place, and time. He has normal reflexes. No cranial nerve deficit.   Skin: Skin is warm and dry. No rash noted. No pallor.   Psychiatric: He has a normal mood and affect.   Nursing note and vitals reviewed.      Assessment:       1. Mixed hyperlipidemia    2. Recurrent major depressive disorder, in partial remission    3. Essential hypertension    4. History of CVA (cerebrovascular accident)    5. Gastroesophageal reflux disease without esophagitis        Plan:     Problem List Items Addressed This Visit     Mixed hyperlipidemia - Primary    Recurrent major depressive disorder, in partial remission    Essential hypertension    Gastroesophageal reflux disease without esophagitis    History of CVA (cerebrovascular accident)        Cont to hold lipitor, will ask for labs from Dr Armijo. Send note to Dr Armijo. Holding lipitor has helped the breast tenderness and swelling. Will need another Statin started due to hx of CVA. Has f/u with Aleksander on Friday. Will discuss then.     Declines flu shot

## 2019-09-20 ENCOUNTER — TELEPHONE (OUTPATIENT)
Dept: ADMINISTRATIVE | Facility: HOSPITAL | Age: 54
End: 2019-09-20

## 2019-09-24 ENCOUNTER — TELEPHONE (OUTPATIENT)
Dept: INTERNAL MEDICINE | Facility: CLINIC | Age: 54
End: 2019-09-24

## 2019-09-24 RX ORDER — ROSUVASTATIN CALCIUM 20 MG/1
20 TABLET, COATED ORAL DAILY
COMMUNITY
End: 2020-02-18 | Stop reason: SDUPTHER

## 2019-09-30 ENCOUNTER — EXTERNAL CHRONIC CARE MANAGEMENT (OUTPATIENT)
Dept: PRIMARY CARE CLINIC | Facility: CLINIC | Age: 54
End: 2019-09-30
Payer: MEDICARE

## 2019-09-30 PROCEDURE — 99490 CHRNC CARE MGMT STAFF 1ST 20: CPT | Mod: S$PBB | Performed by: NURSE PRACTITIONER

## 2019-09-30 PROCEDURE — 99999 PR STA SHADOW: ICD-10-PCS | Mod: PBBFAC,,, | Performed by: NURSE PRACTITIONER

## 2019-09-30 PROCEDURE — 99999 PR STA SHADOW: CPT | Mod: PBBFAC,,, | Performed by: NURSE PRACTITIONER

## 2019-10-29 ENCOUNTER — TELEPHONE (OUTPATIENT)
Dept: FAMILY MEDICINE | Facility: CLINIC | Age: 54
End: 2019-10-29

## 2019-10-29 NOTE — TELEPHONE ENCOUNTER
----- Message from Summer Marvin sent at 10/29/2019 12:49 PM CDT -----  Contact: wife-alexis Ballard  MRN: [id number]  : 1965  PCP: Sana Tinsley  Home Phone      [phone]  Work Phone      Not on file.  Mobile          [phone]      MESSAGE:   Patient wife is calling to see if patient could be seen by . He has swelling of his left breast and no other doctor has done blood work for him to check for anything. He said its very painful. Has been seen at .       [phone]

## 2019-10-30 ENCOUNTER — LAB VISIT (OUTPATIENT)
Dept: LAB | Facility: HOSPITAL | Age: 54
End: 2019-10-30
Attending: NURSE PRACTITIONER
Payer: MEDICARE

## 2019-10-30 ENCOUNTER — OFFICE VISIT (OUTPATIENT)
Dept: INTERNAL MEDICINE | Facility: CLINIC | Age: 54
End: 2019-10-30
Payer: MEDICARE

## 2019-10-30 VITALS
SYSTOLIC BLOOD PRESSURE: 120 MMHG | WEIGHT: 189.38 LBS | OXYGEN SATURATION: 100 % | RESPIRATION RATE: 20 BRPM | HEART RATE: 78 BPM | BODY MASS INDEX: 24.3 KG/M2 | DIASTOLIC BLOOD PRESSURE: 84 MMHG | HEIGHT: 74 IN

## 2019-10-30 DIAGNOSIS — N64.4 BREAST PAIN, LEFT: ICD-10-CM

## 2019-10-30 DIAGNOSIS — N63.0 SWELLING OF BREAST: ICD-10-CM

## 2019-10-30 DIAGNOSIS — N64.4 BREAST PAIN, LEFT: Primary | ICD-10-CM

## 2019-10-30 DIAGNOSIS — I10 ESSENTIAL HYPERTENSION: ICD-10-CM

## 2019-10-30 DIAGNOSIS — K21.9 GASTROESOPHAGEAL REFLUX DISEASE WITHOUT ESOPHAGITIS: ICD-10-CM

## 2019-10-30 LAB
BASOPHILS # BLD AUTO: 0.02 K/UL (ref 0–0.2)
BASOPHILS NFR BLD: 0.5 % (ref 0–1.9)
DIFFERENTIAL METHOD: ABNORMAL
EOSINOPHIL # BLD AUTO: 0.1 K/UL (ref 0–0.5)
EOSINOPHIL NFR BLD: 1.9 % (ref 0–8)
ERYTHROCYTE [DISTWIDTH] IN BLOOD BY AUTOMATED COUNT: 12.1 % (ref 11.5–14.5)
ESTRADIOL SERPL-MCNC: <10 PG/ML (ref 11–44)
FSH SERPL-ACNC: 9.5 MIU/ML (ref 0.95–11.95)
HCT VFR BLD AUTO: 48.1 % (ref 40–54)
HGB BLD-MCNC: 15.5 G/DL (ref 14–18)
IMM GRANULOCYTES # BLD AUTO: 0 K/UL (ref 0–0.04)
IMM GRANULOCYTES NFR BLD AUTO: 0 % (ref 0–0.5)
LH SERPL-ACNC: 4.4 MIU/ML (ref 0.6–12.1)
LYMPHOCYTES # BLD AUTO: 0.8 K/UL (ref 1–4.8)
LYMPHOCYTES NFR BLD: 17.7 % (ref 18–48)
MCH RBC QN AUTO: 29.6 PG (ref 27–31)
MCHC RBC AUTO-ENTMCNC: 32.2 G/DL (ref 32–36)
MCV RBC AUTO: 92 FL (ref 82–98)
MONOCYTES # BLD AUTO: 0.3 K/UL (ref 0.3–1)
MONOCYTES NFR BLD: 6.6 % (ref 4–15)
NEUTROPHILS # BLD AUTO: 3.1 K/UL (ref 1.8–7.7)
NEUTROPHILS NFR BLD: 73.3 % (ref 38–73)
NRBC BLD-RTO: 0 /100 WBC
PLATELET # BLD AUTO: 281 K/UL (ref 150–350)
PMV BLD AUTO: 10 FL (ref 9.2–12.9)
RBC # BLD AUTO: 5.23 M/UL (ref 4.6–6.2)
TESTOST SERPL-MCNC: 566 NG/DL (ref 304–1227)
WBC # BLD AUTO: 4.24 K/UL (ref 3.9–12.7)

## 2019-10-30 PROCEDURE — 36415 COLL VENOUS BLD VENIPUNCTURE: CPT

## 2019-10-30 PROCEDURE — 84403 ASSAY OF TOTAL TESTOSTERONE: CPT

## 2019-10-30 PROCEDURE — 85025 COMPLETE CBC W/AUTO DIFF WBC: CPT

## 2019-10-30 PROCEDURE — 82088 ASSAY OF ALDOSTERONE: CPT

## 2019-10-30 PROCEDURE — 99999 PR PBB SHADOW E&M-EST. PATIENT-LVL IV: CPT | Mod: PBBFAC,,, | Performed by: NURSE PRACTITIONER

## 2019-10-30 PROCEDURE — 99214 OFFICE O/P EST MOD 30 MIN: CPT | Mod: PBBFAC | Performed by: NURSE PRACTITIONER

## 2019-10-30 PROCEDURE — 99999 PR STA SHADOW: CPT | Mod: PBBFAC,,, | Performed by: NURSE PRACTITIONER

## 2019-10-30 PROCEDURE — 99999 PR PBB SHADOW E&M-EST. PATIENT-LVL IV: ICD-10-PCS | Mod: PBBFAC,,, | Performed by: NURSE PRACTITIONER

## 2019-10-30 PROCEDURE — 83002 ASSAY OF GONADOTROPIN (LH): CPT

## 2019-10-30 PROCEDURE — 99214 OFFICE O/P EST MOD 30 MIN: CPT | Mod: S$PBB | Performed by: NURSE PRACTITIONER

## 2019-10-30 PROCEDURE — 82670 ASSAY OF TOTAL ESTRADIOL: CPT

## 2019-10-30 PROCEDURE — 83001 ASSAY OF GONADOTROPIN (FSH): CPT

## 2019-10-30 NOTE — PROGRESS NOTES
[facility] Note    Chief Complaint      Chief Complaint   Patient presents with    pain to left breast     tender and swelling     History of Present Illness      Jett Ballard is a 54 y.o. male who presents today.new to me; known to fellow providers;  .  Patient comes to appointment with c/o breast tenderness     Problem List Items Addressed This Visit        Cardiac/Vascular    Essential hypertension       GI    Gastroesophageal reflux disease without esophagitis      Other Visit Diagnoses     Breast pain, left    -  Primary    Relevant Orders    Follicle stimulating hormone    Luteinizing hormone    Estradiol    Testosterone    CBC auto differential (Completed)    Aldosterone    US Soft Tissue Misc    Swelling of breast              Health Maintenance   Topic Date Due    Lipid Panel  09/17/2020    High Dose Statin  10/30/2020    Aspirin/Antiplatelet Therapy  10/30/2020    Colonoscopy  07/19/2023    TETANUS VACCINE  04/25/2028    Hepatitis C Screening  Completed       Past Medical History:   Diagnosis Date    Hypercholesteremia     Hypertension     Left sided numbness     Leg weakness     Other and unspecified hyperlipidemia     Positional lightheadedness     Stroke 2014    Tremor     Weakness        Past Surgical History:   Procedure Laterality Date    BACK SURGERY      COLONOSCOPY N/A 7/19/2018    Procedure: COLONOSCOPY;  Surgeon: Patric Carrera MD;  Location: [facility];  Service: Endoscopy;  Laterality: N/A;    TONSILLECTOMY         family history includes Cancer in his father; Heart disease in his father.    Social History     Tobacco Use    Smoking status: Never Smoker    Smokeless tobacco: Current User     Types: Snuff    Tobacco comment: 33 yr history of dip   Substance Use Topics    Alcohol use: No     Alcohol/week: 0.0 standard drinks    Drug use: No       Jett Ballard is a 54 y.o. Male known to me from previous acute visit in August for  left breast pain; Mammogram 8/8/19, ER visit 8/11/19, normal mammogram, fatty tissue   Has tried Doxy and ibuprofen ,seen in ER; Dr. Webster requested US but pt reports radiology advised was not indicated with normal MMG per radiology,   Still notes left breast swelling and pain constant. Even tried holding lipitor and changing statin- no difference      Notes few years ago he was treated with prolonged abx and it went away;   1-2 year ago; deneis taking any supplements       Review of Systems   Constitutional: Negative for chills and fever.   HENT: Negative for congestion, postnasal drip and sore throat.    Eyes: Negative for photophobia.   Respiratory: Negative for cough, chest tightness and shortness of breath.    Cardiovascular: Negative for chest pain, palpitations and leg swelling.   Gastrointestinal: Negative for abdominal distention, abdominal pain, blood in stool and vomiting.   Genitourinary: Negative for dysuria, flank pain and hematuria.   Musculoskeletal: Negative for back pain and neck pain.        Left breast pain and swelling   Skin: Negative for color change, pallor, rash and wound.   Neurological: Negative for dizziness, seizures, facial asymmetry, speech difficulty, weakness, light-headedness, numbness and headaches.   Hematological: Does not bruise/bleed easily.   Psychiatric/Behavioral: Negative for agitation and suicidal ideas. The patient is not nervous/anxious.         Outpatient Encounter Medications as of 10/30/2019   Medication Sig Dispense Refill    amlodipine-benazepril 5-10 mg (LOTREL) 5-10 mg per capsule Take 1 capsule by mouth 2 (two) times daily.       aspirin 81 MG Chew Take 162 mg by mouth once daily.       pantoprazole (PROTONIX) 40 MG tablet Take 40 mg by mouth once daily.  11    rosuvastatin (CRESTOR) 20 MG tablet Take 20 mg by mouth once daily.      triamcinolone acetonide 0.1% (KENALOG) 0.1 % cream Apply topically 2 (two) times daily. 80 g 0    ibuprofen (ADVIL,MOTRIN)  "800 MG tablet Take 1 tablet (800 mg total) by mouth every 6 (six) hours as needed for Pain. (Patient not taking: Reported on 10/30/2019) 20 tablet 0     No facility-administered encounter medications on file as of 10/30/2019.         Review of patient's allergies indicates:   Allergen Reactions    Adhesive Rash    Latex, natural rubber Rash       Physical Exam      Vital Signs  Pulse: 78  Resp: 20  SpO2: 100 %  BP: 120/84  Pain Score:   8  Pain Loc: Chest  Height and Weight  Height: 6' 2" (188 cm)  Weight: 85.9 kg (189 lb 6 oz)  BSA (Calculated - sq m): 2.12 sq meters  BMI (Calculated): 24.4  Weight in (lb) to have BMI = 25: 194.3]    @Physical Exam   Constitutional: He is oriented to person, place, and time. He appears well-developed and well-nourished.   HENT:   Head: Normocephalic and atraumatic.   Cardiovascular: Normal rate, regular rhythm, normal heart sounds and intact distal pulses.   No murmur heard.  _+ Left breast swelling and tenderness, no mass palpated     Pulmonary/Chest: Effort normal and breath sounds normal. No stridor. No respiratory distress. He has no wheezes. He has no rales.   Abdominal: Soft. Bowel sounds are normal. He exhibits no distension and no mass. There is no tenderness. There is no guarding.   Musculoskeletal: He exhibits tenderness ( ). He exhibits no edema or deformity.   Chronic neck pain right little finger restricted   Neurological: He is alert and oriented to person, place, and time. He has normal reflexes.   Skin: Skin is warm and dry. Capillary refill takes less than 2 seconds.        Nursing note and vitals reviewed.        Laboratory:  CBC:  Recent Labs   Lab Result Units 10/30/19  1040   WBC K/uL 4.24   RBC M/uL 5.23   Hemoglobin g/dL 15.5   Hematocrit % 48.1   Platelets K/uL 281   Mean Corpuscular Volume fL 92   Mean Corpuscular Hemoglobin pg 29.6   Mean Corpuscular Hemoglobin Conc g/dL 32.2     CMP:  No results for input(s): GLU, CALCIUM, ALBUMIN, PROT, NA, K, CO2, CL, " BUN, ALKPHOS, ALT, AST, BILITOT in the last 2160 hours.    Invalid input(s): CREATININ  URINALYSIS:  No results for input(s): COLORU, CLARITYU, SPECGRAV, PHUR, PROTEINUA, GLUCOSEU, BILIRUBINCON, BLOODU, WBCU, RBCU, BACTERIA, MUCUS, NITRITE, LEUKOCYTESUR, UROBILINOGEN, HYALINECASTS in the last 2160 hours.   LIPIDS:  Recent Labs   Lab Result Units 09/17/19   HDL mg/dL 64   Triglycerides mg/dL 93   Non HDL Chol. (LDL+VLDL)  161   Total Cholesterol/HDL Ratio  3.5     TSH:  No results for input(s): TSH in the last 2160 hours.  A1C:  No results for input(s): HGBA1C in the last 2160 hours.    Radiology:  18/2019The breast is almost entirely fatty. There is no evidence of suspicious masses, microcalcifications or architectural distortion.     Impression:  No mammographic evidence of malignancy.        Assessment/Plan     Jett Ballard is a 54 y.o.male with:    1. Breast pain, left  - Follicle stimulating hormone; Future  - Luteinizing hormone; Future  - Estradiol; Future  - Testosterone; Future  - CBC auto differential; Future  - Aldosterone; Future  - US Soft Tissue Misc; Future    2. Swelling of breast    3. Essential hypertension    4. Gastroesophageal reflux disease without esophagitis      -Continue current medications and maintain follow up with specialists.  Return to clinic in  7-10 days  Consider ABX course if US negative         Tisha Noble MD  [facility] [place]

## 2019-10-31 ENCOUNTER — EXTERNAL CHRONIC CARE MANAGEMENT (OUTPATIENT)
Dept: PRIMARY CARE CLINIC | Facility: CLINIC | Age: 54
End: 2019-10-31
Payer: MEDICARE

## 2019-10-31 ENCOUNTER — HOSPITAL ENCOUNTER (OUTPATIENT)
Dept: RADIOLOGY | Facility: HOSPITAL | Age: 54
Discharge: HOME | End: 2019-10-31
Attending: NURSE PRACTITIONER
Payer: MEDICARE

## 2019-10-31 DIAGNOSIS — N64.4 BREAST PAIN, LEFT: ICD-10-CM

## 2019-10-31 PROCEDURE — 76999 ECHO EXAMINATION PROCEDURE: CPT | Mod: TC

## 2019-10-31 PROCEDURE — 99999 PR STA SHADOW: CPT | Mod: PBBFAC,,, | Performed by: NURSE PRACTITIONER

## 2019-10-31 PROCEDURE — 99490 CHRNC CARE MGMT STAFF 1ST 20: CPT | Mod: PBBFAC | Performed by: NURSE PRACTITIONER

## 2019-10-31 PROCEDURE — 99999 PR STA SHADOW: ICD-10-PCS | Mod: PBBFAC,,, | Performed by: NURSE PRACTITIONER

## 2019-11-01 ENCOUNTER — TELEPHONE (OUTPATIENT)
Dept: INTERNAL MEDICINE | Facility: CLINIC | Age: 54
End: 2019-11-01

## 2019-11-01 NOTE — TELEPHONE ENCOUNTER
Spoke with patient and informed him of ultrasound reports. Also informed pt that Tisha does not think an abx is appropriate. Wants to consult with Sana about sending him to a breast specialist since the pain is occurring with no explanation for it. Pt verbalized understanding.

## 2019-11-01 NOTE — TELEPHONE ENCOUNTER
----- Message from Jennifer Eid sent at 2019  4:11 PM CDT -----  Contact: Self  Jett Ballard  MRN: [id number]  : 1965  PCP: Sana Tinsley  Home Phone      [phone]  Work Phone      Not on file.  Mobile          [phone]    MESSAGE:   Calling to find out if antibiotics were going to be called in.  Tisah sent him for an ultrasound during the week and he hasn't heard back yet. Please call to advise.    Pharmacy: [facility] #[id number] - [place] - [id number]    Phone: [phone]

## 2019-11-04 LAB — ALDOST SERPL-MCNC: 3.7 NG/DL

## 2019-11-06 ENCOUNTER — OFFICE VISIT (OUTPATIENT)
Dept: INTERNAL MEDICINE | Facility: CLINIC | Age: 54
End: 2019-11-06
Payer: MEDICARE

## 2019-11-06 VITALS
DIASTOLIC BLOOD PRESSURE: 64 MMHG | WEIGHT: 190.25 LBS | OXYGEN SATURATION: 97 % | BODY MASS INDEX: 24.42 KG/M2 | SYSTOLIC BLOOD PRESSURE: 114 MMHG | HEART RATE: 88 BPM | RESPIRATION RATE: 16 BRPM | HEIGHT: 74 IN

## 2019-11-06 DIAGNOSIS — I10 HYPERTENSION, UNSPECIFIED TYPE: ICD-10-CM

## 2019-11-06 DIAGNOSIS — N64.4 BREAST TENDERNESS IN MALE: Primary | ICD-10-CM

## 2019-11-06 PROCEDURE — 99999 PR PBB SHADOW E&M-EST. PATIENT-LVL III: CPT | Mod: PBBFAC,,, | Performed by: NURSE PRACTITIONER

## 2019-11-06 PROCEDURE — 99999 PR STA SHADOW: ICD-10-PCS | Mod: PBBFAC,,, | Performed by: NURSE PRACTITIONER

## 2019-11-06 PROCEDURE — 99999 PR STA SHADOW: CPT | Mod: PBBFAC,,, | Performed by: NURSE PRACTITIONER

## 2019-11-06 PROCEDURE — 99213 OFFICE O/P EST LOW 20 MIN: CPT | Mod: PBBFAC,27 | Performed by: NURSE PRACTITIONER

## 2019-11-06 PROCEDURE — 99214 OFFICE O/P EST MOD 30 MIN: CPT | Mod: S$PBB | Performed by: NURSE PRACTITIONER

## 2019-11-06 RX ORDER — BENAZEPRIL HYDROCHLORIDE 20 MG/1
20 TABLET ORAL DAILY
Qty: 30 TABLET | Refills: 0 | Status: SHIPPED | OUTPATIENT
Start: 2019-11-06 | End: 2019-11-29 | Stop reason: SDUPTHER

## 2019-11-06 NOTE — PROGRESS NOTES
Subjective:       Patient ID: Jett Ballard is a 54 y.o. male.    Chief Complaint: Follow-up    HPI: Pt presents to clinic today for continued left breast tenderness and swelling. Tisha did a bunch of labs for w/u. All labs WNL. Mammo and u./s normal as well. He reports that at times more swollen. Always tender.     We have tried to stop the statin x 4 week with no relief in breast tenderness. I have looked up all other medication. Gynecomastia is also reported as adverse reaction with amlodipine. It reports that he has been on lotrel x 2 years  Review of Systems   Constitutional: Negative for chills and fever.   HENT: Negative for congestion, postnasal drip and sore throat.    Eyes: Negative for photophobia.   Respiratory: Negative for chest tightness and shortness of breath.    Cardiovascular: Negative for chest pain.   Gastrointestinal: Negative for abdominal distention, abdominal pain, blood in stool and vomiting.   Genitourinary: Negative for dysuria, flank pain and hematuria.   Musculoskeletal: Negative for back pain.   Skin: Negative for pallor.   Neurological: Negative for dizziness, seizures, facial asymmetry, speech difficulty and numbness.   Hematological: Does not bruise/bleed easily.   Psychiatric/Behavioral: Negative for agitation and suicidal ideas. The patient is not nervous/anxious.        Objective:      Physical Exam   Constitutional: He is oriented to person, place, and time. He appears well-developed and well-nourished.   HENT:   Head: Normocephalic and atraumatic.   Nose: Nose normal.   Mouth/Throat: Oropharynx is clear and moist.   Eyes: Pupils are equal, round, and reactive to light. Conjunctivae and EOM are normal.   Neck: Normal range of motion. Neck supple. No JVD present. No thyromegaly present.   Cardiovascular: Normal rate, regular rhythm, normal heart sounds and intact distal pulses.   No murmur heard.  Pulmonary/Chest: Effort normal and breath sounds normal. No stridor. No  respiratory distress. He has no wheezes.   Abdominal: Soft. Bowel sounds are normal. He exhibits no distension and no mass. There is no tenderness. There is no guarding.   Musculoskeletal: Normal range of motion. He exhibits tenderness. He exhibits no edema.   Left breast slightly swollen but tender to palp   Lymphadenopathy:     He has no cervical adenopathy.   Neurological: He is alert and oriented to person, place, and time. He has normal reflexes. No cranial nerve deficit.   Skin: Skin is warm and dry. No rash noted. No pallor.   Psychiatric: He has a normal mood and affect.   Nursing note and vitals reviewed.      Assessment:       1. Breast tenderness in male    2. Hypertension, unspecified type        Plan:     Problem List Items Addressed This Visit     None      Visit Diagnoses     Breast tenderness in male    -  Primary    Relevant Orders    Ambulatory Referral to Breast Surgery    Hypertension, unspecified type        Relevant Medications    benazepril (LOTENSIN) 20 MG tablet        Will stop amlodipine as <1% have complained of gynecomastia , increase ACE while d/c that. If pain persist will need f/u with [facility]. If pain resolves will need to f/u bp to make sure increase ACE controlling bp

## 2019-11-06 NOTE — PROGRESS NOTES
Patient notified Kristina at [facility] will contact him with appointment information. Appointment will be scheduled in one month per Sana Tinsley NP. Patient instructed to contact office if appointment information has not been received in one week. Patient verbalized understanding with no questions or concerns.

## 2019-11-10 ENCOUNTER — PATIENT OUTREACH (OUTPATIENT)
Dept: ADMINISTRATIVE | Facility: OTHER | Age: 54
End: 2019-11-10

## 2019-11-13 ENCOUNTER — OFFICE VISIT (OUTPATIENT)
Dept: NEUROLOGY | Facility: CLINIC | Age: 54
End: 2019-11-13
Payer: MEDICARE

## 2019-11-13 VITALS
WEIGHT: 194.56 LBS | HEART RATE: 78 BPM | BODY MASS INDEX: 24.97 KG/M2 | HEIGHT: 74 IN | SYSTOLIC BLOOD PRESSURE: 126 MMHG | RESPIRATION RATE: 16 BRPM | DIASTOLIC BLOOD PRESSURE: 84 MMHG

## 2019-11-13 DIAGNOSIS — Z86.73 HISTORY OF CVA (CEREBROVASCULAR ACCIDENT): ICD-10-CM

## 2019-11-13 DIAGNOSIS — E78.2 MIXED HYPERLIPIDEMIA: ICD-10-CM

## 2019-11-13 DIAGNOSIS — I10 ESSENTIAL HYPERTENSION: ICD-10-CM

## 2019-11-13 DIAGNOSIS — M48.02 CERVICAL STENOSIS OF SPINAL CANAL: Primary | Chronic | ICD-10-CM

## 2019-11-13 DIAGNOSIS — G44.229 CHRONIC TENSION-TYPE HEADACHE, NOT INTRACTABLE: ICD-10-CM

## 2019-11-13 DIAGNOSIS — R29.898 LEFT LEG WEAKNESS: ICD-10-CM

## 2019-11-13 DIAGNOSIS — I69.90 LATE EFFECTS OF CVA (CEREBROVASCULAR ACCIDENT): ICD-10-CM

## 2019-11-13 PROBLEM — M54.2 NECK PAIN: Status: RESOLVED | Noted: 2019-01-22 | Resolved: 2019-11-13

## 2019-11-13 PROBLEM — G43.109 COMPLICATED MIGRAINE: Status: RESOLVED | Noted: 2019-04-25 | Resolved: 2019-11-13

## 2019-11-13 PROBLEM — R41.3 MEMORY LOSS: Status: RESOLVED | Noted: 2018-07-10 | Resolved: 2019-11-13

## 2019-11-13 PROBLEM — G47.00 INSOMNIA: Status: RESOLVED | Noted: 2018-07-10 | Resolved: 2019-11-13

## 2019-11-13 PROCEDURE — 99999 PR STA SHADOW: CPT | Mod: PBBFAC,,, | Performed by: NURSE PRACTITIONER

## 2019-11-13 PROCEDURE — 99999 PR PBB SHADOW E&M-EST. PATIENT-LVL III: ICD-10-PCS | Mod: PBBFAC,,, | Performed by: NURSE PRACTITIONER

## 2019-11-13 PROCEDURE — 99214 OFFICE O/P EST MOD 30 MIN: CPT | Mod: S$PBB | Performed by: NURSE PRACTITIONER

## 2019-11-13 PROCEDURE — 99999 PR PBB SHADOW E&M-EST. PATIENT-LVL III: CPT | Mod: PBBFAC,,, | Performed by: NURSE PRACTITIONER

## 2019-11-13 PROCEDURE — 99213 OFFICE O/P EST LOW 20 MIN: CPT | Mod: PBBFAC | Performed by: NURSE PRACTITIONER

## 2019-11-13 NOTE — PROGRESS NOTES
HPI: Jett Ballard is a 54 y.o. male with PMHx of Right thalamic CVA (5/15) and was previously followed by an outside neurologist, Dr Lorenzo for tremors and memory loss since then. He had a spell of slurred speech and bilateral weakness/facial tingling in 5/2018. Orthostatic complaints and syncope prior, which resolved with medication adjustments per Cardiology. Admit to [facility] in 6/2018 for headache, followed by unwitnessed syncope, memory loss, and questionable facial droop per partner. Head CT unremarkable; polypharmacy was believed to contribute to some of his complaints. Vertigo and gait imbalance in 1/2019 with unremarkable MRI Brain. He was seen in the [place] ER on 4/25/2019-arrive via ambulance for headache and left facial and left arm paresthesias.Telestroke was consulted. Head CT was unremarkable. Dr. Luis diagnosed him with migraine.  He has HTN, HLD, GERD, and cervical stenosis.     Patient presents today for a follow up visit. MRI C-spine was repeated at his last visit in 5/2019, due to ongoing cervical radicular complaints. Some progression of his moderate to severe degenerative changes, and mild to moderate stenosis. He was referred to pain management for his complaints, but was unable to proceed with this, secondary to insurance; however, his neck pain is overall improved since his last visit.     He has been seeing PCP for complaint of gynecomastia, suspected to be medication related. MMG unremarkable.     No tremors at this time. Believed to be anxiety related at that time.     Anxiety is resolved. Dizziness is resolved.     He saw an orthopedist for the trigger finger to the 5th right finger.     He drives without issue. Functioning overall well, despite CVA history.     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     Review of Systems   Constitutional: Negative for fever.   HENT: Negative for nosebleeds.    Eyes: Negative for double vision.   Respiratory: Negative for  hemoptysis.    Cardiovascular: Negative for leg swelling.   Gastrointestinal: Negative for blood in stool.   Genitourinary: Negative for hematuria.        Penile pain   Musculoskeletal: Positive for neck pain. Negative for falls.   Skin: Negative for rash.   Neurological: Positive for tremors, sensory change and headaches. Negative for dizziness, tingling, speech change and loss of consciousness.   Psychiatric/Behavioral: Positive for memory loss. Negative for depression. The patient is not nervous/anxious and does not have insomnia.        I have reviewed all of this patient's past medical and surgical histories as well as family and social histories and active allergies and medications as documented in the electronic medical record.    Exam:  Gen Appearance, well developed/nourished in no apparent distress  CV: 2+ distal pulses with no edema or swelling  Neuro:  MS: Awake, alert, Sustains attention but mildly slurred speech since CVA is noted. Recent/remote memory intact, Language is full to spontaneous speech/comprehension. Fund of Knowledge is full  CN: Optic discs are flat with normal vasculature, PERRL, Extraoccular movements and visual fields are full; no nygstagmus. Observed left lower lid blepharospasm today. No inducible diplopia or ptosis today.  Normal facial sensation and strength, Hearing symmetric, Tongue and Palate are midline and strong. Shoulder Shrug symmetric and strong.  Motor: Normal bulk, tone, no abnormal movements at rest, but tremor of both hands, L>R, with activity and movement. 5/5 strength bilateral upper/lower extremities with 2+ reflexes and no clonus  Right fifth finger is contracted.   Sensory: symmetric to light touch, pain, temp, and vibration except decreased to temp on the left side chronically.  Romberg positive.   Cerebellar: Finger-nose,Heal-shin, Rapid alternating movements intact  Gait: Normal stance  MSK survey: trigger finger right 5th digit    Imagin2019 MRI  L-spine:   1. New left-sided disc protrusion at C6-C7 could affect the left C7 spinal nerve with radiculopathy symptoms.  There is slight remodeling of the cord substance without abnormal cord signal at this level.  2. Persistent mild/moderate spinal stenosis at C4-C5 with foraminal stenosis on the right that could affect the right C5 spinal nerve clinically.  This has progressed in the interval.  3. Minimal spinal stenosis at C5-C6 with mild bilateral foraminal stenosis, similar to prior.    4/25/19 CT Head:   No CT evidence of acute intracranial abnormality.    1/22/2019 MRI Brain:   Age-appropriate generalized cerebral volume loss.  No abnormal diffusion restriction to suggest an acute infarction.  No abnormal gradient susceptibility is identified.  Few scattered foci of T2/FLAIR signal abnormality in the supratentorial white matter in keeping with chronic microvascular ischemic disease of a mild degree.  The ventricles are stable in size and configuration without hydrocephalus.  No extra-axial fluid collections.  Expected intracranial flow voids are demonstrated.  The visualized paranasal sinuses including the mastoid air cells are clear.      Impression       Age-appropriate generalized cerebral volume loss with mild chronic microvascular ischemic change.  No evidence for an acute infarction.       6/2018 MRI Brain:   COMPARISON:  05/30/2018    FINDINGS:  There is no evidence for acute intracranial hemorrhage or sulcal effacement.  The ventricles are normal in size without hydrocephalus.  There is no midline shift or mass effect.  Visualized paranasal sinuses and mastoid air cells are clear.   Impression       Unremarkable noncontrast CT head specifically without evidence for acute intracranial hemorrhage.  Clinical correlation and further evaluation as warranted.       5/2018 MRI Brain:  COMPARISON:  05/26/2018    FINDINGS:  Intracranial compartment:    Ventricles and sulci are normal in size for age without  evidence of hydrocephalus. No extra-axial blood or fluid collections.    There are few scattered foci of T2/FLAIR signal abnormality in the supratentorial white matter in keeping with chronic microvascular ischemic disease of a mild degree.  No mass lesion, acute hemorrhage, edema or acute infarct. No abnormal enhancement.    Normal vascular flow voids are preserved.    Skull/extracranial contents (limited evaluation): Bone marrow signal intensity is normal.   Impression       Age-appropriate generalized cerebral volume loss with mild chronic microvascular ischemic change.  No evidence for an acute infarction or enhancing lesion.     3/2017 CT head: No acute intracranial process.   No significant change.    2/2017 MRI brain:   1.  No MRI evidence of an acute intracranial abnormality.  2.  Minimal stable early small vessel ischemic changes.    4/2016 MRI Brain: N no evidence of acute infarction.  No appreciable change as compared to the MRI 06/10/2015 with several small nonspecific T2 hyperintensities evident in the white matter which may reflect small nonspecific areas of gliosis or perhaps very mild microvascular ischemic change including subtle somewhat linear appearing T2 hyperintense intensity adjacent to head and body of caudate on the left.    MRI Brain 2015: No evidence of acute infarction.      Small nonspecific area of T2 hyperintensity adjacent to left head of caudate may reflect nonspecific area of gliosis with additional punctate focus nonspecific T2 hyperintensity in the left parietal subcortical white matter    2015 MRA brain: Punctate acute/subacute infarction involving the right thalamus without associated hemorrhage, mass-effect or midline shift.    Age-appropriate generalized cerebral volume loss with mild degree of chronic microvascular ischemic disease    2015 CTA head:   Temporal evolution of the previously noted punctate right thalamic infarction with minimal hypodensity now visualized in this  location.  Otherwise, unremarkable  CT of the head specifically without evidence for acute hemorrhage or abnormal parenchymal   enhancement.    Unremarkable CTA of the head specifically without evidence for focal stenosis or intracranial aneurysm.    5/2015 Carotid US:   #1. Findings consistent with less than 50% stenosis in the Right carotid artery bulb.    #2. Findings consistent with less than 50% stenosis in the Left carotid artery bulb.    11/2016 CT C-spine:      Narrative     Axial images were obtained through the cervical spine with coronal and sagittal reformats. MRI is more sensitive and specific for degenerative changes if desired MR could be performed.    Densities noted within each external auditory canal suggestive of cerumen.    No obvious fracture or dislocation is noted.    Several normal size cervical nodes appear to be present bilaterally.    Vertebral body alignment appears adequate. Vertebral body heights appear well-preserved. Minimal intervertebral disc height narrowing is noted at the C4-C5 C5-C6 and C6-C7 levels    At the C2-C3 level, uncovertebral spurring is noted bilaterally. Mild right neural foraminal narrowing is noted without significant left neural foraminal narrowing and no significant central canal stenosis.    At the C3-C4 level, uncovertebral spurring appears to be present left greater than right. Mild left neural foraminal narrowing is noted without significant right neural foraminal stenosis and with minimal central canal narrowing    At the C4-C5 level, broad-based disc osteophyte protrusion appears to be present of 4 mm with moderate central canal stenosis to 6 mm in the anterior cord contact suspected. Moderate right neural foraminal stenosis is noted with probable nerve root contact. Mild left neural foraminal stenosis is noted. Facet arthropathy appears to be present bilaterally    At the C5-C6 level, disc osteophyte protrusion is suspected paracentral to the left of 4 mm.  Beam hardening hinders ideal evaluation. Moderate central canal stenosis is suspected to 7 mm with anterior cord contact suspected. Spurring is noted towards the left neural foramen with moderate left neural foraminal stenosis and probable contact of the exiting nerve root. Mild right neural foraminal stenosis is noted. Facet arthropathy appears to be present bilaterally.    At the C6-C7 level, disc protrusion appears to be present paracentric to the left of 5 mm. Moderate to severe central canal narrowing is suspected but this  is difficult to measure secondary to beam hardening. MRI may be of use. Moderate left neural foraminal stenosis is suspected with mild right-sided neural foraminal stenosis.    At the C7-T1 level, beam hardening hinders the evaluation without significant central canal stenosis or neural foraminal stenosis.      Impression         1. No obvious fracture or dislocation is noted.    2. Disc protrusions are suspected at several levels as described above with central canal stenosis particularly at the C4-C5 level, C6-C7 level and C5-C6 level. MRI could better evaluate degenerative changes if necessary     9/2015 EEG: IMPRESSION:   This is a normal awake and drowsy EEG. It is important to note that patients with epilepsy may have a normal EEG. Clinical correlation is therefore necessary.    EMG 2016:   This is a normal study of bilateral upper and lower extremities without significant evidence of neuropathy or radiculopathy.    2018 CT head:   Unremarkable noncontrast CT head specifically without evidence for acute intracranial hemorrhage. Further evaluation as warrented clinically.    2.2017 MRI brain:   1.  No MRI evidence of an acute intracranial abnormality.  2.  Minimal stable early small vessel ischemic changes.    5/2018 CT head:   No acute intracranial abnormality.    4/2016 MRI C spine:   The study is significantly degraded by motion artifact with bulging of the C4-5 disc suspected  resulting in effacement of ventral subarachnoid space and with mild/moderate flattening of the ventral cord and overall mild degree of canal stenosis.  Small left paracentral protrusion of the C6-7 disc noted with effacement of ventral subarachnoid space and question of mild flattening of the left ventral cord a low with mild bulging of the C5-6 disc suspected.    Xray right finger noted (subluxation deformity)    Labs:   5/2018 labs reviewed/ UDS negative  1/2019 MG panel negative, CBC, CMP, TSH, T4, folate, B12 all unremarkable  9/2019 's prior to starting Crestor.     Assessment/Plan: Jett Ballard is a 54 y.o. male with PMHx of Right thalamic CVA (5/15) and was previously followed by an outside neurologist, Dr Lorenzo for tremors and memory loss since then. He had a spell of slurred speech and bilateral weakness/facial tingling in 5/2018. Orthostatic complaints and syncope prior, which resolved with medication adjustments per Cardiology. Admit to [facility] in 6/2018 for headache, followed by unwitnessed syncope, memory loss, and questionable facial droop per partner. Head CT unremarkable; polypharmacy was believed to contribute to some of his complaints. He has HTN, HLD, and cervical stenosis. Dizziness and gait imbalance in 1/2019 with unremarkable MRI Brain.     I recommend:  1. He may return to work as a . Given the degenerative changes to his C-spine, I would not recommend a job that requires heavy lifting, as this can aggravate his neck pain; however, cervical complaints are resolved.   2. Note moderate C-spine stenosis. [facility] did not accept his insurance prior. Could consider a provider in [place] if this is needed at any point, though he is currently doing well.   -Gabapentin ineffective for cervical complaints.   -Unable to attend PT, due to cost. Saw pain management prior at [facility], but never tried spinal injections.    3. Tremors resolved-believed to be  anxiety related prior. He stopped Primidone and Cogentin for his hand tremors, due to reported lack of efficacy, and Gabapentin was ineffective.   4. No longer seeing Psychiatry as anxiety is resolved. He failed Lexapro. Could not tolerate Cymbalta prior, due to side effects. Zoloft worsened his mood prior. Depakote is listed on his medication history, though it is unknown who prescribed this to him and for what reason.  5. Prior memory loss suspected to be anxiety related. Could not afford Neuropsych testing ordered in 2018.   6. Prior dizziness suspected to be anxiety related. Workup unremarkable. Could not attend PT, due to cost.   7. Continue prn Fioricet for episodic migraine, which is effective. Limit to 2 days per week to prevent medication rebound.   8. Prior-Advised to change positions slowly to reduce orthostatic complaints. Resolved since med adjustments per Cardiology. Never completed EEG per PCP, due to cost concerns; however, I do not suspect his episodes of dizziness to be seizure related.   9. He may continue to drive without restriction-no incidents.   10. Goal LDL for CVA prevention is less than 100 in this patient. LDL is in 160's, but Crestor started since then.   11. Also for CVA prevention: continue ASA started since first event and anti-HTN meds    FU 6 months

## 2019-11-29 DIAGNOSIS — I10 HYPERTENSION, UNSPECIFIED TYPE: ICD-10-CM

## 2019-11-29 NOTE — TELEPHONE ENCOUNTER
----- Message from Fabiola Springer sent at 2019 11:56 AM CST -----  Contact: self   Jett Ballard  MRN: [id number]  : 1965  PCP: Sana Tinsley  Home Phone      [phone]  Work Phone      Not on file.  Mobile          [phone]    MESSAGE:   Speak to nurse regarding to pain to left breast.    Phone # [phone]    Pharmacy - [facility]/pharmacy #[id number] - [id number]

## 2019-11-29 NOTE — TELEPHONE ENCOUNTER
Spoke with patient and he stated that he is still having lpain to his left breast with swelling. He has an appt with the [facility] on 12/6/2019. Wanted ibuprofen 800 mg called in. Informed him that Sana is not in office until Monday. Instructed the patient to get OTC Ibuprofen 200 mg and to take 4 of them to equal 800 mg. Patient got a little upset and I informed him again that Sana was not in office and that he can do OTC Ibuprofen 200 mg and take 4 and that equals the Ibuprofen 800 mg. Patient verbalized understanding and hung up.

## 2019-11-30 ENCOUNTER — EXTERNAL CHRONIC CARE MANAGEMENT (OUTPATIENT)
Dept: PRIMARY CARE CLINIC | Facility: CLINIC | Age: 54
End: 2019-11-30
Payer: MEDICARE

## 2019-11-30 PROCEDURE — 99490 CHRNC CARE MGMT STAFF 1ST 20: CPT | Mod: S$PBB | Performed by: NURSE PRACTITIONER

## 2019-11-30 PROCEDURE — 99490 CHRNC CARE MGMT STAFF 1ST 20: CPT | Mod: PBBFAC | Performed by: NURSE PRACTITIONER

## 2019-11-30 PROCEDURE — 99999 PR STA SHADOW: CPT | Mod: PBBFAC,,, | Performed by: NURSE PRACTITIONER

## 2019-11-30 PROCEDURE — 99999 PR STA SHADOW: ICD-10-PCS | Mod: PBBFAC,,, | Performed by: NURSE PRACTITIONER

## 2019-12-02 RX ORDER — BENAZEPRIL HYDROCHLORIDE 20 MG/1
TABLET ORAL
Qty: 30 TABLET | Refills: 0 | Status: SHIPPED | OUTPATIENT
Start: 2019-12-02 | End: 2019-12-31 | Stop reason: SDUPTHER

## 2019-12-02 RX ORDER — IBUPROFEN 800 MG/1
800 TABLET ORAL 3 TIMES DAILY PRN
Qty: 30 TABLET | Refills: 0 | Status: SHIPPED | OUTPATIENT
Start: 2019-12-02 | End: 2020-02-18

## 2019-12-06 ENCOUNTER — OFFICE VISIT (OUTPATIENT)
Dept: SURGERY | Facility: CLINIC | Age: 54
End: 2019-12-06
Payer: MEDICARE

## 2019-12-06 VITALS
BODY MASS INDEX: 25.28 KG/M2 | DIASTOLIC BLOOD PRESSURE: 91 MMHG | SYSTOLIC BLOOD PRESSURE: 137 MMHG | HEART RATE: 98 BPM | WEIGHT: 197 LBS | TEMPERATURE: 99 F | HEIGHT: 74 IN

## 2019-12-06 DIAGNOSIS — N64.4 MASTODYNIA OF LEFT BREAST: Primary | ICD-10-CM

## 2019-12-06 PROCEDURE — 99202 PR OFFICE/OUTPT VISIT, NEW, LEVL II, 15-29 MIN: ICD-10-PCS | Mod: S$PBB,ICN,CMP, | Performed by: PHYSICIAN ASSISTANT

## 2019-12-06 PROCEDURE — 99202 OFFICE O/P NEW SF 15 MIN: CPT | Mod: S$PBB,ICN,CMP, | Performed by: PHYSICIAN ASSISTANT

## 2019-12-06 PROCEDURE — 99213 OFFICE O/P EST LOW 20 MIN: CPT | Mod: PBBFAC | Performed by: PHYSICIAN ASSISTANT

## 2019-12-06 PROCEDURE — 99999 PR PBB SHADOW E&M-EST. PATIENT-LVL III: ICD-10-PCS | Mod: PBBFAC,,, | Performed by: PHYSICIAN ASSISTANT

## 2019-12-06 PROCEDURE — 99999 PR PBB SHADOW E&M-EST. PATIENT-LVL III: CPT | Mod: PBBFAC,,, | Performed by: PHYSICIAN ASSISTANT

## 2019-12-06 RX ORDER — DICLOFENAC SODIUM 10 MG/G
2 GEL TOPICAL 4 TIMES DAILY
Qty: 1 TUBE | Refills: 1 | Status: SHIPPED | OUTPATIENT
Start: 2019-12-06 | End: 2020-02-18

## 2019-12-06 NOTE — PROGRESS NOTES
[facility]  [facility] [place]  12/6/2019      REFERRING PROVIDER: Sana Tinsley NP  [address] [place] [address]    SUBJECTIVE:   Mr. Jett Ballard is a 54 y.o. male who presents today complaining of LEFT breast pain.    History of Present Illness:  Patient states he has had pain at his LEFT breast for the past 3 months.  He describes it as constant and worsening over the past few weeks.  Putting a pillow underneath his left arm sometimes helps but 800 mg of Ibuprofen provides no relief.  Patient states he had this similar pain about 3 years ago and it went away with treatment of antibiotics.  His caffeine intake consists of 2-3 cokes per day.  He denies any intake of ETOH and recent estradiol lab was low (<10).      On 8/13/19 he had a diagnostic mammogram of the left breast that showed nothing concerning (BIRADS 1, negative).  A focused ultrasound on 10/31/19 also showed no abnormalities in the left breast that could be attributed to his pain.      Past Medical History:   Diagnosis Date    Hypercholesteremia     Hypertension     Left sided numbness     Leg weakness     Other and unspecified hyperlipidemia     Positional lightheadedness     Stroke 2014    Tremor     Weakness      Past Surgical History:   Procedure Laterality Date    BACK SURGERY      COLONOSCOPY N/A 7/19/2018    Procedure: COLONOSCOPY;  Surgeon: Patric Carrera MD;  Location: [facility];  Service: Endoscopy;  Laterality: N/A;    TONSILLECTOMY       Social History     Socioeconomic History    Marital status: Single     Spouse name: Not on file    Number of children: Not on file    Years of education: 11    Highest education level: Not on file   Occupational History    Not on file   Social Needs    Financial resource strain: Not on file    Food insecurity:     Worry: Not on file     Inability: Not on file    Transportation needs:     Medical: Not on file     Non-medical: Not on file   Tobacco Use     Smoking status: Never Smoker    Smokeless tobacco: Current User     Types: Snuff    Tobacco comment: 33 yr history of dip   Substance and Sexual Activity    Alcohol use: No     Alcohol/week: 0.0 standard drinks    Drug use: No    Sexual activity: Yes     Partners: Female     Birth control/protection: None   Lifestyle    Physical activity:     Days per week: Not on file     Minutes per session: Not on file    Stress: Not on file   Relationships    Social connections:     Talks on phone: Not on file     Gets together: Not on file     Attends [employer] service: Not on file     Active member of club or organization: Not on file     Attends meetings of clubs or organizations: Not on file     Relationship status: Not on file   Other Topics Concern    Not on file   Social History Narrative    Not on file     Review of patient's allergies indicates:   Allergen Reactions    Adhesive Rash    Latex, natural rubber Rash      Family History: He denies any family history of breast or ovarian cancer.  Denies any other family history of cancer.    Review of Systems: Denies any chest pain or shortness of breath.  Denies any fever or chills.  See HPI/ Interval History for other systems reviewed.    OBJECTIVE:   Vitals:    12/06/19 1019   BP: (!) 137/91   Pulse: 98   Temp: 98.7 °F (37.1 °C)      Physical Exam:  HEENT: Normocephalic, atraumatic.    General: alert and oriented; no acute distress.  Breast: Slight increased fullness/fatty tissue at left breast compared to right but no discrete masses or abnormalities noted. Normal color and contour of right and left breast.  No nipple inversion or discharge bilaterally.    Lymph: No palpable adjacent axillary lymph nodes.     ASSESSMENT:  Mr. Jett Ballard is a 54 y.o. year old male with LEFT breast pain.      PLAN: We discussed that there was nothing concerning on clinical breast exam today to account for his pain.  I also reviewed his imaging with our radiologists and  didn't see anything concerning.    I also reviewed his medications and none of them should be causative factors for breast pain.  We discussed that breast pain is normally attributed to caffeine intake versus hormonal changes.  I advised him to eliminate or cut back on drinking soda.  I also recommended over the counter supplements such as evening primrose oil and flaxseed tablets that are sometimes effective in relieving breast pain.  Diclofenac gel was an additional agent prescribed to alleviate his symptoms.    He was advised to notify me if his pain persists or worsens.  At that point he may benefit from short term use of Tamoxifen or Danazol.      ~Vicky Crespo PA-C      Surgical Oncology            12/6/2019

## 2019-12-06 NOTE — LETTER
December 6, 2019      Sana Tinsley, DAVY  [address] [facility] [address]           [facility]  [address]  [facility] [address]  Phone: [phone]  Fax: [phone]          Patient: Jett Ballard   MR Number: [id number]   YOB: 1965   Date of Visit: 12/6/2019       Dear Sana Tinsley:    Thank you for referring Jett Ballard to me for evaluation. Attached you will find relevant portions of my assessment and plan of care.    If you have questions, please do not hesitate to call me. I look forward to following Jett Ballard along with you.    Sincerely,    MARGARITA Ward    Enclosure  CC:  No Recipients    If you would like to receive this communication electronically, please contact [email or web address] or [phone] to request more information on [employer] Link access.    For providers and/or their staff who would like to refer a patient to [facility], please contact us through our one-stop-shop provider referral line, [facility], at [phone].    If you feel you have received this communication in error or would no longer like to receive these types of communications, please e-mail [email or web address]

## 2019-12-15 DIAGNOSIS — M48.02 CERVICAL STENOSIS OF SPINAL CANAL: ICD-10-CM

## 2019-12-16 RX ORDER — PANTOPRAZOLE SODIUM 40 MG/1
TABLET, DELAYED RELEASE ORAL
Qty: 90 TABLET | Refills: 3 | Status: SHIPPED | OUTPATIENT
Start: 2019-12-16 | End: 2020-07-30

## 2019-12-18 ENCOUNTER — TELEPHONE (OUTPATIENT)
Dept: SURGERY | Facility: CLINIC | Age: 54
End: 2019-12-18

## 2019-12-18 ENCOUNTER — DOCUMENTATION ONLY (OUTPATIENT)
Dept: SURGERY | Facility: CLINIC | Age: 54
End: 2019-12-18

## 2019-12-18 ENCOUNTER — PATIENT MESSAGE (OUTPATIENT)
Dept: SURGERY | Facility: CLINIC | Age: 54
End: 2019-12-18

## 2019-12-18 RX ORDER — TAMOXIFEN CITRATE 10 MG/1
10 TABLET ORAL DAILY
Qty: 30 TABLET | Refills: 2 | Status: SHIPPED | OUTPATIENT
Start: 2019-12-18 | End: 2020-02-18

## 2019-12-18 NOTE — TELEPHONE ENCOUNTER
Spoke with pt to get him scheduled with Dr Correa for breast pain and swelling soonest available was Jan 9----- Message from MARGARITA Ward sent at 12/18/2019  3:55 PM CST -----  Contact: pt @ [phone]  Is he still having pain?  The OTC meds were for pain, not swelling.  If he wants the swelling to go away he needs to see one of our breast surgeons because that it just fatty tissue.  It would require surgery for removal.    ----- Message -----  From: Roberta Wiklins MA  Sent: 12/18/2019   3:31 PM CST  To: MARGARITA aWrd        ----- Message -----  From: Fredis Hodges  Sent: 12/18/2019   3:15 PM CST  To: [facility] Staff    Pt is asking to speak neftali/ Vicky, [facility] meds Vicky advised pt to take are not helping with swelling

## 2019-12-19 NOTE — PROGRESS NOTES
"This patient called today and told the nurse he was having no relief of breast pain with the diclofenac gel and evening primrose oil tablets.  Therefore I prescribed Tamoxifen for his refractory pain.  He is also concerned about the "swelling"; however, what he had referred to as swelling when I saw him was simply fatty breast tissue.  I explained that if he wants this removed he will need to see one of our breast surgeons.  He is scheduled to see  on 1/9/20.    ~Vicky Crespo PA-C  "

## 2019-12-27 ENCOUNTER — TELEPHONE (OUTPATIENT)
Dept: INTERNAL MEDICINE | Facility: CLINIC | Age: 54
End: 2019-12-27

## 2019-12-27 DIAGNOSIS — I10 HYPERTENSION, UNSPECIFIED TYPE: ICD-10-CM

## 2019-12-27 NOTE — TELEPHONE ENCOUNTER
----- Message from Jennifer Eid sent at 2019  2:06 PM CST -----  Contact: Self  Jett Ballard  MRN: [id number]  : 1965  PCP: Sana Tinsley  Home Phone      [phone]  Work Phone      Not on file.  Mobile          [phone]    MESSAGE:     Calling to check status of his referral to specialist.  Please call to give status and advise.    Phone: [phone]

## 2019-12-30 NOTE — TELEPHONE ENCOUNTER
"Patient states a referral was discusses at last office visit for right hand "pinky finger", patient unaware of what kind of physician he should be scheduled with for this issue. States he is unable to utilize finger. Patient aware you are out of the office. Please advise.   "

## 2019-12-31 ENCOUNTER — EXTERNAL CHRONIC CARE MANAGEMENT (OUTPATIENT)
Dept: PRIMARY CARE CLINIC | Facility: CLINIC | Age: 54
End: 2019-12-31
Payer: MEDICARE

## 2019-12-31 DIAGNOSIS — I10 HYPERTENSION, UNSPECIFIED TYPE: ICD-10-CM

## 2019-12-31 PROCEDURE — 99490 CHRNC CARE MGMT STAFF 1ST 20: CPT | Mod: S$PBB | Performed by: NURSE PRACTITIONER

## 2019-12-31 PROCEDURE — 99490 CHRNC CARE MGMT STAFF 1ST 20: CPT | Mod: PBBFAC | Performed by: NURSE PRACTITIONER

## 2019-12-31 PROCEDURE — 99999 PR STA SHADOW: ICD-10-PCS | Mod: PBBFAC,,, | Performed by: NURSE PRACTITIONER

## 2019-12-31 PROCEDURE — 99999 PR STA SHADOW: CPT | Mod: PBBFAC,,, | Performed by: NURSE PRACTITIONER

## 2019-12-31 RX ORDER — BENAZEPRIL HYDROCHLORIDE 20 MG/1
20 TABLET ORAL DAILY
Qty: 30 TABLET | Refills: 0 | Status: SHIPPED | OUTPATIENT
Start: 2019-12-31 | End: 2020-01-16

## 2019-12-31 NOTE — TELEPHONE ENCOUNTER
----- Message from Jennifer Eid sent at 2019  9:03 AM CST -----  Contact: Self  Jett Ballard  MRN: [id number]  : 1965  PCP: Sana Tinsley  Home Phone      [phone]  Work Phone      Not on file.  Mobile          [phone]      MESSAGE:   Requesting refill of RX  benazepril (LOTENSIN) 20 MG tablet    Pharmacy: [facility] #[id number] - [address]    Phone: [phone]

## 2020-01-06 DIAGNOSIS — M65.30 TRIGGER FINGER, UNSPECIFIED FINGER, UNSPECIFIED LATERALITY: Primary | ICD-10-CM

## 2020-01-06 RX ORDER — BENAZEPRIL HYDROCHLORIDE 20 MG/1
TABLET ORAL
Qty: 30 TABLET | Refills: 0 | OUTPATIENT
Start: 2020-01-06

## 2020-01-06 NOTE — TELEPHONE ENCOUNTER
Patient states he has never seen an orthopedist. States he missed the one appointment that was scheduled. Patient scheduled at [facility]. Patient notified of appointment information per appointment tab.

## 2020-01-06 NOTE — TELEPHONE ENCOUNTER
from his last note with Viky it looks like he was seeing an orthopedist for trigger finger. Unsure where. He has no notes or referral in epic. I did place a referral if he would like to See Luann next door.

## 2020-01-07 ENCOUNTER — TELEPHONE (OUTPATIENT)
Dept: NEUROLOGY | Facility: CLINIC | Age: 55
End: 2020-01-07

## 2020-01-07 ENCOUNTER — HOSPITAL ENCOUNTER (OUTPATIENT)
Dept: RADIOLOGY | Facility: HOSPITAL | Age: 55
Discharge: HOME | End: 2020-01-07
Attending: PHYSICIAN ASSISTANT
Payer: MEDICARE

## 2020-01-07 ENCOUNTER — OFFICE VISIT (OUTPATIENT)
Dept: ORTHOPEDICS | Facility: CLINIC | Age: 55
End: 2020-01-07
Payer: MEDICARE

## 2020-01-07 VITALS
HEIGHT: 74 IN | HEART RATE: 96 BPM | SYSTOLIC BLOOD PRESSURE: 130 MMHG | RESPIRATION RATE: 18 BRPM | WEIGHT: 196.75 LBS | DIASTOLIC BLOOD PRESSURE: 92 MMHG | BODY MASS INDEX: 25.25 KG/M2

## 2020-01-07 DIAGNOSIS — S69.91XA INJURY OF FINGER OF RIGHT HAND, INITIAL ENCOUNTER: ICD-10-CM

## 2020-01-07 DIAGNOSIS — M21.241 FLEXION DEFORMITY OF FINGER JOINT OF RIGHT HAND: Primary | ICD-10-CM

## 2020-01-07 DIAGNOSIS — M79.643 PAIN OF HAND, UNSPECIFIED LATERALITY: Primary | ICD-10-CM

## 2020-01-07 DIAGNOSIS — M79.643 PAIN OF HAND, UNSPECIFIED LATERALITY: ICD-10-CM

## 2020-01-07 PROCEDURE — 99999 PR PBB SHADOW E&M-EST. PATIENT-LVL IV: CPT | Mod: PBBFAC,,, | Performed by: PHYSICIAN ASSISTANT

## 2020-01-07 PROCEDURE — 99999 PR PBB SHADOW E&M-EST. PATIENT-LVL IV: ICD-10-PCS | Mod: PBBFAC,,, | Performed by: PHYSICIAN ASSISTANT

## 2020-01-07 PROCEDURE — 73130 X-RAY EXAM OF HAND: CPT | Mod: 26,RT,, | Performed by: RADIOLOGY

## 2020-01-07 PROCEDURE — 73130 X-RAY EXAM OF HAND: CPT | Mod: TC,RT

## 2020-01-07 PROCEDURE — 99203 OFFICE O/P NEW LOW 30 MIN: CPT | Mod: S$PBB | Performed by: PHYSICIAN ASSISTANT

## 2020-01-07 PROCEDURE — 73130 XR HAND COMPLETE 3 VIEW RIGHT: ICD-10-PCS | Mod: 26,RT,, | Performed by: RADIOLOGY

## 2020-01-07 PROCEDURE — 99999 PR STA SHADOW: CPT | Mod: PBBFAC,,, | Performed by: PHYSICIAN ASSISTANT

## 2020-01-07 PROCEDURE — 99214 OFFICE O/P EST MOD 30 MIN: CPT | Mod: PBBFAC,25 | Performed by: PHYSICIAN ASSISTANT

## 2020-01-07 NOTE — TELEPHONE ENCOUNTER
Spoke to Ms Prince at Dr. Dockery office scheduled pt appt on 01/23/2019 at 10:45 also put pt on waiting list and will mail reminder.

## 2020-01-07 NOTE — LETTER
January 7, 2020      Sana Tinsley NP  [address] [facility] [id number] [place] [address] Spec. - Orthopedics  [address] [facility] [address]  Phone: [phone]          Patient: Jett Ballard   MR Number: [id number]   YOB: 1965   Date of Visit: 1/7/2020       Dear Sana Tinsley:    Thank you for referring Jett Ballard to me for evaluation. Attached you will find relevant portions of my assessment and plan of care.    If you have questions, please do not hesitate to call me. I look forward to following Jett Ballard along with you.    Sincerely,    Melinda Yates PA-C    Enclosure  CC:  No Recipients    If you would like to receive this communication electronically, please contact [email or web address] or [phone] to request more information on [employer] Link access.    For providers and/or their staff who would like to refer a patient to [facility], please contact us through our one-stop-shop provider referral line, [facility], at [phone].    If you feel you have received this communication in error or would no longer like to receive these types of communications, please e-mail [email or web address]

## 2020-01-07 NOTE — PROGRESS NOTES
Subjective:      Patient ID: Jett Ballard is a 54 y.o. male.    Chief Complaint: trigger finger    53 yo M presents to clinic with c/o deformity of right small finger x 2 years.  States that he injured finger carrying heavy grocery bag.  Finger gradually began flexing and now he cannot straighten out finger.  He never sought medical care for injury.  He states that he would like to see if something can be done to straighten finger out.  Denies any pain, numbness, or tingling to finger.      Review of Systems   Constitution: Negative for chills, diaphoresis and fever.   HENT: Negative for congestion, ear discharge and ear pain.    Eyes: Negative for blurred vision, discharge, double vision and pain.   Cardiovascular: Negative for chest pain, claudication and cyanosis.   Respiratory: Negative for cough, hemoptysis and shortness of breath.    Endocrine: Negative for cold intolerance and heat intolerance.   Skin: Negative for color change, dry skin, itching and rash.   Musculoskeletal: Negative for arthritis, back pain, falls, gout, joint pain, joint swelling, muscle weakness and neck pain.        Deformity of right small finger   Gastrointestinal: Negative for abdominal pain and change in bowel habit.   Neurological: Negative for brief paralysis, disturbances in coordination and dizziness.   Psychiatric/Behavioral: Negative for altered mental status and depression.         Objective:            General    Constitutional: He is oriented to person, place, and time. He appears well-developed and well-nourished. No distress.   HENT:   Head: Normocephalic.   Eyes: EOM are normal. Right eye exhibits no discharge. Left eye exhibits no discharge.   Cardiovascular: Normal rate.    Pulmonary/Chest: Effort normal. No respiratory distress.   Abdominal: Soft.   Neurological: He is alert and oriented to person, place, and time.   Psychiatric: He has a normal mood and affect. His behavior is normal.             Right Hand/Wrist  Exam     Inspection   Scars: Wrist - absent Hand -  absent  Effusion: Wrist - absent Hand -  absent  Bruising: Wrist - absent Hand -  absent  Deformity: Wrist - deformity Hand -  deformity (flexion contracture at PIP joint of right small finger)    Range of Motion     Wrist   Extension: normal   Flexion: normal   Pronation: normal   Supination: normal     Tests   Tinel's sign (median nerve): negative  Finkelstein's test: negative  Carpal Tunnel Compression Test: negative  Cubital Tunnel Compression Test: negative      Other     Neuorologic Exam    Median Distribution: normal  Ulnar Distribution: normal  Radial Distribution: normal    Comments:  No pain, swelling, or tenderness  Unable to extend right small finger at PIP joint      Left Hand/Wrist Exam     Inspection   Scars: Wrist - absent Hand -  absent  Effusion: Wrist - absent Hand -  absent  Bruising: Wrist - absent Hand -  absent  Deformity: Wrist - absent Hand -  absent    Range of Motion     Wrist   Extension: normal   Flexion: normal   Pronation: normal   Supination: normal     Tests   Tinel's sign (median nerve): negative  Finkelstein's test: negative  Carpal Tunnel Compression Test: negative  Cubital Tunnel Compression Test: negative      Other     Sensory Exam  Median Distribution: normal  Ulnar Distribution: normal  Radial Distribution: normal    Comments:  No pain, swelling, or tenderness      Right Elbow Exam     Tests   Tinel's sign (cubital tunnel): negative      Left Elbow Exam     Tests   Tinel's sign (cubital tunnel): negative        Muscle Strength   Right Upper Extremity   Wrist extension: 5/5/5   Wrist flexion: 5/5/5   : 5/5/5   Left Upper Extremity  Wrist extension: 5/5/5   Wrist flexion: 5/5/5   :  5/5/5     Vascular Exam       Capillary Refill  Right Hand: normal capillary refill  Left Hand: normal capillary refill      Xray Right Hand 1/7/20:  There is chronic flexion deformity of the 5th proximal interphalangeal joint.  There is  unchanged as compared to the previous study.  No fracture or dislocations are identified. Joint spaces are maintained.        Assessment:       Encounter Diagnoses   Name Primary?   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  2. Patient will contact clinic if he has any questions or develops any new signs or symptoms.  3. RTC PRN.

## 2020-01-09 ENCOUNTER — OFFICE VISIT (OUTPATIENT)
Dept: SURGERY | Facility: CLINIC | Age: 55
End: 2020-01-09
Payer: MEDICARE

## 2020-01-09 VITALS
TEMPERATURE: 99 F | HEART RATE: 100 BPM | SYSTOLIC BLOOD PRESSURE: 133 MMHG | DIASTOLIC BLOOD PRESSURE: 93 MMHG | BODY MASS INDEX: 25.26 KG/M2 | HEIGHT: 74 IN

## 2020-01-09 DIAGNOSIS — I88.9 AXILLARY LYMPHADENITIS: Primary | ICD-10-CM

## 2020-01-09 PROCEDURE — 99999 PR PBB SHADOW E&M-EST. PATIENT-LVL III: ICD-10-PCS | Mod: PBBFAC,,, | Performed by: SURGERY

## 2020-01-09 PROCEDURE — 99999 PR PBB SHADOW E&M-EST. PATIENT-LVL III: CPT | Mod: PBBFAC,,, | Performed by: SURGERY

## 2020-01-09 PROCEDURE — 99203 PR OFFICE/OUTPT VISIT, NEW, LEVL III, 30-44 MIN: ICD-10-PCS | Mod: S$PBB,,, | Performed by: SURGERY

## 2020-01-09 PROCEDURE — 99203 OFFICE O/P NEW LOW 30 MIN: CPT | Mod: S$PBB,,, | Performed by: SURGERY

## 2020-01-09 PROCEDURE — 99213 OFFICE O/P EST LOW 20 MIN: CPT | Mod: PBBFAC | Performed by: SURGERY

## 2020-01-09 NOTE — PROGRESS NOTES
[facility]  [facility] [place]  1/9/2020      SUBJECTIVE:   Mr. Jett Ballard is a 54 y.o. male who presents today for follow up of LEFT breast pain.    History of Present Illness:  Patient states he has had pain at his LEFT breast for the past several months.  He describes it as constant and worsening over the past few weeks.  Putting a pillow underneath his left arm sometimes helps but 800 mg of Ibuprofen provides no relief.  Patient states he had this similar pain about 3 years ago and it went away with treatment of antibiotics.  His caffeine intake previously was  2-3 cokes per day but he has since cut down and has not noticed a change. He denies any intake of ETOH and recent estradiol lab was low (<10).    He saw Vicky in December at which time he was prescribed diclofenac gel and advised to cut back on his caffeine intake. He called the office later that month denying relief at which time tamoxifen was prescribed to the patient. Since beginning the tamoxifen he has not noticed any decrease in the breast pain which he describes as an aching pain. His tenderness is localized to the upper outer quadrant and under the left axilla.     On 8/13/19 he had a diagnostic mammogram of the left breast that showed nothing concerning (BIRADS 1, negative).  A focused ultrasound on 10/31/19 also showed no abnormalities in the left breast that could be attributed to his pain.      Past Medical History:   Diagnosis Date    Hypercholesteremia     Hypertension     Left sided numbness     Leg weakness     Other and unspecified hyperlipidemia     Positional lightheadedness     Stroke 2014    Tremor     Weakness      Past Surgical History:   Procedure Laterality Date    BACK SURGERY      COLONOSCOPY N/A 7/19/2018    Procedure: COLONOSCOPY;  Surgeon: Patric Carrera MD;  Location: [facility];  Service: Endoscopy;  Laterality: N/A;    TONSILLECTOMY       Social History     Socioeconomic History     Marital status: Single     Spouse name: Not on file    Number of children: Not on file    Years of education: 11    Highest education level: Not on file   Occupational History    Not on file   Social Needs    Financial resource strain: Not on file    Food insecurity:     Worry: Not on file     Inability: Not on file    Transportation needs:     Medical: Not on file     Non-medical: Not on file   Tobacco Use    Smoking status: Never Smoker    Smokeless tobacco: Current User     Types: Snuff    Tobacco comment: 33 yr history of dip   Substance and Sexual Activity    Alcohol use: No     Alcohol/week: 0.0 standard drinks    Drug use: No    Sexual activity: Yes     Partners: Female     Birth control/protection: None   Lifestyle    Physical activity:     Days per week: Not on file     Minutes per session: Not on file    Stress: Not on file   Relationships    Social connections:     Talks on phone: Not on file     Gets together: Not on file     Attends Anabaptist service: Not on file     Active member of club or organization: Not on file     Attends meetings of clubs or organizations: Not on file     Relationship status: Not on file   Other Topics Concern    Not on file   Social History Narrative    Not on file     Review of patient's allergies indicates:   Allergen Reactions    Adhesive Rash    Latex, natural rubber Rash      Family History: He denies any family history of breast or ovarian cancer.  Denies any other family history of cancer.    Review of Systems: Denies any chest pain or shortness of breath.  Denies any fever or chills.  See HPI/ Interval History for other systems reviewed.    OBJECTIVE:   There were no vitals filed for this visit.     Physical Exam:    Physical Exam   Constitutional: He is oriented to person, place, and time. He appears well-developed and well-nourished.   HENT:   Head: Normocephalic and atraumatic.   Nose: Nose normal.   Eyes: Pupils are equal, round, and reactive to  light. Conjunctivae and EOM are normal. No scleral icterus.   Neck: Normal range of motion. No tracheal deviation present.   Cardiovascular: Normal rate and regular rhythm.   Pulmonary/Chest: Effort normal. No respiratory distress.   Abdominal: Soft. He exhibits no distension. There is no tenderness.   Musculoskeletal: Normal range of motion.   Neurological: He is alert and oriented to person, place, and time.   Skin: Skin is warm and dry. No rash noted.   Breast: Slight increased fullness/fatty tissue at left breast compared to right but no discrete masses or abnormalities noted. Normal color and contour of right and left breast.  No nipple inversion or discharge bilaterally.  Tenderness specific to upper outer quadrant of left breast and axilla. No significant lymphadenopathy palpable    ASSESSMENT:  Mr. Jett Ballard is a 54 y.o. year old male with LEFT breast pain.      PLAN:   - Discussed with patient given no localized tenderness under the nipple in the typical location for gynecomastia as well as his tattoos this is likely a reactive lymphadenitis related to the tattoo ink. Given this, there is nothing further to do.   - Complete imaging workout has been completed and is negative for concerning pathology  - RTC prn    Nazia Ford MD  PGY-3 General Surgery   [phone]    I have personally taken the history and examined this patient and agree with the resident's note as stated above.  The patient has pain and tenderness in the left breast upper outer quadrant axillary tail region as well as left axilla.  There are no suspicious masses nodules or densities in the left breast and there is no evidence of gynecomastia beneath the left nipple-areolar complex.  There is no pain or tenderness associated with the left nipple-areolar complex.  There are no suspicious clinical findings on breast and axillary exam.  It is only noteworthy that there is tenderness on exam in the left axilla which may be related to  tattoo related lymphadenitis given the history of tattoos along the left upper extremity.  Patient was provided reassurance of the benign etiology of the pain and tenderness in the left axillary tail region likely related to tattoo induced lymphadenitis.  He will follow up with me prn.

## 2020-01-13 PROBLEM — I88.9 AXILLARY LYMPHADENITIS: Status: ACTIVE | Noted: 2020-01-13

## 2020-01-16 ENCOUNTER — OFFICE VISIT (OUTPATIENT)
Dept: INTERNAL MEDICINE | Facility: CLINIC | Age: 55
End: 2020-01-16
Payer: MEDICARE

## 2020-01-16 VITALS
SYSTOLIC BLOOD PRESSURE: 112 MMHG | RESPIRATION RATE: 18 BRPM | HEART RATE: 87 BPM | DIASTOLIC BLOOD PRESSURE: 84 MMHG | HEIGHT: 74 IN | BODY MASS INDEX: 25.44 KG/M2 | WEIGHT: 198.19 LBS | OXYGEN SATURATION: 98 %

## 2020-01-16 DIAGNOSIS — Z12.5 PROSTATE CANCER SCREENING: ICD-10-CM

## 2020-01-16 DIAGNOSIS — E78.2 MIXED HYPERLIPIDEMIA: ICD-10-CM

## 2020-01-16 DIAGNOSIS — K21.9 GASTROESOPHAGEAL REFLUX DISEASE WITHOUT ESOPHAGITIS: ICD-10-CM

## 2020-01-16 DIAGNOSIS — I10 ESSENTIAL HYPERTENSION: Primary | ICD-10-CM

## 2020-01-16 PROCEDURE — 99214 OFFICE O/P EST MOD 30 MIN: CPT | Mod: S$PBB,,, | Performed by: NURSE PRACTITIONER

## 2020-01-16 PROCEDURE — 99999 PR PBB SHADOW E&M-EST. PATIENT-LVL III: ICD-10-PCS | Mod: PBBFAC,,, | Performed by: NURSE PRACTITIONER

## 2020-01-16 PROCEDURE — 99214 PR OFFICE/OUTPT VISIT, EST, LEVL IV, 30-39 MIN: ICD-10-PCS | Mod: S$PBB,,, | Performed by: NURSE PRACTITIONER

## 2020-01-16 PROCEDURE — 99999 PR PBB SHADOW E&M-EST. PATIENT-LVL III: CPT | Mod: PBBFAC,,, | Performed by: NURSE PRACTITIONER

## 2020-01-16 PROCEDURE — 99213 OFFICE O/P EST LOW 20 MIN: CPT | Mod: PBBFAC,PN | Performed by: NURSE PRACTITIONER

## 2020-01-16 RX ORDER — ROSUVASTATIN CALCIUM 20 MG/1
TABLET, COATED ORAL
Qty: 30 TABLET | Refills: 5 | Status: SHIPPED | OUTPATIENT
Start: 2020-01-16 | End: 2020-09-08 | Stop reason: SDUPTHER

## 2020-01-16 RX ORDER — AMLODIPINE AND BENAZEPRIL HYDROCHLORIDE 10; 20 MG/1; MG/1
1 CAPSULE ORAL DAILY
Qty: 30 CAPSULE | Refills: 5 | Status: SHIPPED | OUTPATIENT
Start: 2020-01-16 | End: 2020-07-08 | Stop reason: SDUPTHER

## 2020-01-16 NOTE — PATIENT INSTRUCTIONS
"  Facts About Dietary Fat     Olive oil is a good source of unsaturated fat.     Eating less saturated and trans fat is one of the best things you can do for your heart. Start by finding out which fats are better to use. Then always try to use as little "bad" fat as you can.  Why eat less fat?  · Cutting down on the fat you eat can lower your blood cholesterol levels. This may help prevent clogged arteries from buildup of plaque.  · A low-fat diet can help you lose excess weight. Doing so can lower your blood pressure and reduce your chances of getting diabetes.  · A low-fat diet reduces your risk for stroke and for some cancers.  Unsaturated fat is most healthy  · When you must add fat, use unsaturated fat.  · Unsaturated fats come from plants. They include olive, canola, peanut, corn, avocado, safflower, and sunflower oils.  · Liquid (squeezable) margarine is also mostly unsaturated fat.  · In moderate amounts, unsaturated fat can even be good for your heart.  Saturated fat is less healthy  · Avoid eating saturated fat because it raises your blood cholesterol levels.  · Most saturated fat comes from animals. Foods such as butter, lard, cheese, cream, whole milk, and fatty cuts of meat are high in saturated fat.  · Some oils, such as palm and coconut oils, are also saturated fats.  Trans fat is least healthy  · Also avoid trans fat whenever possible. Even if it's not listed on the food label, look for it in the ingredients in the form of hydrogenated or partially hydrogenated oils.  · This is found in snack foods, shortening, french fries, and stick margarines.  Add flavor without fat  · Sprinkle herbs on fish, chicken, and meat, and in soups.  · Try herbs, lemon juice, or flavored vinegar on vegetables.  · Add chopped onions, garlic, and peppers to flavor beans and rice.   Date Last Reviewed: 5/11/2015  © 0285-4631 [employer]. [address] [place] [address], [place] [address]. All rights reserved. This " information is not intended as a substitute for professional medical care. Always follow your healthcare professional's instructions.

## 2020-01-16 NOTE — PROGRESS NOTES
Subjective:       Patient ID: Jett Ballard is a 54 y.o. male.    Chief Complaint: Establish Care (check up)    Patient is unknown, to me and presents with   Chief Complaint   Patient presents with    [facility]     check up   .  Denies chest pain and shortness of breath.  Patient presents with establishment to clinic for a second opinion.  He will need to have refills and does see Dr. Armijo. He feels fine at this time  HPI  Review of Systems   Constitutional: Negative.  Negative for activity change, appetite change, chills, diaphoresis, fatigue, fever and unexpected weight change.   HENT: Negative.  Negative for congestion, ear discharge, ear pain, facial swelling, hearing loss, nosebleeds, postnasal drip, rhinorrhea, sinus pressure, sneezing, sore throat, tinnitus, trouble swallowing and voice change.    Eyes: Negative.  Negative for photophobia, pain, discharge, redness, itching and visual disturbance.   Respiratory: Negative.  Negative for apnea, cough, choking, chest tightness, shortness of breath, wheezing and stridor.    Cardiovascular: Negative.  Negative for chest pain, palpitations and leg swelling.   Gastrointestinal: Negative for abdominal distention, abdominal pain, anal bleeding, blood in stool, constipation, diarrhea, nausea and vomiting.   Genitourinary: Negative.  Negative for difficulty urinating, discharge, dysuria, enuresis, flank pain, frequency, hematuria, penile pain, penile swelling, scrotal swelling, testicular pain and urgency.   Musculoskeletal: Negative.  Negative for arthralgias, back pain, gait problem, joint swelling, myalgias, neck pain and neck stiffness.   Skin: Negative.  Negative for color change, pallor, rash and wound.   Neurological: Negative for dizziness, tremors, seizures, syncope, facial asymmetry, speech difficulty, weakness, light-headedness, numbness and headaches.   Hematological: Negative for adenopathy. Does not bruise/bleed easily.   Psychiatric/Behavioral:  Negative.  Negative for agitation, dysphoric mood, sleep disturbance and suicidal ideas. The patient is not nervous/anxious.        Objective:      Physical Exam   Constitutional: He is oriented to person, place, and time. He appears well-developed and well-nourished. No distress.   HENT:   Head: Normocephalic and atraumatic.   Right Ear: External ear normal.   Left Ear: External ear normal.   Nose: Nose normal.   Mouth/Throat: Oropharynx is clear and moist. No oropharyngeal exudate.   Eyes: Pupils are equal, round, and reactive to light. Conjunctivae and EOM are normal. Right eye exhibits no discharge. Left eye exhibits no discharge.   Neck: Normal range of motion. Neck supple. No JVD present. No tracheal deviation present. No thyromegaly present.   Cardiovascular: Normal rate, regular rhythm, normal heart sounds and intact distal pulses. Exam reveals no gallop and no friction rub.   No murmur heard.  Pulmonary/Chest: Effort normal and breath sounds normal. No stridor. No respiratory distress. He has no wheezes. He has no rales. He exhibits no tenderness.   Abdominal: Soft. Bowel sounds are normal. He exhibits no distension. There is no tenderness. There is no rebound and no guarding.   Musculoskeletal: Normal range of motion. He exhibits no edema or tenderness.   Lymphadenopathy:     He has no cervical adenopathy.   Neurological: He is alert and oriented to person, place, and time. He has normal reflexes. He displays normal reflexes. No cranial nerve deficit. He exhibits normal muscle tone. Coordination normal.   Skin: Skin is warm and dry. Capillary refill takes less than 2 seconds. No rash noted. He is not diaphoretic. No erythema. No pallor.   Psychiatric: He has a normal mood and affect. His behavior is normal. Judgment and thought content normal.   Nursing note and vitals reviewed.      Assessment:       1. Essential hypertension    2. Mixed hyperlipidemia    3. Gastroesophageal reflux disease without  "esophagitis    4. Prostate cancer screening        Plan:   Jett was seen today for establish care.    Diagnoses and all orders for this visit:    Essential hypertension  -     amlodipine-benazepril 10-20mg (LOTREL) 10-20 mg per capsule; Take 1 capsule by mouth once daily.  -     CBC auto differential; Future  -     Comprehensive metabolic panel; Future  -     Microalbumin/creatinine urine ratio; Future    Mixed hyperlipidemia  -     rosuvastatin (CRESTOR) 20 MG tablet; Take 1 tablet by mouth once in the evening (in place of atorvastatin).  -     CBC auto differential; Future  -     Comprehensive metabolic panel; Future  -     TSH; Future  -     Lipid panel; Future    Gastroesophageal reflux disease without esophagitis  -     CBC auto differential; Future  -     Comprehensive metabolic panel; Future    Prostate cancer screening  -     PSA, Screening; Future    "This note will not be shared with the patient."  Lab drawn today CBC, CMP, TSH, FLP  Limit the cholesterol in your diet to less than 300 mg per day.  Fats should contribute no more than 20 to 35% of your daily calories.  Less than 7 to 10% of your calories should come from saturated fat.  Avoid saturated fat products e.g., butter, some oils, meat, and poultry fat contain a lot of saturated fat.  Check food labels for fat and cholesterol content. Choose the foods with less fat per serving.  Limit the amount of butter and margarine you eat.  Use salad dressings and margarine made with polyunsaturated and monunsaturated fats.  Use egg whites or egg substitutes rather than whole eggs.  Replace whole-milk dairy products with nonfat or low-fat mild, cheese, spreads, and yogurt.  Eat skinless chicken, turkey, fish, and meatless entrees more often than red meat.  Choose lean cuts of meat and trim off all visible fat. Keep portion sizes moderate.  Avoid fatty desserts such as ice cream, cream-filled cakes, and cheesecakes. Choose fresh fruits, nonfat frozen yogurt, " Popsicles, etc.  Reduce the amount of fried foods, vending machine food, and fast food you eat.  Eat fruits and vegetables (especially fresh fruits and leafy vegetables), beans, and whole grains daily. The fiber in these foods helps lower cholesterol.  Look for low-fat or nonfat varieties of the foods you like to eat or look for substitutes.  You may need to exercise 60 minutes a day to prevent weight gain and 90 minutes a day to lose weight.  RTC in three months

## 2020-01-23 ENCOUNTER — OFFICE VISIT (OUTPATIENT)
Dept: ORTHOPEDICS | Facility: CLINIC | Age: 55
End: 2020-01-23
Payer: MEDICARE

## 2020-01-23 VITALS — HEIGHT: 74 IN | WEIGHT: 198.19 LBS | BODY MASS INDEX: 25.44 KG/M2

## 2020-01-23 DIAGNOSIS — M79.641 RIGHT HAND PAIN: ICD-10-CM

## 2020-01-23 DIAGNOSIS — M72.0 DUPUYTREN'S CONTRACTURE OF RIGHT HAND: ICD-10-CM

## 2020-01-23 DIAGNOSIS — M21.941 ACQUIRED DEFORMITY OF RIGHT HAND: Primary | ICD-10-CM

## 2020-01-23 PROCEDURE — 99204 OFFICE O/P NEW MOD 45 MIN: CPT | Mod: S$PBB,,, | Performed by: ORTHOPAEDIC SURGERY

## 2020-01-23 PROCEDURE — 99999 PR PBB SHADOW E&M-EST. PATIENT-LVL III: ICD-10-PCS | Mod: PBBFAC,,, | Performed by: ORTHOPAEDIC SURGERY

## 2020-01-23 PROCEDURE — 99204 PR OFFICE/OUTPT VISIT, NEW, LEVL IV, 45-59 MIN: ICD-10-PCS | Mod: S$PBB,,, | Performed by: ORTHOPAEDIC SURGERY

## 2020-01-23 PROCEDURE — 99213 OFFICE O/P EST LOW 20 MIN: CPT | Mod: PBBFAC,PN | Performed by: ORTHOPAEDIC SURGERY

## 2020-01-23 PROCEDURE — 99999 PR PBB SHADOW E&M-EST. PATIENT-LVL III: CPT | Mod: PBBFAC,,, | Performed by: ORTHOPAEDIC SURGERY

## 2020-01-23 NOTE — PROGRESS NOTES
[facility]  History & Physical  Orthopedics    SUBJECTIVE:      Chief Complaint:  Right small finger    Referring Provider: Melinda Yates PA-C     History of Present Illness:  Patient is a 54 y.o. right hand dominant male who presents today with complaints of right small finger contracture and deformity.  The patient notes that this all began approximately 1 year ago with a traumatic event. He notes that he was lifting several heavy grocery bags with his small finger at which time he had an injury to it with subsequent ecchymosis and slowly progressive deformity thereafter.  Since that time he reports slowly progressive contracture to the right small finger PIP joint. He notes that this is associated with some pain which he rates at 6/10 in severity.  He presents today with no complaints of any numbness or tingling or anything else going on and presents for initial evaluation.     The patient is a/an not currently working.    Onset of symptoms/DOI was 1 year ago.    Symptoms are aggravated by activity and movement.    Symptoms are alleviated by Not thing.    Symptoms consist of deformity.    The patient rates their pain as a 6/10.    Attempted treatment(s) and/or interventions include rest and activity modification.     The patient denies any fevers, chills, N/V, D/C and presents for evaluation.       Past Medical History:   Diagnosis Date    Hypercholesteremia     Hypertension     Left sided numbness     Leg weakness     Other and unspecified hyperlipidemia     Positional lightheadedness     Stroke 2014    Tremor     Weakness      Past Surgical History:   Procedure Laterality Date    BACK SURGERY      COLONOSCOPY N/A 7/19/2018    Procedure: COLONOSCOPY;  Surgeon: Patrci Carrera MD;  Location: [facility];  Service: Endoscopy;  Laterality: N/A;    TONSILLECTOMY       Review of patient's allergies indicates:   Allergen Reactions    Adhesive Rash    Latex, natural rubber Rash  "    Social History     Social History Narrative    Not on file     Family History   Problem Relation Age of Onset    Heart disease Father     Cancer Father          Current Outpatient Medications:     amlodipine-benazepril 10-20mg (LOTREL) 10-20 mg per capsule, Take 1 capsule by mouth once daily., Disp: 30 capsule, Rfl: 5    aspirin 81 MG Chew, Take 162 mg by mouth once daily. , Disp: , Rfl:     diclofenac sodium (VOLTAREN) 1 % Gel, Apply 2 g topically 4 (four) times daily., Disp: 1 Tube, Rfl: 1    ibuprofen (ADVIL,MOTRIN) 800 MG tablet, Take 1 tablet (800 mg total) by mouth 3 (three) times daily as needed for Pain., Disp: 30 tablet, Rfl: 0    pantoprazole (PROTONIX) 40 MG tablet, Take 40 mg by mouth once daily., Disp: , Rfl: 11    pantoprazole (PROTONIX) 40 MG tablet, TAKE 1 TABLET BY MOUTH EVERY DAY, Disp: 90 tablet, Rfl: 3    rosuvastatin (CRESTOR) 20 MG tablet, Take 20 mg by mouth once daily., Disp: , Rfl:     rosuvastatin (CRESTOR) 20 MG tablet, Take 1 tablet by mouth once in the evening (in place of atorvastatin)., Disp: 30 tablet, Rfl: 5    triamcinolone acetonide 0.1% (KENALOG) 0.1 % cream, Apply topically 2 (two) times daily., Disp: 80 g, Rfl: 0    tamoxifen (NOLVADEX) 10 MG Tab, Take 1 tablet (10 mg total) by mouth once daily For breast pain., Disp: 30 tablet, Rfl: 2      Review of Systems:  Constitutional: no fever or chills  Eyes: no visual changes  ENT: no nasal congestion or sore throat  Respiratory: no cough or shortness of breath  Cardiovascular: no chest pain  Gastrointestinal: no nausea or vomiting, tolerating diet  Musculoskeletal: pain    OBJECTIVE:      Vital Signs (Most Recent):  Vitals:    01/23/20 1018   Weight: 89.9 kg (198 lb 3.1 oz)   Height: 6' 2" (1.88 m)     Body mass index is 25.45 kg/m².      Physical Exam:  Constitutional: The patient appears well-developed and well-nourished. No distress.   Head: Normocephalic and atraumatic.   Nose: Nose normal.   Eyes: Conjunctivae " and EOM are normal.   Neck: No tracheal deviation present.   Cardiovascular: Normal rate and intact distal pulses.    Pulmonary/Chest: Effort normal. No respiratory distress.   Abdominal: There is no guarding.   Neurological: The patient is alert.   Psychiatric: The patient has a normal mood and affect.     Right Hand/Wrist Examination:    Observation/Inspection:  Swelling  none    Deformity  95 degree PIP contracture right small finger with palpable possible spiral Dupuytren's cord versus flexor tendon bowstring at the level of the proximal phalanx  Discoloration  none     Scars   none    Atrophy  none    HAND/WRIST EXAMINATION:  Finkelstein's Test   Neg  WHAT Test    Neg  Snuff box tenderness   Neg  Frost's Test    Neg  Hook of Hamate Tenderness  Neg  CMC grind    Neg  Circumduction test   Neg    Neurovascular Exam:  Digits WWP, brisk CR < 3s throughout  NVI motor/LTS to M/R/U nerves, radial pulse 2+  Tinel's Test - Carpal Tunnel  Neg  Tinel's Test - Cubital Tunnel  Neg  Phalen's Test    Neg  Median Nerve Compression Test Neg    ROM hand/wrist/elbow full, painless except for fixed flexion deformity of the right small finger PIP joint    Diagnostic Results:     Xray -  x-rays right hand demonstrate contracture of the right small finger PIP joint with no acute fractures or dislocations  EMG - none    ASSESSMENT/PLAN:      54 y.o. yo male with painful deformity of the right small finger as per above  Plan:  We discussed treatment options.  The patient has a substantial PIP fixed flexion contracture of approximately 95° in the right small finger.  There is report of a traumatic origin and at the time of onset.  The patient notes with slowly progressive.  There is no significant Dupuytren's disease in his contralateral hand or elsewhere in the right hand although there is a palpable subcutaneous Dupuytren's spiral band/cord verses flexor tendon bowstring of the right small finger on examination today.  I would like to  evaluate the small finger further with an MRI to determine if this is possibly a Dupuytren's cord verses pulley injury with subsequent bowstring or any other possible etiology of his significant isolated right small finger deformity contracture.  He will follow up after his MRI for re-evaluation.  Of note, the patient and I discussed possible surgical options pending his MRI results.  We discussed possibly a 2 stage correction including a digit widget and or subsequent soft tissue procedure. The patient inquired as to whether amputation primarily would be an option.  I did inform him that yes this would in fact be an option should that be the afternoon he would wish to pursue.  He will think about this further and return after his MRI for re-evaluation.          Jens Dockery M.D.     Please be aware that this note has been generated with the assistance of Marcio voice-to-text.  Please excuse any spelling or grammatical errors.

## 2020-01-23 NOTE — LETTER
January 23, 2020      Melinda Yates PA-C  [address]  [facility] [address]           [facility]  [address] , [facility] [address]  [facility] [address]  Phone: [phone]  Fax: [phone]          Patient: Jett Ballard   MR Number: [id number]   YOB: 1965   Date of Visit: 1/23/2020       Dear Melinda Yates:    Thank you for referring Jett Ballard to me for evaluation. Attached you will find relevant portions of my assessment and plan of care.    If you have questions, please do not hesitate to call me. I look forward to following Jett Ballard along with you.    Sincerely,    Jens Dockery MD    Enclosure  CC:  No Recipients    If you would like to receive this communication electronically, please contact [email or web address] or [phone] to request more information on [employer] â€¢ [employer]/ Link access.    For providers and/or their staff who would like to refer a patient to [facility], please contact us through our one-stop-shop provider referral line, [facility], at [phone].    If you feel you have received this communication in error or would no longer like to receive these types of communications, please e-mail [email or web address]

## 2020-01-25 DIAGNOSIS — I10 HYPERTENSION, UNSPECIFIED TYPE: ICD-10-CM

## 2020-01-27 RX ORDER — BENAZEPRIL HYDROCHLORIDE 20 MG/1
TABLET ORAL
Qty: 30 TABLET | Refills: 0 | OUTPATIENT
Start: 2020-01-27

## 2020-01-31 ENCOUNTER — EXTERNAL CHRONIC CARE MANAGEMENT (OUTPATIENT)
Dept: PRIMARY CARE CLINIC | Facility: CLINIC | Age: 55
End: 2020-01-31
Payer: MEDICARE

## 2020-01-31 PROCEDURE — 99999 PR STA SHADOW: CPT | Mod: PBBFAC,,, | Performed by: NURSE PRACTITIONER

## 2020-01-31 PROCEDURE — 99999 PR STA SHADOW: ICD-10-PCS | Mod: PBBFAC,,, | Performed by: NURSE PRACTITIONER

## 2020-01-31 PROCEDURE — 99490 CHRNC CARE MGMT STAFF 1ST 20: CPT | Mod: S$PBB | Performed by: NURSE PRACTITIONER

## 2020-01-31 PROCEDURE — 99490 CHRNC CARE MGMT STAFF 1ST 20: CPT | Mod: PBBFAC | Performed by: NURSE PRACTITIONER

## 2020-02-05 ENCOUNTER — TELEPHONE (OUTPATIENT)
Dept: ORTHOPEDICS | Facility: CLINIC | Age: 55
End: 2020-02-05

## 2020-02-05 NOTE — TELEPHONE ENCOUNTER
Called patient in regard to appt on 2/6/2020 with Dr. Dockery. Patient is scheduled to come in for an MRI follow up, but he has not gotten the MRI done. I left a detailed message for him letting him know that his appointment for tomorrow will be cancelled, and that we need to get him rescheduled for the MRI as well as the follow up. Left a call back number so we can discuss.

## 2020-02-11 ENCOUNTER — OFFICE VISIT (OUTPATIENT)
Dept: INTERNAL MEDICINE | Facility: CLINIC | Age: 55
End: 2020-02-11
Payer: MEDICARE

## 2020-02-11 VITALS
RESPIRATION RATE: 18 BRPM | OXYGEN SATURATION: 97 % | SYSTOLIC BLOOD PRESSURE: 124 MMHG | DIASTOLIC BLOOD PRESSURE: 86 MMHG | WEIGHT: 201 LBS | HEART RATE: 104 BPM | HEIGHT: 74 IN | BODY MASS INDEX: 25.8 KG/M2

## 2020-02-11 DIAGNOSIS — I88.9 AXILLARY LYMPHADENITIS: ICD-10-CM

## 2020-02-11 DIAGNOSIS — L30.4 PRURITIC INTERTRIGO: Primary | ICD-10-CM

## 2020-02-11 PROCEDURE — 99213 OFFICE O/P EST LOW 20 MIN: CPT | Mod: S$PBB,,, | Performed by: NURSE PRACTITIONER

## 2020-02-11 PROCEDURE — 99213 PR OFFICE/OUTPT VISIT, EST, LEVL III, 20-29 MIN: ICD-10-PCS | Mod: S$PBB,,, | Performed by: NURSE PRACTITIONER

## 2020-02-11 PROCEDURE — 99999 PR PBB SHADOW E&M-EST. PATIENT-LVL IV: CPT | Mod: PBBFAC,,, | Performed by: NURSE PRACTITIONER

## 2020-02-11 PROCEDURE — 99999 PR PBB SHADOW E&M-EST. PATIENT-LVL IV: ICD-10-PCS | Mod: PBBFAC,,, | Performed by: NURSE PRACTITIONER

## 2020-02-11 PROCEDURE — 99214 OFFICE O/P EST MOD 30 MIN: CPT | Mod: PBBFAC,PN | Performed by: NURSE PRACTITIONER

## 2020-02-11 RX ORDER — CLOTRIMAZOLE AND BETAMETHASONE DIPROPIONATE 10; .64 MG/G; MG/G
CREAM TOPICAL 2 TIMES DAILY
Qty: 45 G | Refills: 2 | Status: SHIPPED | OUTPATIENT
Start: 2020-02-11 | End: 2020-04-22

## 2020-02-11 NOTE — PROGRESS NOTES
"Subjective:       Patient ID: Jett Ballard is a 54 y.o. male.    Chief Complaint: Rash (on abdomen- x 1.5 weeks)    Patient is known, to me and presents with   Chief Complaint   Patient presents with    Rash     on abdomen- x 1.5 weeks   .  Denies chest pain and shortness of breath.  Patient presents with itchy rash to abdominal regions and right leg. Has been using otc products. Also wants a second opinion in regards to axillary lymphadenitis to left outer breast region.    HPI  Review of Systems   Constitutional: Negative.    Respiratory: Negative.    Cardiovascular: Negative.    Skin: Positive for rash.       Objective:      Physical Exam   Constitutional: He appears well-developed and well-nourished. No distress.   Neck: Normal range of motion. Neck supple. No JVD present. No tracheal deviation present. No thyromegaly present.   Cardiovascular: Normal rate, regular rhythm and normal heart sounds.   No murmur heard.  Pulmonary/Chest: Effort normal and breath sounds normal. He has no wheezes.   Lymphadenopathy:     He has no cervical adenopathy.   Skin: Skin is warm and dry. Capillary refill takes less than 2 seconds. Rash noted. Rash is maculopapular. He is not diaphoretic. There is erythema. No pallor.        Maculopapular rash to affected areas with satellite lesions       Assessment:       1. Pruritic intertrigo    2. Axillary lymphadenitis        Plan:   Jett was seen today for rash.    Diagnoses and all orders for this visit:    Pruritic intertrigo  -     clotrimazole-betamethasone 1-0.05% (LOTRISONE) cream; Apply topically 2 (two) times daily.    Axillary lymphadenitis  -     Ambulatory referral/consult to General Surgery; Future    "This note will not be shared with the patient."  Use cream as indicated   If no improvement will let mek now  rtc as scheduled   "

## 2020-02-18 ENCOUNTER — OFFICE VISIT (OUTPATIENT)
Dept: SURGERY | Facility: CLINIC | Age: 55
End: 2020-02-18
Payer: MEDICARE

## 2020-02-18 VITALS
BODY MASS INDEX: 26.08 KG/M2 | HEIGHT: 74 IN | OXYGEN SATURATION: 96 % | DIASTOLIC BLOOD PRESSURE: 80 MMHG | WEIGHT: 203.25 LBS | HEART RATE: 92 BPM | SYSTOLIC BLOOD PRESSURE: 130 MMHG | RESPIRATION RATE: 20 BRPM

## 2020-02-18 DIAGNOSIS — N64.4 BREAST PAIN, LEFT: ICD-10-CM

## 2020-02-18 PROCEDURE — 99999 PR STA SHADOW: CPT | Mod: PBBFAC,,, | Performed by: SURGERY

## 2020-02-18 PROCEDURE — 99204 OFFICE O/P NEW MOD 45 MIN: CPT | Mod: S$PBB | Performed by: SURGERY

## 2020-02-18 PROCEDURE — 99213 OFFICE O/P EST LOW 20 MIN: CPT | Mod: PBBFAC | Performed by: SURGERY

## 2020-02-18 PROCEDURE — 99999 PR STA SHADOW: ICD-10-PCS | Mod: PBBFAC,,, | Performed by: SURGERY

## 2020-02-18 NOTE — PROGRESS NOTES
Subjective:       Patient ID: Jett Ballard is a 54 y.o. male.    Chief Complaint: Pre-op Exam    Review of patient's allergies indicates:   Allergen Reactions    Adhesive Rash    Latex, natural rubber Rash     54-year-old male with chronic left breast and axillary pain.  Patient has been having this for about 6 months now.  He had a left mammogram and ultrasound which were negative. Patient was seen at [facility] and was told he may have some left axillary lymphadenitis.  Patient denies any redness or fevers.  No breast masses.  Patient was referred to me for a 2nd opinion.  On exam his breast and axillary exam is normal.  Unfortunately, I do not have any surgical treatment or indications at this time.    Past Medical History:   Diagnosis Date    Hypercholesteremia     Hypertension     Left sided numbness     Leg weakness     Other and unspecified hyperlipidemia     Positional lightheadedness     Stroke 2014    Tremor     Weakness      Past Surgical History:   Procedure Laterality Date    BACK SURGERY      COLONOSCOPY N/A 7/19/2018    Procedure: COLONOSCOPY;  Surgeon: Patric Carrera MD;  Location: [facility];  Service: Endoscopy;  Laterality: N/A;    TONSILLECTOMY       Family History   Problem Relation Age of Onset    Heart disease Father     Cancer Father      Social History     Socioeconomic History    Marital status: Single     Spouse name: Not on file    Number of children: Not on file    Years of education: 11    Highest education level: Not on file   Occupational History    Not on file   Social Needs    Financial resource strain: Not on file    Food insecurity:     Worry: Not on file     Inability: Not on file    Transportation needs:     Medical: Not on file     Non-medical: Not on file   Tobacco Use    Smoking status: Never Smoker    Smokeless tobacco: Current User     Types: Snuff    Tobacco comment: 33 yr history of dip   Substance and Sexual Activity    Alcohol  use: No     Alcohol/week: 0.0 standard drinks    Drug use: No    Sexual activity: Yes     Partners: Female     Birth control/protection: None   Lifestyle    Physical activity:     Days per week: Not on file     Minutes per session: Not on file    Stress: Not on file   Relationships    Social connections:     Talks on phone: Not on file     Gets together: Not on file     Attends [employer] service: Not on file     Active member of club or organization: Not on file     Attends meetings of clubs or organizations: Not on file     Relationship status: Not on file   Other Topics Concern    Not on file   Social History Narrative    Not on file     Vitals:    02/18/20 0859   BP: 130/80   Pulse: 92   Resp: 20       Review of Systems   Musculoskeletal:        Patient complains of left lateral breast pain and pain in the left axilla   All other systems reviewed and are negative.      Objective:      Physical Exam   Constitutional: He is oriented to person, place, and time. He appears well-developed and well-nourished.   HENT:   Head: Normocephalic.   Eyes: Pupils are equal, round, and reactive to light.   Neck: Normal range of motion.   Pulmonary/Chest: Effort normal.   Abdominal: Soft.   Musculoskeletal: Normal range of motion.   Neurological: He is alert and oriented to person, place, and time.   Skin: Skin is warm and dry.   Left breast slightly larger than the right but no obvious masses.  No left axillary adenopathy palpated.   Psychiatric: He has a normal mood and affect.   Nursing note and vitals reviewed.      Assessment:       1. Breast pain, left        Plan:         Jett was seen today for pre-op exam.    Diagnoses and all orders for this visit:    Breast pain, left     I have nothing surgical to offer.  Aleve was recommended p.r.n. for any type of inflammation.    No follow-ups on file.          Sumit Del Cid Jr, MD

## 2020-02-18 NOTE — LETTER
February 18, 2020      Yanci Lowe, DAVY  [address]  [facility] [address]           [facility]  [address] [place] [address]  Phone: [phone]          Patient: Jett Ballard   MR Number: [id number]   YOB: 1965   Date of Visit: 2/18/2020       Dear Yanci Lowe:    Thank you for referring Jett Ballard to me for evaluation. Attached you will find relevant portions of my assessment and plan of care.    If you have questions, please do not hesitate to call me. I look forward to following Jett Ballard along with you.    Sincerely,    Sumit Del Cid Jr., MD    Enclosure  CC:  No Recipients    If you would like to receive this communication electronically, please contact [email or web address] or [phone] to request more information on [employer] access.    For providers and/or their staff who would like to refer a patient to [facility], please contact us through our one-stop-shop provider referral line, [facility], at [phone].    If you feel you have received this communication in error or would no longer like to receive these types of communications, please e-mail [email or web address]

## 2020-02-29 ENCOUNTER — EXTERNAL CHRONIC CARE MANAGEMENT (OUTPATIENT)
Dept: PRIMARY CARE CLINIC | Facility: CLINIC | Age: 55
End: 2020-02-29
Payer: MEDICARE

## 2020-02-29 PROCEDURE — 99490 PR CHRONIC CARE MGMT, 1ST 20 MIN: ICD-10-PCS | Mod: S$PBB,,, | Performed by: NURSE PRACTITIONER

## 2020-02-29 PROCEDURE — 99490 CHRNC CARE MGMT STAFF 1ST 20: CPT | Mod: PBBFAC | Performed by: INTERNAL MEDICINE

## 2020-02-29 PROCEDURE — 99490 CHRNC CARE MGMT STAFF 1ST 20: CPT | Mod: S$PBB,,, | Performed by: NURSE PRACTITIONER

## 2020-03-27 ENCOUNTER — OFFICE VISIT (OUTPATIENT)
Dept: INTERNAL MEDICINE | Facility: CLINIC | Age: 55
End: 2020-03-27
Payer: MEDICARE

## 2020-03-27 VITALS
HEIGHT: 74 IN | WEIGHT: 198 LBS | RESPIRATION RATE: 18 BRPM | BODY MASS INDEX: 25.41 KG/M2 | OXYGEN SATURATION: 98 % | TEMPERATURE: 97 F | HEART RATE: 88 BPM | SYSTOLIC BLOOD PRESSURE: 124 MMHG | DIASTOLIC BLOOD PRESSURE: 86 MMHG

## 2020-03-27 DIAGNOSIS — R60.0 LOCALIZED EDEMA: ICD-10-CM

## 2020-03-27 DIAGNOSIS — I10 BENIGN ESSENTIAL HTN: ICD-10-CM

## 2020-03-27 DIAGNOSIS — M48.02 MYELOPATHY CONCURRENT WITH AND DUE TO SPINAL STENOSIS OF CERVICAL REGION: ICD-10-CM

## 2020-03-27 DIAGNOSIS — G99.2 MYELOPATHY CONCURRENT WITH AND DUE TO SPINAL STENOSIS OF CERVICAL REGION: ICD-10-CM

## 2020-03-27 PROCEDURE — 99214 PR OFFICE/OUTPT VISIT, EST, LEVL IV, 30-39 MIN: ICD-10-PCS | Mod: S$PBB,,, | Performed by: NURSE PRACTITIONER

## 2020-03-27 PROCEDURE — 99214 OFFICE O/P EST MOD 30 MIN: CPT | Mod: S$PBB,,, | Performed by: NURSE PRACTITIONER

## 2020-03-27 PROCEDURE — 99999 PR PBB SHADOW E&M-EST. PATIENT-LVL IV: CPT | Mod: PBBFAC,,, | Performed by: NURSE PRACTITIONER

## 2020-03-27 PROCEDURE — 99999 PR PBB SHADOW E&M-EST. PATIENT-LVL IV: ICD-10-PCS | Mod: PBBFAC,,, | Performed by: NURSE PRACTITIONER

## 2020-03-27 PROCEDURE — 99214 OFFICE O/P EST MOD 30 MIN: CPT | Mod: PBBFAC,PN | Performed by: NURSE PRACTITIONER

## 2020-03-27 RX ORDER — HYDROCHLOROTHIAZIDE 25 MG/1
25 TABLET ORAL DAILY
Qty: 30 TABLET | Refills: 2 | Status: SHIPPED | OUTPATIENT
Start: 2020-03-27 | End: 2020-06-18 | Stop reason: SDUPTHER

## 2020-03-27 NOTE — PROGRESS NOTES
Subjective:       Patient ID: Jett Ballard is a 54 y.o. male.    Chief Complaint: Edema (in both legs- x 3 days)    Patient is known, to me and presents with   Chief Complaint   Patient presents with    Edema     in both legs- x 3 days   .  Denies chest pain and shortness of breath.  Patient presents with new onset edema to LE since past two days. States that he does not eat any salt. Seeing Dr. Armijo next Monday. He reports that he si having some issues with left hand and finger numbness and low back problems. His last mri spine showed severe arthritis in the cervical spine   HPI  Review of Systems   Constitutional: Negative.  Negative for activity change, appetite change, chills, diaphoresis, fatigue, fever and unexpected weight change.   HENT: Negative.  Negative for congestion, ear discharge, ear pain, facial swelling, hearing loss, nosebleeds, postnasal drip, rhinorrhea, sinus pressure, sneezing, sore throat, tinnitus, trouble swallowing and voice change.    Eyes: Negative.  Negative for photophobia, pain, discharge, redness, itching and visual disturbance.   Respiratory: Negative.  Negative for apnea, cough, choking, chest tightness, shortness of breath, wheezing and stridor.    Cardiovascular: Positive for leg swelling. Negative for chest pain and palpitations.   Gastrointestinal: Negative for abdominal distention, abdominal pain, anal bleeding, blood in stool, constipation, diarrhea, nausea and vomiting.   Genitourinary: Negative.  Negative for difficulty urinating, discharge, dysuria, enuresis, flank pain, frequency, hematuria, penile pain, penile swelling, scrotal swelling, testicular pain and urgency.   Musculoskeletal: Positive for arthralgias, back pain and neck pain. Negative for gait problem, joint swelling, myalgias and neck stiffness.   Skin: Negative.  Negative for color change, pallor, rash and wound.   Neurological: Negative for dizziness, tremors, seizures, syncope, facial asymmetry, speech  difficulty, weakness, light-headedness, numbness and headaches.   Hematological: Negative for adenopathy. Does not bruise/bleed easily.   Psychiatric/Behavioral: Negative.  Negative for agitation, dysphoric mood, sleep disturbance and suicidal ideas. The patient is not nervous/anxious.        Objective:      Physical Exam   Constitutional: He is oriented to person, place, and time. He appears well-developed and well-nourished. No distress.   HENT:   Head: Normocephalic and atraumatic.   Right Ear: External ear normal.   Left Ear: External ear normal.   Nose: Nose normal.   Mouth/Throat: Oropharynx is clear and moist. No oropharyngeal exudate.   Eyes: Pupils are equal, round, and reactive to light. Conjunctivae and EOM are normal. Right eye exhibits no discharge. Left eye exhibits no discharge.   Neck: Normal range of motion. Neck supple. No JVD present. No tracheal deviation present. No thyromegaly present.   Cardiovascular: Normal rate, regular rhythm, normal heart sounds and intact distal pulses. Exam reveals no gallop and no friction rub.   No murmur heard.  Pulmonary/Chest: Effort normal and breath sounds normal. No stridor. No respiratory distress. He has no wheezes. He has no rales. He exhibits no tenderness.   Abdominal: Soft. Bowel sounds are normal. He exhibits no distension. There is no tenderness. There is no rebound and no guarding.   Musculoskeletal: He exhibits no edema or tenderness.   No edema noted today to LE   Lymphadenopathy:     He has no cervical adenopathy.   Neurological: He is alert and oriented to person, place, and time. He has normal reflexes. He displays normal reflexes. No cranial nerve deficit. He exhibits normal muscle tone. Coordination normal.   Skin: Skin is warm and dry. Capillary refill takes less than 2 seconds. No rash noted. He is not diaphoretic. No erythema. No pallor.   Psychiatric: He has a normal mood and affect. His behavior is normal. Judgment and thought content  "normal.   Nursing note and vitals reviewed.      Assessment:       1. Localized edema    2. Myelopathy concurrent with and due to spinal stenosis of cervical region    3. Benign essential HTN        Plan:   Jett was seen today for edema.    Diagnoses and all orders for this visit:    Localized edema  -     hydroCHLOROthiazide (HYDRODIURIL) 25 MG tablet; Take 1 tablet (25 mg total) by mouth once daily.    Myelopathy concurrent with and due to spinal stenosis of cervical region  -     Ambulatory referral/consult to Neurosurgery; Future    Benign essential HTN  -     hydroCHLOROthiazide (HYDRODIURIL) 25 MG tablet; Take 1 tablet (25 mg total) by mouth once daily.  -     EKG 12-lead; Future    "This note will not be shared with the patient."  EKG nsr with no ischemia  He will see Dr. Armijo on Monday  Also will send to neurosx for further evaluation of spinal stenosis   No salt and watch for hidden salts  Elevate legs  rtc as scheduled     "

## 2020-03-27 NOTE — PATIENT INSTRUCTIONS
General Neck and Back Pain    Both neck and back pain are usually caused by injury to the muscles or ligaments of the spine. Sometimes the disks that separate each bone of the spine may cause pain by pressing on a nearby nerve. Back and neck pain may appear after a sudden twisting or bending force (such as in a car accident), or sometimes after a simple awkward movement. In either case, muscle spasm is often present and adds to the pain.  Acute neck and back pain usually gets better in 1 to 2 weeks. Pain related to disk disease, arthritis in the spinal joints or spinal stenosis (narrowing of the spinal canal) can become chronic and last for months or years.  Back and neck pain are common problems. Most people feel better in 1 or 2 weeks, and most of the rest in 1 to 2 months. Most people can remain active.  People experience and describe pain differently.  · Pain can be sharp, stabbing, shooting, aching, cramping, or burning  · Movement, standing, bending, lifting, sitting, or walking may worsen the pain  · Pain can be localized to one spot or area, or it can be more generalized  · Pain can spread or radiate upwards, downwards, to the front, or go down your arms  · Muscle spasm may occur.  Most of the time mechanical problems with the muscles or spine cause the pain. it is usually caused by an injury, whether known or not, to the muscles or ligaments. While illnesses can cause back pain, it is usually not caused by a serious illness. Pain is usually related to physical activity, whether sports, exercise, work, or normal activity. Sometimes it can occur without an identifiable cause. This can happen simply by stretching or moving wrong, without noting pain at the time. Other causes include:  · Overexertion, lifting, pushing, pulling incorrectly or too aggressively.  · Sudden twisting, bending or stretching from an accident (car or fall), or accidental movement.  · Poor posture  · Poor conditioning, lack of regular  exercise  · Spinal disc disease or arthritis  · Stress  · Pregnancy, or illness like appendicitis, bladder or kidney infection, pelvic infections   Home care  · For neck pain: Use a comfortable pillow that supports the head and keeps the spine in a neutral position. The position of the head should not be tilted forward or backward.  · When in bed, try to find a position of comfort. A firm mattress is best. Try lying flat on your back with pillows under your knees. You can also try lying on your side with your knees bent up towards your chest and a pillow between your knees.  · At first, do not try to stretch out the sore spots. If there is a strain, it is not like the good soreness you get after exercising without an injury. In this case, stretching may make it worse.  · Avoid prolonged sitting, long car rides or travel. This puts more stress on the lower back than standing or walking.  · During the first 24 to 72 hours after an injury, apply an ice pack to the painful area for 20 minutes and then remove it for 20 minutes over a period of 60 to 90 minutes or several times a day.   · You can alternate ice and heat therapies. Talk with your healthcare provider about the best treatment for your back or neck pain. As a safety precaution, do not use a heating pad at bedtime. Sleeping with a heating pad can lead to skin burns or tissue damage.  · Therapeutic massage can help relax the back and neck muscles without stretching them.  · Be aware of safe lifting methods and do not lift anything over 15 pounds until all the pain is gone.  Medications  Talk to your healthcare provider before using medicine, especially if you have other medical problems or are taking other medicines.  · You may use over-the-counter medicine to control pain, unless another pain medicine was prescribed. If you have chronic conditions like diabetes, liver or kidney disease, stomach ulcers,  gastrointestinal bleeding, or are taking blood thinner  medicines.  · Be careful if you are given pain medicines, narcotics, or medicine for muscle spasm. They can cause drowsiness, and can affect your coordination, reflexes, and judgment. Do not drive or operate heavy machinery.  Follow-up care  Follow up with your healthcare provider, or as advised. Physical therapy or further tests may be needed.  If X-rays were taken, you will be notified of any new findings that may affect your care.  Call 911  Seek emergency medical care if any of the following occur:  · Trouble breathing  · Confusion  · Very drowsy or trouble awakening  · Fainting or loss of consciousness  · Rapid or very slow heart rate  · Loss of bowel or bladder control  When to seek medical advice  Call your healthcare provider right away if any of these occur:  · Pain becomes worse or spreads into your arms or legs  · Weakness, numbness or pain in one or both arms or legs  · Numbness in the groin area  · Difficulty walking  · Fever of 100.4ºF (38ºC) or higher, or as directed by your healthcare provider  Date Last Reviewed: 7/1/2016 © 2000-2017 [employer]. [address] [place] [address], [place] [address]. All rights reserved. This information is not intended as a substitute for professional medical care. Always follow your healthcare professional's instructions.

## 2020-03-31 ENCOUNTER — EXTERNAL CHRONIC CARE MANAGEMENT (OUTPATIENT)
Dept: PRIMARY CARE CLINIC | Facility: CLINIC | Age: 55
End: 2020-03-31
Payer: MEDICARE

## 2020-03-31 PROCEDURE — 99490 CHRNC CARE MGMT STAFF 1ST 20: CPT | Mod: S$PBB,,, | Performed by: NURSE PRACTITIONER

## 2020-03-31 PROCEDURE — 99490 PR CHRONIC CARE MGMT, 1ST 20 MIN: ICD-10-PCS | Mod: S$PBB,,, | Performed by: NURSE PRACTITIONER

## 2020-03-31 PROCEDURE — 99490 CHRNC CARE MGMT STAFF 1ST 20: CPT | Mod: PBBFAC | Performed by: INTERNAL MEDICINE

## 2020-04-01 ENCOUNTER — TELEPHONE (OUTPATIENT)
Dept: INTERNAL MEDICINE | Facility: CLINIC | Age: 55
End: 2020-04-01

## 2020-04-01 NOTE — TELEPHONE ENCOUNTER
----- Message from Jyoti Howe MA sent at 4/1/2020  8:39 AM CDT -----  Contact: Patient  Patient called regarding his labs from [facility] (On your desk)  Cardiologist told him to contact Yanci regarding his kidney Function.     Please Advise:  [phone]

## 2020-04-21 ENCOUNTER — TELEPHONE (OUTPATIENT)
Dept: INTERNAL MEDICINE | Facility: CLINIC | Age: 55
End: 2020-04-21

## 2020-04-21 NOTE — TELEPHONE ENCOUNTER
Pt states the cream you prescribed for the rash is not helping. He is wondering if you can send in something different. Rash is around waste line.

## 2020-04-22 RX ORDER — BETAMETHASONE DIPROPIONATE 0.5 MG/G
CREAM TOPICAL 2 TIMES DAILY
Qty: 45 G | Refills: 1 | Status: SHIPPED | OUTPATIENT
Start: 2020-04-22 | End: 2023-09-27

## 2020-04-30 ENCOUNTER — EXTERNAL CHRONIC CARE MANAGEMENT (OUTPATIENT)
Dept: PRIMARY CARE CLINIC | Facility: CLINIC | Age: 55
End: 2020-04-30
Payer: MEDICARE

## 2020-04-30 PROCEDURE — 99490 CHRNC CARE MGMT STAFF 1ST 20: CPT | Mod: S$PBB,,, | Performed by: NURSE PRACTITIONER

## 2020-04-30 PROCEDURE — 99490 CHRNC CARE MGMT STAFF 1ST 20: CPT | Mod: PBBFAC | Performed by: INTERNAL MEDICINE

## 2020-04-30 PROCEDURE — 99490 PR CHRONIC CARE MGMT, 1ST 20 MIN: ICD-10-PCS | Mod: S$PBB,,, | Performed by: NURSE PRACTITIONER

## 2020-05-07 ENCOUNTER — OFFICE VISIT (OUTPATIENT)
Dept: INTERNAL MEDICINE | Facility: CLINIC | Age: 55
End: 2020-05-07
Payer: MEDICARE

## 2020-05-07 VITALS
WEIGHT: 198.88 LBS | HEART RATE: 87 BPM | OXYGEN SATURATION: 98 % | DIASTOLIC BLOOD PRESSURE: 86 MMHG | TEMPERATURE: 96 F | RESPIRATION RATE: 16 BRPM | BODY MASS INDEX: 25.52 KG/M2 | HEIGHT: 74 IN | SYSTOLIC BLOOD PRESSURE: 124 MMHG

## 2020-05-07 DIAGNOSIS — R53.83 FATIGUE, UNSPECIFIED TYPE: ICD-10-CM

## 2020-05-07 DIAGNOSIS — F33.41 MAJOR DEPRESSIVE DISORDER, RECURRENT, IN PARTIAL REMISSION: ICD-10-CM

## 2020-05-07 PROCEDURE — 99214 OFFICE O/P EST MOD 30 MIN: CPT | Mod: PBBFAC,PN,25 | Performed by: NURSE PRACTITIONER

## 2020-05-07 PROCEDURE — 99999 PR PBB SHADOW E&M-EST. PATIENT-LVL IV: CPT | Mod: PBBFAC,,, | Performed by: NURSE PRACTITIONER

## 2020-05-07 PROCEDURE — 99999 PR PBB SHADOW E&M-EST. PATIENT-LVL IV: ICD-10-PCS | Mod: PBBFAC,,, | Performed by: NURSE PRACTITIONER

## 2020-05-07 PROCEDURE — 99214 OFFICE O/P EST MOD 30 MIN: CPT | Mod: S$PBB,,, | Performed by: NURSE PRACTITIONER

## 2020-05-07 PROCEDURE — 96372 THER/PROPH/DIAG INJ SC/IM: CPT | Mod: PBBFAC,PN

## 2020-05-07 PROCEDURE — 99214 PR OFFICE/OUTPT VISIT, EST, LEVL IV, 30-39 MIN: ICD-10-PCS | Mod: S$PBB,,, | Performed by: NURSE PRACTITIONER

## 2020-05-07 RX ORDER — CYANOCOBALAMIN 1000 UG/ML
1000 INJECTION, SOLUTION INTRAMUSCULAR; SUBCUTANEOUS
Status: COMPLETED | OUTPATIENT
Start: 2020-05-07 | End: 2020-05-07

## 2020-05-07 RX ADMIN — CYANOCOBALAMIN 1000 MCG: 1000 INJECTION, SOLUTION INTRAMUSCULAR at 12:05

## 2020-05-07 NOTE — PATIENT INSTRUCTIONS
Depression  Depression is one of the most common mental health problems today. It is not just a state of unhappiness or sadness. It is a true disease. The cause seems to be related to a decrease in chemicals that transmit signals in the brain. Having a family history of depression, alcoholism, or suicide increases the risk. Chronic illness, chronic pain, migraine headaches and high emotional stress also increase the risk.  Depression is something we tend to recognize in others, but may have a hard time seeing in ourselves. It can show in many physical and emotional ways:  · Loss of appetite  · Over-eating  · Not being able to sleep  · Sleeping too much  · Tiredness not related to physical exertion  · Restlessness or irritability  · Slowness of movement or speech  · Feeling depressed or withdrawn  · Loss of interest in things you once enjoyed  · Trouble concentrating, poor memory, trouble making decisions  · Thoughts of harming or killing oneself, or thoughts that life is not worth living  · Low self-esteem  The treatment for depression may include both medicine and psychotherapy. Antidepressants can reduce suffering and can improve the ability to function during the depressed period. Therapy can offer emotional support and help you understand emotional factors that may be causing the depression.  Home care  · On-going care and support helps people manage this disease.  Find a healthcare provider and therapist who meet your needs. Seek help when you feel like you may be getting ill.  · Be kind to yourself. Make it a point to do things that you enjoy (gardening, walking in nature, going to a movie, etc.). Reward yourself for small successes.  · Take care of your physical body. Eat a balanced diet (low in saturated fat and high in fruits and vegetables). Exercise at least 3 times a week for 30 minutes. Even mild-moderate exercise (like brisk walking) can make you feel better.  · Avoid alcohol, which can make  depression worse.  · Take medicine as prescribed.  · Tell each of your healthcare providers about all of the prescription drugs, over-the-counter medicines, vitamins, and supplements you take. Certain supplements interact with medicines and can result in dangerous side effects. Ask your pharmacist when you have questions about drug interactions.  · Talk with your family and trusted friends about your feelings and thoughts. Ask them to help you recognize behavior changes early so you can get help and, if needed, medicine can be adjusted.  Follow-up care  Follow up with your healthcare provider, or as advised.  Call 911  Call 911 if you:  · Have suicidal thoughts, a suicide plan, and the means to carry out the plan  · Have trouble breathing  · Are very confused  · Feel very drowsy or have trouble awakening  · Faint or lose consciousness  · Have new chest pain that becomes more severe, lasts longer, or spreads into your shoulder, arm, neck, jaw or back  When to seek medical advice  Call your healthcare provider right away if any of these occur:  · Feeling extreme depression, fear, anxiety, or anger toward yourself or others  · Feeling out of control  · Feeling that you may try to harm yourself or another  · Hearing voices that others do not hear  · Seeing things that others do not see  · Cant sleep or eat for 3 days in a row  · Friends or family express concern over your behavior and ask you to seek help  Date Last Reviewed: 9/29/2015  © 6516-9615 [employer]. [address] [place] [address], [place] [address]. All rights reserved. This information is not intended as a substitute for professional medical care. Always follow your healthcare professional's instructions.

## 2020-05-07 NOTE — PROGRESS NOTES
Subjective:       Patient ID: Jett Ballard is a 54 y.o. male.    Chief Complaint: Fatigue (no energy)    Patient is known, to me and presents with   Chief Complaint   Patient presents with    Fatigue     no energy   .  Denies chest pain and shortness of breath.  Patient presents with fatigue last two weeks. States that it may be because he is not doing much at home. Recently had blood work with [facility] and said his blood work is good. He is requesting b12 meds   No other s/s noted   HPI  Review of Systems   Constitutional: Positive for fatigue. Negative for activity change, appetite change, chills, diaphoresis, fever and unexpected weight change.   HENT: Negative.  Negative for congestion, ear discharge, ear pain, facial swelling, hearing loss, nosebleeds, postnasal drip, rhinorrhea, sinus pressure, sneezing, sore throat, tinnitus, trouble swallowing and voice change.    Eyes: Negative.  Negative for photophobia, pain, discharge, redness, itching and visual disturbance.   Respiratory: Negative.  Negative for apnea, cough, choking, chest tightness, shortness of breath, wheezing and stridor.    Cardiovascular: Negative.  Negative for chest pain, palpitations and leg swelling.   Gastrointestinal: Negative for abdominal distention, abdominal pain, anal bleeding, blood in stool, constipation, diarrhea, nausea and vomiting.   Genitourinary: Negative.  Negative for difficulty urinating, discharge, dysuria, enuresis, flank pain, frequency, hematuria, penile pain, penile swelling, scrotal swelling, testicular pain and urgency.   Musculoskeletal: Negative.  Negative for arthralgias, back pain, gait problem, joint swelling, myalgias, neck pain and neck stiffness.   Skin: Negative.  Negative for color change, pallor, rash and wound.   Neurological: Negative for dizziness, tremors, seizures, syncope, facial asymmetry, speech difficulty, weakness, light-headedness, numbness and headaches.   Hematological: Negative for  adenopathy. Does not bruise/bleed easily.   Psychiatric/Behavioral: Negative.  Negative for agitation, dysphoric mood, sleep disturbance and suicidal ideas. The patient is not nervous/anxious.        Objective:      Physical Exam   Constitutional: He is oriented to person, place, and time. He appears well-developed and well-nourished. No distress.   HENT:   Head: Normocephalic and atraumatic.   Right Ear: External ear normal.   Left Ear: External ear normal.   Nose: Nose normal.   Mouth/Throat: Oropharynx is clear and moist. No oropharyngeal exudate.   Eyes: Pupils are equal, round, and reactive to light. Conjunctivae and EOM are normal. Right eye exhibits no discharge. Left eye exhibits no discharge.   Neck: Normal range of motion. Neck supple. No JVD present. No tracheal deviation present. No thyromegaly present.   Cardiovascular: Normal rate, regular rhythm, normal heart sounds and intact distal pulses. Exam reveals no gallop and no friction rub.   No murmur heard.  Pulmonary/Chest: Effort normal and breath sounds normal. No stridor. No respiratory distress. He has no wheezes. He has no rales. He exhibits no tenderness.   Abdominal: Soft. Bowel sounds are normal. He exhibits no distension. There is no tenderness. There is no rebound and no guarding.   Musculoskeletal: Normal range of motion. He exhibits no edema or tenderness.   Lymphadenopathy:     He has no cervical adenopathy.   Neurological: He is alert and oriented to person, place, and time. He has normal reflexes. He displays normal reflexes. No cranial nerve deficit. He exhibits normal muscle tone. Coordination normal.   Skin: Skin is warm and dry. Capillary refill takes less than 2 seconds. No rash noted. He is not diaphoretic. No erythema. No pallor.   Psychiatric: He has a normal mood and affect. His behavior is normal. Judgment and thought content normal.   Negative SI/HI noted   Nursing note and vitals reviewed.      Assessment:       1. Fatigue,  "unspecified type    2. Major depressive disorder, recurrent, in partial remission        Plan:   Jett was seen today for fatigue.    Diagnoses and all orders for this visit:    Fatigue, unspecified type  -     cyanocobalamin injection 1,000 mcg    Major depressive disorder, recurrent, in partial remission    "This note will not be shared with the patient."  Continue with plan of care  No need to have blood work at this time  Take b12 sl daily   rtc as scheduled  "

## 2020-05-30 ENCOUNTER — HOSPITAL ENCOUNTER (EMERGENCY)
Facility: HOSPITAL | Age: 55
Discharge: HOME | End: 2020-05-30
Attending: EMERGENCY MEDICINE
Payer: MEDICARE

## 2020-05-30 VITALS
RESPIRATION RATE: 18 BRPM | DIASTOLIC BLOOD PRESSURE: 77 MMHG | TEMPERATURE: 98 F | SYSTOLIC BLOOD PRESSURE: 121 MMHG | HEART RATE: 76 BPM | OXYGEN SATURATION: 97 %

## 2020-05-30 DIAGNOSIS — R42 DIZZINESS: ICD-10-CM

## 2020-05-30 LAB
ALBUMIN SERPL BCP-MCNC: 4.1 G/DL (ref 3.5–5.2)
ALP SERPL-CCNC: 104 U/L (ref 55–135)
ALT SERPL W/O P-5'-P-CCNC: 41 U/L (ref 10–44)
AMPHET+METHAMPHET UR QL: NEGATIVE
ANION GAP SERPL CALC-SCNC: 10 MMOL/L (ref 8–16)
AST SERPL-CCNC: 31 U/L (ref 10–40)
BARBITURATES UR QL SCN>200 NG/ML: NEGATIVE
BASOPHILS # BLD AUTO: 0.03 K/UL (ref 0–0.2)
BASOPHILS NFR BLD: 0.5 % (ref 0–1.9)
BENZODIAZ UR QL SCN>200 NG/ML: NEGATIVE
BILIRUB SERPL-MCNC: 0.5 MG/DL (ref 0.1–1)
BILIRUB UR QL STRIP: NEGATIVE
BNP SERPL-MCNC: <10 PG/ML (ref 0–99)
BUN SERPL-MCNC: 19 MG/DL (ref 6–20)
BZE UR QL SCN: NEGATIVE
CALCIUM SERPL-MCNC: 9.6 MG/DL (ref 8.7–10.5)
CANNABINOIDS UR QL SCN: NEGATIVE
CHLORIDE SERPL-SCNC: 102 MMOL/L (ref 95–110)
CK MB SERPL-MCNC: 1 NG/ML (ref 0.1–6.5)
CK MB SERPL-RTO: 0.9 % (ref 0–5)
CK SERPL-CCNC: 112 U/L (ref 20–200)
CK SERPL-CCNC: 112 U/L (ref 20–200)
CLARITY UR: CLEAR
CO2 SERPL-SCNC: 24 MMOL/L (ref 23–29)
COLOR UR: YELLOW
CREAT SERPL-MCNC: 1.1 MG/DL (ref 0.5–1.4)
CREAT UR-MCNC: 238 MG/DL (ref 23–375)
DIFFERENTIAL METHOD: ABNORMAL
EOSINOPHIL # BLD AUTO: 0.2 K/UL (ref 0–0.5)
EOSINOPHIL NFR BLD: 2.5 % (ref 0–8)
ERYTHROCYTE [DISTWIDTH] IN BLOOD BY AUTOMATED COUNT: 12 % (ref 11.5–14.5)
EST. GFR  (AFRICAN AMERICAN): >60 ML/MIN/1.73 M^2
EST. GFR  (NON AFRICAN AMERICAN): >60 ML/MIN/1.73 M^2
GLUCOSE SERPL-MCNC: 104 MG/DL (ref 70–110)
GLUCOSE UR QL STRIP: NEGATIVE
HCT VFR BLD AUTO: 46.1 % (ref 40–54)
HGB BLD-MCNC: 15.4 G/DL (ref 14–18)
HGB UR QL STRIP: NEGATIVE
IMM GRANULOCYTES # BLD AUTO: 0.03 K/UL (ref 0–0.04)
IMM GRANULOCYTES NFR BLD AUTO: 0.5 % (ref 0–0.5)
KETONES UR QL STRIP: ABNORMAL
LEUKOCYTE ESTERASE UR QL STRIP: NEGATIVE
LYMPHOCYTES # BLD AUTO: 0.7 K/UL (ref 1–4.8)
LYMPHOCYTES NFR BLD: 11.3 % (ref 18–48)
MAGNESIUM SERPL-MCNC: 2.1 MG/DL (ref 1.6–2.6)
MCH RBC QN AUTO: 29.6 PG (ref 27–31)
MCHC RBC AUTO-ENTMCNC: 33.4 G/DL (ref 32–36)
MCV RBC AUTO: 89 FL (ref 82–98)
METHADONE UR QL SCN>300 NG/ML: NEGATIVE
MONOCYTES # BLD AUTO: 0.4 K/UL (ref 0.3–1)
MONOCYTES NFR BLD: 6.3 % (ref 4–15)
NEUTROPHILS # BLD AUTO: 4.7 K/UL (ref 1.8–7.7)
NEUTROPHILS NFR BLD: 78.9 % (ref 38–73)
NITRITE UR QL STRIP: NEGATIVE
NRBC BLD-RTO: 0 /100 WBC
OPIATES UR QL SCN: NEGATIVE
PCP UR QL SCN>25 NG/ML: NEGATIVE
PH UR STRIP: 7 [PH] (ref 5–8)
PHOSPHATE SERPL-MCNC: 2.3 MG/DL (ref 2.7–4.5)
PLATELET # BLD AUTO: 264 K/UL (ref 150–350)
PMV BLD AUTO: 10 FL (ref 9.2–12.9)
POTASSIUM SERPL-SCNC: 3.8 MMOL/L (ref 3.5–5.1)
PROT SERPL-MCNC: 7.8 G/DL (ref 6–8.4)
PROT UR QL STRIP: NEGATIVE
RBC # BLD AUTO: 5.2 M/UL (ref 4.6–6.2)
SODIUM SERPL-SCNC: 136 MMOL/L (ref 136–145)
SP GR UR STRIP: 1.02 (ref 1–1.03)
TOXICOLOGY INFORMATION: NORMAL
TROPONIN I SERPL DL<=0.01 NG/ML-MCNC: 0.01 NG/ML (ref 0–0.03)
TSH SERPL DL<=0.005 MIU/L-ACNC: 1.34 UIU/ML (ref 0.4–4)
URN SPEC COLLECT METH UR: ABNORMAL
UROBILINOGEN UR STRIP-ACNC: NEGATIVE EU/DL
WBC # BLD AUTO: 5.91 K/UL (ref 3.9–12.7)

## 2020-05-30 PROCEDURE — 82553 CREATINE MB FRACTION: CPT

## 2020-05-30 PROCEDURE — 80307 DRUG TEST PRSMV CHEM ANLYZR: CPT

## 2020-05-30 PROCEDURE — 82550 ASSAY OF CK (CPK): CPT

## 2020-05-30 PROCEDURE — 84100 ASSAY OF PHOSPHORUS: CPT

## 2020-05-30 PROCEDURE — 93005 ELECTROCARDIOGRAM TRACING: CPT

## 2020-05-30 PROCEDURE — 80053 COMPREHEN METABOLIC PANEL: CPT

## 2020-05-30 PROCEDURE — 99285 EMERGENCY DEPT VISIT HI MDM: CPT | Mod: 25

## 2020-05-30 PROCEDURE — 81003 URINALYSIS AUTO W/O SCOPE: CPT

## 2020-05-30 PROCEDURE — 85025 COMPLETE CBC W/AUTO DIFF WBC: CPT

## 2020-05-30 PROCEDURE — 93010 ELECTROCARDIOGRAM REPORT: CPT | Mod: ,,, | Performed by: INTERNAL MEDICINE

## 2020-05-30 PROCEDURE — 83735 ASSAY OF MAGNESIUM: CPT

## 2020-05-30 PROCEDURE — 83880 ASSAY OF NATRIURETIC PEPTIDE: CPT

## 2020-05-30 PROCEDURE — 84484 ASSAY OF TROPONIN QUANT: CPT

## 2020-05-30 PROCEDURE — 84443 ASSAY THYROID STIM HORMONE: CPT

## 2020-05-30 PROCEDURE — 36415 COLL VENOUS BLD VENIPUNCTURE: CPT

## 2020-05-30 PROCEDURE — 93010 EKG 12-LEAD: ICD-10-PCS | Mod: ,,, | Performed by: INTERNAL MEDICINE

## 2020-05-30 PROCEDURE — 25000003 PHARM REV CODE 250: Performed by: SURGERY

## 2020-05-30 RX ORDER — MECLIZINE HYDROCHLORIDE 25 MG/1
25 TABLET ORAL 3 TIMES DAILY PRN
Qty: 20 TABLET | Refills: 0 | Status: SHIPPED | OUTPATIENT
Start: 2020-05-30 | End: 2020-05-30 | Stop reason: SDUPTHER

## 2020-05-30 RX ORDER — ONDANSETRON 4 MG/1
4 TABLET, ORALLY DISINTEGRATING ORAL EVERY 8 HOURS PRN
Qty: 20 TABLET | Refills: 0 | Status: SHIPPED | OUTPATIENT
Start: 2020-05-30 | End: 2023-10-05

## 2020-05-30 RX ORDER — ONDANSETRON 4 MG/1
4 TABLET, ORALLY DISINTEGRATING ORAL
Status: COMPLETED | OUTPATIENT
Start: 2020-05-30 | End: 2020-05-30

## 2020-05-30 RX ORDER — MECLIZINE HYDROCHLORIDE 25 MG/1
25 TABLET ORAL 3 TIMES DAILY PRN
Qty: 20 TABLET | Refills: 0 | Status: SHIPPED | OUTPATIENT
Start: 2020-05-30 | End: 2023-09-27

## 2020-05-30 RX ORDER — ONDANSETRON 4 MG/1
4 TABLET, ORALLY DISINTEGRATING ORAL EVERY 8 HOURS PRN
Qty: 20 TABLET | Refills: 0 | Status: SHIPPED | OUTPATIENT
Start: 2020-05-30 | End: 2020-05-30 | Stop reason: SDUPTHER

## 2020-05-30 RX ORDER — MECLIZINE HYDROCHLORIDE 25 MG/1
25 TABLET ORAL
Status: COMPLETED | OUTPATIENT
Start: 2020-05-30 | End: 2020-05-30

## 2020-05-30 RX ADMIN — ONDANSETRON 4 MG: 4 TABLET, ORALLY DISINTEGRATING ORAL at 01:05

## 2020-05-30 RX ADMIN — MECLIZINE HYDROCHLORIDE 25 MG: 25 TABLET ORAL at 01:05

## 2020-05-30 NOTE — ED TRIAGE NOTES
Patient reports dizziness that started when he was cooking this afternoon. He reports he had to sit down because he felt like he was going to pass out. Patient then called EMS to bring him to hospital.

## 2020-05-30 NOTE — ED PROVIDER NOTES
[facility]                                                 Chief Complaint  54 y.o. male with Dizziness    History of Present Illness  Jett Ballard presents to the emergency room with dizziness today  Patient had an episode of dizziness today, normal neurologic exam in ER  Patient has had dizziness episodes since his stroke 3-4 years ago per HX  He has normal vision and no facial droop, normal motor strength on exam  Afebrile with good stable vital signs, dizziness better with vertigo today    The history is provided by the patient   device was not used during this ER visit    Past Medical History   -- Hypertension      -- Left sided numbness      -- Leg weakness      -- Other and unspecified hyperlipidemia      -- Positional lightheadedness      -- Stroke      -- Tremor      -- Weakness          Past Surgical History   -- Back surgery             Allergies   -- Adhesive      -- Latex, Natural Rubber      I have reviewed all of this patient's past medical, surgical, family, and social   histories as well as active allergies and medications documented in the  electronic medical record    Review of Systems and Physical Exam      Review of Systems  -- Constitution - no fever, denies fatigue, no weakness, no chills  -- Eyes - no tearing or redness, no visual disturbance  -- Ear, Nose - no tinnitus or earache, no nasal congestion or discharge  -- Mouth,Throat - no sore throat, no toothache, normal voice, normal swallowing  -- Respiratory - denies cough and congestion, no shortness of breath, no GIRALDO  -- Cardiovascular - denies chest pain, no palpitations, denies claudication  -- Gastrointestinal - denies abdominal pain, nausea, vomiting, or diarrhea  -- Genitourinary - no dysuria, denies flank pain, no hematuria, no STD risk  -- Musculoskeletal - denies back pain, negative for trauma or injury  -- Neurological - dizziness, no headache, denies weakness or seizure  -- Skin -  denies pallor, rash, or changes in skin. no hives or welts noted    Vital Signs  Tympanic temperature is 97.5 °F (36.4 °C).   His blood pressure is 121/77 and his pulse is 76.   His respiration is 18 and oxygen saturation is 97%.     Physical Exam  -- Nursing note and vitals reviewed  -- Constitutional: Appears well-developed and well-nourished  -- Head: Atraumatic. Normocephalic. No obvious abnormality  -- Eyes: Pupils are equal and reactive to light. Normal conjunctiva and lids  -- Cardiac: Normal rate, regular rhythm and normal heart sounds  -- Pulmonary: Normal respiratory effort, breath sounds clear to auscultation  -- Abdominal: Soft, no tenderness. Normal bowel sounds. Normal liver edge  -- Musculoskeletal: Normal range of motion, no effusions. Joints stable   -- Neurological: No focal deficits. Showed good interaction with staff  -- Vascular: Posterior tibial, dorsalis pedis and radial pulses 2+ bilaterally      Emergency Room Course      Lab Results     K 3.8      CO2 24   BUN 19   CREATININE 1.1      ALKPHOS 104   AST 31   ALT 41   BILITOT 0.5   ALBUMIN 4.1   PROT 7.8   WBC 5.91   HGB 15.4   HCT 46.1            CPKMB 1.0   TROPONINI 0.010   BNP <10   MG 2.1   TSH 1.338     Urinalysis  -- Urinalysis performed during this ER visit showed no signs of infection  -- Urine drug screen in the ER today was negative      EKG  -- The EKG findings today were without concerning findings from baseline     Radiology  -- The CT of the head performed in the ER today was negative for acute pathology  -- Chest x-ray showed no infiltrate and showed no acute pathology     Medications Given  meclizine tablet 25 mg (25 mg Oral Given 5/30/20 1358)   ondansetron disintegrating tablet 4 mg (4 mg Oral Given 5/30/20 1359)     ED Physician Management  -- Diagnosis management comments: 54 y.o. male with dizziness today  -- patient has had dizziness since that stroke 3-4 years ago per history  --  patient had completely negative workup today including CT of the head  -- patient's dizziness was dissipated Antivert given in the ER this evening  -- patient counseled to follow up with neurologist regarding this dizziness  -- patient has had several late effects of CVA documented in epic charting  -- patient will prescribe Antivert on discharge, close Neurology follow-up  -- return to the ER with any concerning symptoms after discharge today    Diagnosis  -- The encounter diagnosis was Dizziness.    Disposition and Plan  -- Disposition: home  -- Condition: stable  -- Follow-up: Patient to follow up with Yanci Lowe NP in 1-2 days.  -- I advised the patient that we have found no life threatening condition today  -- At this time, I believe the patient is clinically stable for discharge.   -- The patient acknowledges that close follow up with a MD is required   -- Patient agrees to comply with all instruction and direction given in the ER    This note is dictated on M*Modal word recognition program.  There are word recognition mistakes that are occasionally missed on review.         Philippe Luis MD  05/30/20 6453

## 2020-06-04 ENCOUNTER — OFFICE VISIT (OUTPATIENT)
Dept: INTERNAL MEDICINE | Facility: CLINIC | Age: 55
End: 2020-06-04
Payer: MEDICARE

## 2020-06-04 VITALS
BODY MASS INDEX: 26.08 KG/M2 | DIASTOLIC BLOOD PRESSURE: 82 MMHG | OXYGEN SATURATION: 96 % | WEIGHT: 203.25 LBS | TEMPERATURE: 98 F | HEART RATE: 72 BPM | RESPIRATION RATE: 18 BRPM | SYSTOLIC BLOOD PRESSURE: 118 MMHG | HEIGHT: 74 IN

## 2020-06-04 DIAGNOSIS — L30.9 DERMATITIS: ICD-10-CM

## 2020-06-04 DIAGNOSIS — R55 SYNCOPE, UNSPECIFIED SYNCOPE TYPE: Primary | ICD-10-CM

## 2020-06-04 DIAGNOSIS — Z86.73 HISTORY OF CVA (CEREBROVASCULAR ACCIDENT): ICD-10-CM

## 2020-06-04 PROCEDURE — 99999 PR PBB SHADOW E&M-EST. PATIENT-LVL IV: CPT | Mod: PBBFAC,,, | Performed by: NURSE PRACTITIONER

## 2020-06-04 PROCEDURE — 99214 OFFICE O/P EST MOD 30 MIN: CPT | Mod: PBBFAC,PN | Performed by: NURSE PRACTITIONER

## 2020-06-04 PROCEDURE — 99214 OFFICE O/P EST MOD 30 MIN: CPT | Mod: S$PBB,,, | Performed by: NURSE PRACTITIONER

## 2020-06-04 PROCEDURE — 99999 PR PBB SHADOW E&M-EST. PATIENT-LVL IV: ICD-10-PCS | Mod: PBBFAC,,, | Performed by: NURSE PRACTITIONER

## 2020-06-04 PROCEDURE — 99214 PR OFFICE/OUTPT VISIT, EST, LEVL IV, 30-39 MIN: ICD-10-PCS | Mod: S$PBB,,, | Performed by: NURSE PRACTITIONER

## 2020-06-04 NOTE — PATIENT INSTRUCTIONS
Nonspecific Dermatitis  Dermatitis is a skin rash caused by something that touches the skin and makes it irritated and inflamed.  Your skin may be red, swollen, dry, and may be cracked. Blisters may form and ooze. The rash will itch.  Dermatitis can form on the face and neck, backs of hands, forearms, genitals, and lower legs. Dermatitis is not passed from person to person.  Talk with your health care provider about what may have caused the rash. Common things that cause skin allergies are metal in jewelry, plants like poison ivy or poison oak, and certain skin care products. You will need to avoid the source of your rash in the future to prevent it from coming back. In some cases, the cause of the dermatitis may not be found.  Treatment is done to relieve itching and prevent the rash from coming back. The rash should go away in a few days to a few weeks.  Home care  The health care provider may prescribe medications to relieve swelling and itching. Follow all instructions when using these medications.  · Avoid anything that heats up your skin, such as hot showers or baths, or direct sunlight. This can make itching worse.  · Stay away from whatever you think caused the rash.  · Bathe in warm, not hot, water. Apply a moisturizing lotion after bathing to prevent dry skin.  · Avoid skin irritants such as wool or silk clothing, grease, oils, harsh soaps, and detergents.  · Apply cold compresses to soothe your sores to help relieve your symptoms. Do this for 30 minutes 3 to 4 times a day. You can make a cold compress by soaking a cloth in cold water. Squeeze out excess water. You can add colloidal oatmeal to the water to help reduce itching. For severe itching in a small area, apply an ice pack wrapped in a thin towel. Do this for 20 minutes 3 to 4 times a day.  · You can also help relieve large areas of itching by taking a lukewarm bath with colloidal oatmeal added to the water.  · Use hydrocortisone cream for redness  and irritation, unless another medicine was prescribed. You can also use benzocaine anesthetic cream or spray.  · Use oral diphenhydramine to help reduce itching. This is an antihistamine you can buy at drug and grocery stores. It can make you sleepy, so use lower doses during the daytime. Or you can use loratadine. This is an antihistamine that will not make you sleepy. Dont use diphenhydramine if you have glaucoma or have trouble urinating because of an enlarged prostate.  · Wash your hands or use an antibacterial gel often to prevent the spread of the rash.  Follow-up care  Follow up with your health care provider. Make an appointment with your health care provider if your symptoms do not get better in the next 1 to 2 weeks.  When to seek medical advice  Call your health care provider right away if any of these occur:  · Spreading of the rash to other parts of your body  · Severe swelling of your face, eyelids, mouth, throat or tongue  · Trouble urinating due to swelling in the genital area  · Fever of 100.4°F (38°C) or higher  · Redness or swelling that gets worse  · Pain that gets worse  · Foul-smelling fluid leaking from the skin  · Yellow-brown crusts on the open blisters  · Joint pain   Date Last Reviewed: 7/23/2014  © 6850-7676 [employer]. [address] [place] [address], [place] [address]. All rights reserved. This information is not intended as a substitute for professional medical care. Always follow your healthcare professional's instructions.

## 2020-06-04 NOTE — PROGRESS NOTES
Subjective:       Patient ID: Jett Ballard is a 54 y.o. male.    Chief Complaint: Follow-up (X ER- dizziness)    Patient is known, to me and presents with   Chief Complaint   Patient presents with    Follow-up     X ER- dizziness   .  Denies chest pain and shortness of breath.  Patient presents with ER follow up syncope. Will see neuro and cardiology for further evaluation. No longer with any dizziness today. Still with dermatitis like rash to abdominal regions and arms  HPI  Review of Systems   Constitutional: Negative.  Negative for activity change, appetite change, chills, diaphoresis, fatigue, fever and unexpected weight change.   HENT: Negative.  Negative for congestion, ear discharge, ear pain, facial swelling, hearing loss, nosebleeds, postnasal drip, rhinorrhea, sinus pressure, sneezing, sore throat, tinnitus, trouble swallowing and voice change.    Eyes: Negative.  Negative for photophobia, pain, discharge, redness, itching and visual disturbance.   Respiratory: Negative.  Negative for apnea, cough, choking, chest tightness, shortness of breath, wheezing and stridor.    Cardiovascular: Negative.  Negative for chest pain, palpitations and leg swelling.   Gastrointestinal: Negative for abdominal distention, abdominal pain, anal bleeding, blood in stool, constipation, diarrhea, nausea and vomiting.   Genitourinary: Negative.  Negative for difficulty urinating, discharge, dysuria, enuresis, flank pain, frequency, hematuria, penile pain, penile swelling, scrotal swelling, testicular pain and urgency.   Musculoskeletal: Negative.  Negative for arthralgias, back pain, gait problem, joint swelling, myalgias, neck pain and neck stiffness.   Skin: Positive for rash. Negative for color change, pallor and wound.   Neurological: Positive for dizziness. Negative for tremors, seizures, syncope, facial asymmetry, speech difficulty, weakness, light-headedness, numbness and headaches.   Hematological: Negative for  adenopathy. Does not bruise/bleed easily.   Psychiatric/Behavioral: Negative.  Negative for agitation, dysphoric mood, self-injury, sleep disturbance and suicidal ideas. The patient is not nervous/anxious.        Objective:      Physical Exam   Constitutional: He is oriented to person, place, and time. He appears well-developed and well-nourished. No distress.   HENT:   Head: Normocephalic and atraumatic.   Right Ear: External ear normal.   Left Ear: External ear normal.   Nose: Nose normal.   Mouth/Throat: Oropharynx is clear and moist. No oropharyngeal exudate.   Eyes: Pupils are equal, round, and reactive to light. Conjunctivae and EOM are normal. Right eye exhibits no discharge. Left eye exhibits no discharge.   Neck: Normal range of motion. Neck supple. No JVD present. No tracheal deviation present. No thyromegaly present.   Cardiovascular: Normal rate, regular rhythm, normal heart sounds and intact distal pulses. Exam reveals no gallop and no friction rub.   No murmur heard.  Pulmonary/Chest: Effort normal and breath sounds normal. No stridor. No respiratory distress. He has no wheezes. He has no rales. He exhibits no tenderness.   Abdominal: Soft. Bowel sounds are normal. He exhibits no distension. There is no tenderness. There is no rebound and no guarding.   Musculoskeletal: Normal range of motion. He exhibits no edema or tenderness.   Lymphadenopathy:     He has no cervical adenopathy.   Neurological: He is alert and oriented to person, place, and time. He has normal reflexes. He displays normal reflexes. No cranial nerve deficit. He exhibits normal muscle tone. Coordination normal.   Skin: Skin is warm and dry. Capillary refill takes less than 2 seconds. Rash noted. Rash is maculopapular. He is not diaphoretic. There is erythema. No pallor.        Psychiatric: He has a normal mood and affect. His behavior is normal. Judgment and thought content normal.   Nursing note and vitals reviewed.      Assessment:  "      1. Syncope, unspecified syncope type    2. History of CVA (cerebrovascular accident)    3. Dermatitis        Plan:   Jett was seen today for follow-up.    Diagnoses and all orders for this visit:    Syncope, unspecified syncope type    History of CVA (cerebrovascular accident)    Dermatitis  -     Ambulatory referral/consult to Dermatology; Future    "This note will not be shared with the patient."  Continue with plan of care  rtc as scheduled  "

## 2020-06-18 DIAGNOSIS — I10 BENIGN ESSENTIAL HTN: ICD-10-CM

## 2020-06-18 DIAGNOSIS — R60.0 LOCALIZED EDEMA: ICD-10-CM

## 2020-06-18 RX ORDER — HYDROCHLOROTHIAZIDE 25 MG/1
25 TABLET ORAL DAILY
Qty: 30 TABLET | Refills: 2 | Status: SHIPPED | OUTPATIENT
Start: 2020-06-18 | End: 2020-06-19

## 2020-07-08 DIAGNOSIS — I10 ESSENTIAL HYPERTENSION: ICD-10-CM

## 2020-07-08 RX ORDER — AMLODIPINE AND BENAZEPRIL HYDROCHLORIDE 10; 20 MG/1; MG/1
1 CAPSULE ORAL DAILY
Qty: 30 CAPSULE | Refills: 5 | Status: SHIPPED | OUTPATIENT
Start: 2020-07-08 | End: 2021-07-08

## 2020-07-08 NOTE — TELEPHONE ENCOUNTER
----- Message from Jyoti Howe MA sent at 7/8/2020  1:11 PM CDT -----  Patient states that his rash has come back.  He says he can't afford to go back to dermatology. Would Yanci prescribe the prednisone pack for him again. He says when he took it before the rash had cleared up.    Send to [facility]

## 2020-07-19 ENCOUNTER — HOSPITAL ENCOUNTER (EMERGENCY)
Facility: HOSPITAL | Age: 55
Discharge: HOME | End: 2020-07-19
Attending: SURGERY
Payer: MEDICARE

## 2020-07-19 VITALS
DIASTOLIC BLOOD PRESSURE: 70 MMHG | HEART RATE: 98 BPM | OXYGEN SATURATION: 99 % | BODY MASS INDEX: 26.25 KG/M2 | WEIGHT: 204.5 LBS | RESPIRATION RATE: 18 BRPM | SYSTOLIC BLOOD PRESSURE: 145 MMHG | TEMPERATURE: 98 F

## 2020-07-19 DIAGNOSIS — M79.642 LEFT HAND PAIN: Primary | ICD-10-CM

## 2020-07-19 DIAGNOSIS — L08.9 INFECTED SUPERFICIAL INJURY OF LEFT THUMB, INITIAL ENCOUNTER: ICD-10-CM

## 2020-07-19 DIAGNOSIS — S60.932A INFECTED SUPERFICIAL INJURY OF LEFT THUMB, INITIAL ENCOUNTER: ICD-10-CM
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
## 2020-07-19 PROCEDURE — 25000003 PHARM REV CODE 250: Performed by: SURGERY

## 2020-07-19 PROCEDURE — 99283 EMERGENCY DEPT VISIT LOW MDM: CPT | Mod: 25

## 2020-07-19 PROCEDURE — 29125 APPL SHORT ARM SPLINT STATIC: CPT | Mod: LT

## 2020-07-19 RX ORDER — TRAMADOL HYDROCHLORIDE 50 MG/1
50 TABLET ORAL
Status: COMPLETED | OUTPATIENT
Start: 2020-07-19 | End: 2020-07-19

## 2020-07-19 RX ORDER — TRAMADOL HYDROCHLORIDE 50 MG/1
50 TABLET ORAL EVERY 6 HOURS PRN
Qty: 7 TABLET | Refills: 0 | Status: SHIPPED | OUTPATIENT
Start: 2020-07-19 | End: 2020-07-23

## 2020-07-19 RX ADMIN — TRAMADOL HYDROCHLORIDE 50 MG: 50 TABLET, FILM COATED ORAL at 10:07

## 2020-07-19 NOTE — ED PROVIDER NOTES
[facility]                                                 Chief Complaint  54 y.o. male with Fall and Hand Pain (left)      History of Present Illness  Jett Ballard presents to the emergency room with left thumb pain  Patient states he fell while walking his dog with immediate left thumb pain  Patient denies any head trauma or loss of consciousness with fall/injury  Patient states he has a hard time moving his thumb, no gross deformity  Patient has good distal pulses and capillary refill, normal ROM today    The history is provided by the patient   device was not used during this ER visit  Medical history: HLD, HTN, CVA, tremor, weakness, lightheadedness  Surgeries: Back surgery, colonoscopy and tonsils  Allergies to adhesives and latex    I have reviewed all of this patient's past medical, surgical, family, and social   histories as well as active allergies and medications documented in the  electronic medical record    Review of Systems and Physical Exam      Review of Systems  -- Constitution - no fever, denies fatigue, no weakness, no chills  -- Eyes - no tearing or redness, no visual disturbance  -- Ear, Nose - no tinnitus or earache, no nasal congestion or discharge  -- Mouth,Throat - no sore throat, no toothache, normal voice, normal swallowing  -- Respiratory - denies cough and congestion, no shortness of breath, no GIRALDO  -- Cardiovascular - denies chest pain, no palpitations, denies claudication  -- Gastrointestinal - denies abdominal pain, nausea, vomiting, or diarrhea  -- Genitourinary - no dysuria, denies flank pain, no hematuria, no STD risk  -- Musculoskeletal - left thumb pain after slip and fall  -- Neurological - no headache, denies weakness or seizure; no LOC  -- Skin - denies pallor, rash, or changes in skin. no hives or welts noted    Vital Signs  His temperature is 96.4 °F (35.8 °C).   His blood pressure is 157/79 and his pulse is 99.   His  respiration is 20.     Physical Exam  -- Nursing note and vitals reviewed  -- Constitutional: Appears well-developed and well-nourished  -- Head: Atraumatic. Normocephalic. No obvious abnormality  -- Eyes: Pupils are equal and reactive to light. Normal conjunctiva and lids  -- Cardiac: Normal rate, regular rhythm and normal heart sounds  -- Pulmonary: Normal respiratory effort, breath sounds clear to auscultation  -- Abdominal: Soft, no tenderness. Normal bowel sounds. Normal liver edge  -- Musculoskeletal: Normal range of motion, no effusions. Joints stable   -- Neurological: No focal deficits. Showed good interaction with staff  -- Vascular: Posterior tibial, dorsalis pedis and radial pulses 2+ bilaterally      Emergency Room Course      Treatment and Evaluation  -- ER left hand readings showed no evidence of fracture or dislocation  -- All x-rays are reviewed with a final disposition given by the radiologist  -- Left thumb spica applied by the ER physician  -- PO 50 mg Ultram given in today in the ER    Diagnosis  [W19.XXXA] Fall  [M79.642] Left hand pain (Primary)  [S60.932A, L08.9] Infected superficial injury of left thumb, initial encounter    Disposition and Plan  -- Disposition: home  -- Condition: stable  -- Follow-up: Patient to follow up with Orthopedics in 1-2 days.  -- I advised the patient that we have found no life threatening condition today  -- At this time, I believe the patient is clinically stable for discharge.   -- The patient acknowledges that close follow up with a MD is required   -- Patient agrees to comply with all instruction and direction given in the ER    This note is dictated on M*Modal word recognition program.  There are word recognition mistakes that are occasionally missed on review.         Philippe Luis MD  07/19/20 0008

## 2020-07-30 ENCOUNTER — OFFICE VISIT (OUTPATIENT)
Dept: INTERNAL MEDICINE | Facility: CLINIC | Age: 55
End: 2020-07-30
Payer: MEDICARE

## 2020-07-30 VITALS
OXYGEN SATURATION: 96 % | HEIGHT: 74 IN | DIASTOLIC BLOOD PRESSURE: 76 MMHG | RESPIRATION RATE: 20 BRPM | WEIGHT: 204.38 LBS | HEART RATE: 60 BPM | TEMPERATURE: 98 F | BODY MASS INDEX: 26.23 KG/M2 | SYSTOLIC BLOOD PRESSURE: 116 MMHG

## 2020-07-30 DIAGNOSIS — K21.9 GASTROESOPHAGEAL REFLUX DISEASE WITHOUT ESOPHAGITIS: Primary | ICD-10-CM

## 2020-07-30 PROCEDURE — 99214 PR OFFICE/OUTPT VISIT, EST, LEVL IV, 30-39 MIN: ICD-10-PCS | Mod: S$PBB,,, | Performed by: NURSE PRACTITIONER

## 2020-07-30 PROCEDURE — 99999 PR PBB SHADOW E&M-EST. PATIENT-LVL IV: CPT | Mod: PBBFAC,,, | Performed by: NURSE PRACTITIONER

## 2020-07-30 PROCEDURE — 99999 PR PBB SHADOW E&M-EST. PATIENT-LVL IV: ICD-10-PCS | Mod: PBBFAC,,, | Performed by: NURSE PRACTITIONER

## 2020-07-30 PROCEDURE — 99214 OFFICE O/P EST MOD 30 MIN: CPT | Mod: S$PBB,,, | Performed by: NURSE PRACTITIONER

## 2020-07-30 PROCEDURE — 99214 OFFICE O/P EST MOD 30 MIN: CPT | Mod: PBBFAC,PN | Performed by: NURSE PRACTITIONER

## 2020-07-30 RX ORDER — SUCRALFATE 1 G/1
1 TABLET ORAL
Qty: 28 TABLET | Refills: 0 | Status: SHIPPED | OUTPATIENT
Start: 2020-07-30 | End: 2020-08-02 | Stop reason: ALTCHOICE

## 2020-07-30 RX ORDER — OMEPRAZOLE 40 MG/1
40 CAPSULE, DELAYED RELEASE ORAL EVERY MORNING
Qty: 30 CAPSULE | Refills: 2 | Status: SHIPPED | OUTPATIENT
Start: 2020-07-30 | End: 2020-10-22 | Stop reason: SDUPTHER

## 2020-07-30 NOTE — PATIENT INSTRUCTIONS
Medicines for Acid Reflux  Your healthcare provider has told you that you have acid reflux. This condition causes stomach acid to wash up into your throat. For most people, acid reflux is troubling but not dangerous. But left untreated, acid reflux sometimes damages the esophagus. Medicines can help control acid reflux and limit your risk of future problems.  Medicines for acid reflux  Your healthcare provider may prescribe medicine to help treat your acid reflux. Medicine will be based on your symptoms and any test results. Your provider will explain how to take your medicine. You will also be told about possible side effects.  Reducing stomach acid  Your provider may suggest antacids that you can buy over the counter. Antacids can give fast relief. Or you may be told to take a type of medicine called H2 blockers. These are available over the counter and by prescription (for higher doses).  Blocking stomach acid  In more severe cases, your healthcare provider may suggest stronger medicines such as proton pump inhibitors (PPIs). These keep the stomach from making acid. They are often prescribed for long-term use.  Other medicines  In some cases medicines to reduce or block stomach acid may not work. Then you may be switched to another type of medicine that helps your stomach empty better.     Date Last Reviewed: 10/1/2016  © 5705-5904 [employer]. [address] [place] [address], [place] [address]. All rights reserved. This information is not intended as a substitute for professional medical care. Always follow your healthcare professional's instructions.

## 2020-07-30 NOTE — PROGRESS NOTES
Subjective:       Patient ID: Jett Ballard is a 54 y.o. male.    Chief Complaint: Aphasia and Nausea    Patient is known, to me and presents with   Chief Complaint   Patient presents with    Aphasia    Nausea   .  Denies chest pain and shortness of breath.  Patient presents with gerd exacerbation for the past week. Has been on pantoprazole for many years. Eats fried foods   HPI  Review of Systems   Constitutional: Negative.  Negative for activity change, appetite change, chills, diaphoresis, fatigue, fever and unexpected weight change.   HENT: Negative.  Negative for congestion, ear discharge, ear pain, facial swelling, hearing loss, nosebleeds, postnasal drip, rhinorrhea, sinus pressure, sneezing, sore throat, tinnitus, trouble swallowing and voice change.    Eyes: Negative.  Negative for photophobia, pain, discharge, redness, itching and visual disturbance.   Respiratory: Negative.  Negative for apnea, cough, choking, chest tightness, shortness of breath, wheezing and stridor.    Cardiovascular: Negative.  Negative for chest pain, palpitations and leg swelling.   Gastrointestinal: Positive for abdominal pain, nausea and vomiting. Negative for abdominal distention, anal bleeding, blood in stool, constipation, diarrhea and rectal pain.   Genitourinary: Negative.  Negative for difficulty urinating, discharge, dysuria, enuresis, flank pain, frequency, hematuria, penile pain, penile swelling, scrotal swelling, testicular pain and urgency.   Musculoskeletal: Negative.  Negative for arthralgias, back pain, gait problem, joint swelling, myalgias, neck pain and neck stiffness.   Skin: Negative.  Negative for color change, pallor, rash and wound.   Neurological: Negative for dizziness, tremors, seizures, syncope, facial asymmetry, speech difficulty, weakness, light-headedness, numbness and headaches.   Hematological: Negative for adenopathy. Does not bruise/bleed easily.   Psychiatric/Behavioral: Negative.  Negative  for agitation, dysphoric mood, sleep disturbance and suicidal ideas. The patient is not nervous/anxious.        Objective:      Physical Exam  Vitals signs and nursing note reviewed.   Constitutional:       General: He is not in acute distress.     Appearance: He is well-developed. He is not diaphoretic.   HENT:      Head: Normocephalic and atraumatic.      Right Ear: External ear normal.      Left Ear: External ear normal.      Nose: Nose normal.      Mouth/Throat:      Pharynx: No oropharyngeal exudate.   Eyes:      General:         Right eye: No discharge.         Left eye: No discharge.      Conjunctiva/sclera: Conjunctivae normal.      Pupils: Pupils are equal, round, and reactive to light.   Neck:      Musculoskeletal: Normal range of motion and neck supple.      Thyroid: No thyromegaly.      Vascular: No JVD.      Trachea: No tracheal deviation.   Cardiovascular:      Rate and Rhythm: Normal rate and regular rhythm.      Heart sounds: Normal heart sounds. No murmur. No friction rub. No gallop.    Pulmonary:      Effort: Pulmonary effort is normal. No respiratory distress.      Breath sounds: Normal breath sounds. No stridor. No wheezing or rales.   Chest:      Chest wall: No tenderness.   Abdominal:      General: Bowel sounds are normal. There is no distension.      Palpations: Abdomen is soft. There is no mass.      Tenderness: There is no abdominal tenderness. There is no right CVA tenderness, left CVA tenderness, guarding or rebound.      Hernia: No hernia is present.   Musculoskeletal: Normal range of motion.         General: No tenderness.   Lymphadenopathy:      Cervical: No cervical adenopathy.   Skin:     General: Skin is warm and dry.      Coloration: Skin is not pale.      Findings: No erythema or rash.   Neurological:      Mental Status: He is alert and oriented to person, place, and time.      Cranial Nerves: No cranial nerve deficit.      Motor: No abnormal muscle tone.      Coordination:  "Coordination normal.      Deep Tendon Reflexes: Reflexes are normal and symmetric. Reflexes normal.   Psychiatric:         Behavior: Behavior normal.         Thought Content: Thought content normal.         Judgment: Judgment normal.         Assessment:       1. Gastroesophageal reflux disease without esophagitis        Plan:   Jett was seen today for aphasia and nausea.    Diagnoses and all orders for this visit:    Gastroesophageal reflux disease without esophagitis  -     sucralfate (CARAFATE) 1 gram tablet; Take 1 tablet (1 g total) by mouth 4 (four) times daily before meals and nightly. for 7 days  -     omeprazole (PRILOSEC) 40 MG capsule; Take 1 capsule (40 mg total) by mouth every morning.  -     EKG 12-lead; Future    "This note will not be shared with the patient."  ekg with no changes from previous ekg  Will switch to prilosec and place on carafate for one week  If any worsening go to ER  RTC as scheduled    "

## 2020-08-02 ENCOUNTER — HOSPITAL ENCOUNTER (EMERGENCY)
Facility: HOSPITAL | Age: 55
Discharge: HOME | End: 2020-08-02
Attending: SURGERY
Payer: MEDICARE

## 2020-08-02 VITALS
HEART RATE: 74 BPM | RESPIRATION RATE: 16 BRPM | OXYGEN SATURATION: 98 % | SYSTOLIC BLOOD PRESSURE: 127 MMHG | DIASTOLIC BLOOD PRESSURE: 75 MMHG

## 2020-08-02 DIAGNOSIS — K21.9 GASTROESOPHAGEAL REFLUX DISEASE, ESOPHAGITIS PRESENCE NOT SPECIFIED: Primary | ICD-10-CM

## 2020-08-02 DIAGNOSIS — F41.9 ANXIETY: ICD-10-CM

## 2020-08-02 LAB
ALBUMIN SERPL BCP-MCNC: 4.6 G/DL (ref 3.5–5.2)
ALP SERPL-CCNC: 93 U/L (ref 55–135)
ALT SERPL W/O P-5'-P-CCNC: 48 U/L (ref 10–44)
AMPHET+METHAMPHET UR QL: NEGATIVE
ANION GAP SERPL CALC-SCNC: 15 MMOL/L (ref 8–16)
APTT BLDCRRT: 27.8 SEC (ref 21–32)
AST SERPL-CCNC: 44 U/L (ref 10–40)
BARBITURATES UR QL SCN>200 NG/ML: NEGATIVE
BASOPHILS # BLD AUTO: 0.04 K/UL (ref 0–0.2)
BASOPHILS NFR BLD: 0.7 % (ref 0–1.9)
BENZODIAZ UR QL SCN>200 NG/ML: NEGATIVE
BILIRUB SERPL-MCNC: 0.8 MG/DL (ref 0.1–1)
BILIRUB UR QL STRIP: NEGATIVE
BNP SERPL-MCNC: <10 PG/ML (ref 0–99)
BUN SERPL-MCNC: 20 MG/DL (ref 6–20)
BZE UR QL SCN: NEGATIVE
CALCIUM SERPL-MCNC: 9.9 MG/DL (ref 8.7–10.5)
CANNABINOIDS UR QL SCN: NEGATIVE
CHLORIDE SERPL-SCNC: 102 MMOL/L (ref 95–110)
CK MB SERPL-MCNC: 2.4 NG/ML (ref 0.1–6.5)
CK MB SERPL-MCNC: 3.8 NG/ML (ref 0.1–6.5)
CK MB SERPL-RTO: 0.3 % (ref 0–5)
CK MB SERPL-RTO: 0.4 % (ref 0–5)
CK SERPL-CCNC: 698 U/L (ref 20–200)
CK SERPL-CCNC: 698 U/L (ref 20–200)
CK SERPL-CCNC: 904 U/L (ref 20–200)
CK SERPL-CCNC: 904 U/L (ref 20–200)
CLARITY UR: CLEAR
CO2 SERPL-SCNC: 22 MMOL/L (ref 23–29)
COLOR UR: YELLOW
CREAT SERPL-MCNC: 1.1 MG/DL (ref 0.5–1.4)
CREAT UR-MCNC: 115.4 MG/DL (ref 23–375)
D DIMER PPP IA.FEU-MCNC: 0.31 MG/L FEU
DIFFERENTIAL METHOD: ABNORMAL
EOSINOPHIL # BLD AUTO: 0.1 K/UL (ref 0–0.5)
EOSINOPHIL NFR BLD: 1.9 % (ref 0–8)
ERYTHROCYTE [DISTWIDTH] IN BLOOD BY AUTOMATED COUNT: 12.1 % (ref 11.5–14.5)
EST. GFR  (AFRICAN AMERICAN): >60 ML/MIN/1.73 M^2
EST. GFR  (NON AFRICAN AMERICAN): >60 ML/MIN/1.73 M^2
GLUCOSE SERPL-MCNC: 96 MG/DL (ref 70–110)
GLUCOSE UR QL STRIP: NEGATIVE
HCT VFR BLD AUTO: 47.3 % (ref 40–54)
HGB BLD-MCNC: 16 G/DL (ref 14–18)
HGB UR QL STRIP: NEGATIVE
IMM GRANULOCYTES # BLD AUTO: 0.02 K/UL (ref 0–0.04)
IMM GRANULOCYTES NFR BLD AUTO: 0.4 % (ref 0–0.5)
INR PPP: 1 (ref 0.8–1.2)
KETONES UR QL STRIP: NEGATIVE
LEUKOCYTE ESTERASE UR QL STRIP: NEGATIVE
LYMPHOCYTES # BLD AUTO: 1 K/UL (ref 1–4.8)
LYMPHOCYTES NFR BLD: 17.2 % (ref 18–48)
MAGNESIUM SERPL-MCNC: 2.5 MG/DL (ref 1.6–2.6)
MCH RBC QN AUTO: 30.4 PG (ref 27–31)
MCHC RBC AUTO-ENTMCNC: 33.8 G/DL (ref 32–36)
MCV RBC AUTO: 90 FL (ref 82–98)
METHADONE UR QL SCN>300 NG/ML: NEGATIVE
MONOCYTES # BLD AUTO: 0.4 K/UL (ref 0.3–1)
MONOCYTES NFR BLD: 7 % (ref 4–15)
NEUTROPHILS # BLD AUTO: 4.2 K/UL (ref 1.8–7.7)
NEUTROPHILS NFR BLD: 72.8 % (ref 38–73)
NITRITE UR QL STRIP: NEGATIVE
NRBC BLD-RTO: 0 /100 WBC
OPIATES UR QL SCN: NEGATIVE
PCP UR QL SCN>25 NG/ML: NEGATIVE
PH UR STRIP: 6 [PH] (ref 5–8)
PHOSPHATE SERPL-MCNC: 2.4 MG/DL (ref 2.7–4.5)
PLATELET # BLD AUTO: 258 K/UL (ref 150–350)
PMV BLD AUTO: 9.4 FL (ref 9.2–12.9)
POTASSIUM SERPL-SCNC: 4.1 MMOL/L (ref 3.5–5.1)
PROT SERPL-MCNC: 8.5 G/DL (ref 6–8.4)
PROT UR QL STRIP: NEGATIVE
PROTHROMBIN TIME: 10.7 SEC (ref 9–12.5)
RBC # BLD AUTO: 5.26 M/UL (ref 4.6–6.2)
SODIUM SERPL-SCNC: 139 MMOL/L (ref 136–145)
SP GR UR STRIP: 1.01 (ref 1–1.03)
TOXICOLOGY INFORMATION: NORMAL
TROPONIN I SERPL DL<=0.01 NG/ML-MCNC: 0.01 NG/ML (ref 0–0.03)
TROPONIN I SERPL DL<=0.01 NG/ML-MCNC: 0.01 NG/ML (ref 0–0.03)
TSH SERPL DL<=0.005 MIU/L-ACNC: 0.95 UIU/ML (ref 0.4–4)
URN SPEC COLLECT METH UR: NORMAL
UROBILINOGEN UR STRIP-ACNC: NEGATIVE EU/DL
WBC # BLD AUTO: 5.7 K/UL (ref 3.9–12.7)

## 2020-08-02 PROCEDURE — 93010 ELECTROCARDIOGRAM REPORT: CPT | Mod: ,,, | Performed by: INTERNAL MEDICINE

## 2020-08-02 PROCEDURE — 82550 ASSAY OF CK (CPK): CPT

## 2020-08-02 PROCEDURE — 85379 FIBRIN DEGRADATION QUANT: CPT

## 2020-08-02 PROCEDURE — 85610 PROTHROMBIN TIME: CPT

## 2020-08-02 PROCEDURE — 80307 DRUG TEST PRSMV CHEM ANLYZR: CPT

## 2020-08-02 PROCEDURE — 80053 COMPREHEN METABOLIC PANEL: CPT

## 2020-08-02 PROCEDURE — 93005 ELECTROCARDIOGRAM TRACING: CPT

## 2020-08-02 PROCEDURE — 84100 ASSAY OF PHOSPHORUS: CPT

## 2020-08-02 PROCEDURE — 83880 ASSAY OF NATRIURETIC PEPTIDE: CPT

## 2020-08-02 PROCEDURE — 36415 COLL VENOUS BLD VENIPUNCTURE: CPT

## 2020-08-02 PROCEDURE — 82553 CREATINE MB FRACTION: CPT

## 2020-08-02 PROCEDURE — 84484 ASSAY OF TROPONIN QUANT: CPT

## 2020-08-02 PROCEDURE — 99285 EMERGENCY DEPT VISIT HI MDM: CPT | Mod: 25

## 2020-08-02 PROCEDURE — 25000003 PHARM REV CODE 250: Performed by: SURGERY

## 2020-08-02 PROCEDURE — 85025 COMPLETE CBC W/AUTO DIFF WBC: CPT

## 2020-08-02 PROCEDURE — 83735 ASSAY OF MAGNESIUM: CPT

## 2020-08-02 PROCEDURE — 93010 EKG 12-LEAD: ICD-10-PCS | Mod: ,,, | Performed by: INTERNAL MEDICINE

## 2020-08-02 PROCEDURE — 85730 THROMBOPLASTIN TIME PARTIAL: CPT

## 2020-08-02 PROCEDURE — 81003 URINALYSIS AUTO W/O SCOPE: CPT

## 2020-08-02 PROCEDURE — 84443 ASSAY THYROID STIM HORMONE: CPT

## 2020-08-02 RX ORDER — PANTOPRAZOLE SODIUM 40 MG/1
40 TABLET, DELAYED RELEASE ORAL DAILY
Qty: 30 TABLET | Refills: 0 | Status: SHIPPED | OUTPATIENT
Start: 2020-08-02 | End: 2023-09-27

## 2020-08-02 RX ORDER — ASPIRIN 325 MG
325 TABLET ORAL
Status: COMPLETED | OUTPATIENT
Start: 2020-08-02 | End: 2020-08-02

## 2020-08-02 RX ORDER — SUCRALFATE 1 G/1
1 TABLET ORAL 4 TIMES DAILY
Qty: 120 TABLET | Refills: 0 | Status: SHIPPED | OUTPATIENT
Start: 2020-08-02 | End: 2020-09-01

## 2020-08-02 RX ADMIN — ASPIRIN 325 MG ORAL TABLET 325 MG: 325 PILL ORAL at 12:08

## 2020-08-02 RX ADMIN — DICYCLOMINE HYDROCHLORIDE 50 ML: 10 SOLUTION ORAL at 12:08

## 2020-08-02 NOTE — ED PROVIDER NOTES
[facility]                                                 Chief Complaint  54 y.o. male with Chest Pain      History of Present Illness  Jett Ballard presents to the emergency room with belching today  Patient with significant belching, patient with history of GERD reported   Patient states he has complaint is nurse practitioner indigestion every night  Patient on exam has normal lung sounds and clear cardiac evaluation today  Patient states GI cocktail given the ER completely dissipated symptoms now  Normal sinus rhythm on EKG with no ST changes noted today in the ER  Patient is medical history negative for coronary artery disease or past MI    The history is provided by the patient   device was not used during this ER visit  Medical history: HLD, HTN, CVA, tremor, weakness, lightheadedness  Surgeries: Back surgery, colonoscopy and tonsils  Allergies to adhesives and latex    I have reviewed all of this patient's past medical, surgical, family, and social   histories as well as active allergies and medications documented in the  electronic medical record    Review of Systems and Physical Exam      Review of Systems  -- Constitution - no fever, denies fatigue, no weakness, no chills  -- Eyes - no tearing or redness, no visual disturbance  -- Ear, Nose - no tinnitus or earache, no nasal congestion or discharge  -- Mouth,Throat - no sore throat, no toothache, normal voice, normal swallowing  -- Respiratory - denies cough and congestion, no shortness of breath, no GIRALDO  -- Cardiovascular - chest pain, no palpitations, denies claudication  -- Gastrointestinal - GERD, denies abdominal pain, nausea, vomiting  -- Genitourinary - no dysuria, denies flank pain, no hematuria, no STD risk  -- Musculoskeletal - denies back pain, negative for trauma or injury  -- Neurological - no headache, denies weakness or seizure; no LOC  -- Skin - denies pallor, rash, or changes in skin. no hives  or welts noted  -- Psychiatric - Denies SI or HI, no psychosis or fractured thought noted     Physical Exam  -- Nursing note and vitals reviewed  -- Constitutional: Appears well-developed and well-nourished  -- Head: Atraumatic. Normocephalic. No obvious abnormality  -- Eyes: Pupils are equal and reactive to light. Normal conjunctiva and lids  -- Nose: Nose normal in appearance, nares grossly normal. No discharge  -- Throat: Mucous membranes moist, pharynx normal, normal tonsils. No lesions   -- Ears: External ears and TM normal bilaterally. Normal hearing and no drainage  -- Neck: Normal range of motion. Neck supple. No masses, trachea midline  -- Cardiac: Normal rate, regular rhythm and normal heart sounds  -- Pulmonary: Normal respiratory effort, breath sounds clear to auscultation  -- Abdominal: Soft, no tenderness. Normal bowel sounds. Normal liver edge  -- Musculoskeletal: Normal range of motion, no effusions. Joints stable   -- Neurological: No focal deficits. Showed good interaction with staff  -- Vascular: Posterior tibial, dorsalis pedis and radial pulses 2+ bilaterally      Emergency Room Course      Lab Results     K 4.1      CO2 22 (L)   BUN 20   CREATININE 1.1   GLU 96   ALKPHOS 93   AST 44 (H)   ALT 48 (H)   BILITOT 0.8   ALBUMIN 4.6   PROT 8.5 (H)   WBC 5.70   HGB 16.0   HCT 47.3       (H)    (H)   CPKMB 3.8   TROPONINI 0.012   INR 1.0   BNP <10   DDIMER 0.31   MG 2.5   TSH 0.948     Urinalysis  -- Urinalysis performed during this ER visit showed no signs of infection      EKG  -- The EKG findings today were without concerning findings from baseline  -- The troponin drawn in the ER today was within normal limits  -- The 2nd troponin drawn in the ER today was within normal limits      Radiology  -- Chest x-ray showed no infiltrate and showed no acute pathology     Medications Given  aspirin tablet 325 mg (325 mg Oral Given 8/2/20 5966)   GI cocktail (mylanta 30 mL,  lidocaine 2 % viscous 10 mL, dicyclomine 10 mL) 50 mL      ED Physician Management  -- Diagnosis management comments: 54 y.o. male with indigestion today  -- patient with belching and GERD symptoms today, chest pain at home  -- negative cardiac workup including 2 sets of troponins, normal EKG  -- negative D-dimer, GI cocktail given the ER dissipated all symptoms  -- patient has had this issue before discussed with NP in clinic before  -- patient be placed on Carafate and Protonix with GERD diet on discharge  -- patient given information for local GI doctor for outpatient EGD/evaluation  -- return to the ER with any concerning symptoms after discharge today    Diagnosis  [F41.9] Anxiety  [K21.9] Gastroesophageal reflux disease    Disposition and Plan  -- Disposition: home  -- Condition: stable  -- Follow-up: Patient to follow up with Yanci Lowe NP in 1-2 days.  -- I advised the patient that we have found no life threatening condition today  -- At this time, I believe the patient is clinically stable for discharge.   -- The patient acknowledges that close follow up with a MD is required   -- Patient agrees to comply with all instruction and direction given in the ER    This note is dictated on [employer]*Modal word recognition program.  There are word recognition mistakes that are occasionally missed on review.         Philippe Luis MD  08/02/20 6822

## 2020-08-05 ENCOUNTER — OFFICE VISIT (OUTPATIENT)
Dept: INTERNAL MEDICINE | Facility: CLINIC | Age: 55
End: 2020-08-05
Payer: MEDICARE

## 2020-08-05 VITALS
DIASTOLIC BLOOD PRESSURE: 84 MMHG | SYSTOLIC BLOOD PRESSURE: 116 MMHG | WEIGHT: 208.75 LBS | HEIGHT: 74 IN | OXYGEN SATURATION: 97 % | RESPIRATION RATE: 18 BRPM | HEART RATE: 82 BPM | BODY MASS INDEX: 26.79 KG/M2 | TEMPERATURE: 99 F

## 2020-08-05 DIAGNOSIS — R60.0 PEDAL EDEMA: Primary | ICD-10-CM

## 2020-08-05 PROCEDURE — 99214 OFFICE O/P EST MOD 30 MIN: CPT | Mod: PBBFAC,PN | Performed by: NURSE PRACTITIONER

## 2020-08-05 PROCEDURE — 99999 PR PBB SHADOW E&M-EST. PATIENT-LVL IV: ICD-10-PCS | Mod: PBBFAC,,, | Performed by: NURSE PRACTITIONER

## 2020-08-05 PROCEDURE — 99213 OFFICE O/P EST LOW 20 MIN: CPT | Mod: S$PBB,,, | Performed by: NURSE PRACTITIONER

## 2020-08-05 PROCEDURE — 99213 PR OFFICE/OUTPT VISIT, EST, LEVL III, 20-29 MIN: ICD-10-PCS | Mod: S$PBB,,, | Performed by: NURSE PRACTITIONER

## 2020-08-05 PROCEDURE — 99999 PR PBB SHADOW E&M-EST. PATIENT-LVL IV: CPT | Mod: PBBFAC,,, | Performed by: NURSE PRACTITIONER

## 2020-08-05 RX ORDER — TRIAMTERENE AND HYDROCHLOROTHIAZIDE 37.5; 25 MG/1; MG/1
1 CAPSULE ORAL
Qty: 12 CAPSULE | Refills: 0 | Status: SHIPPED | OUTPATIENT
Start: 2020-08-05 | End: 2023-09-27

## 2020-08-05 NOTE — PROGRESS NOTES
Subjective:       Patient ID: Jett Ballard is a 54 y.o. male.    Chief Complaint: Foot Swelling (x 2 days)    New to me but seen previously in clinic by a fellow provider. Pt presents with recurrent bilateral pedal edema. He was seen in ER 3 days ago for recurrent chest pain, found to be non cardiac though he has appointment scheduled with  for next week to f/u. States began after leaving the hospital, worse after walking and at the end of the day then improves with elevation and rest. Denies any associated symptoms at this time.     Edema  This is a recurrent problem. The current episode started in the past 7 days. The problem has been waxing and waning. Associated symptoms include joint swelling (bilateral feet and ankles) and myalgias (bilateral lower legs). Pertinent negatives include no abdominal pain, anorexia, arthralgias, change in bowel habit, chest pain, chills, congestion, coughing, diaphoresis, fatigue, fever, headaches, nausea, neck pain, numbness, rash, sore throat, swollen glands, urinary symptoms, vertigo, visual change, vomiting or weakness. The symptoms are aggravated by walking and standing. He has tried rest (elevation) for the symptoms. The treatment provided moderate relief.     Review of Systems   Constitutional: Negative for activity change, appetite change, chills, diaphoresis, fatigue, fever and unexpected weight change.   HENT: Negative for nasal congestion, ear pain, postnasal drip, rhinorrhea, sneezing, sore throat and voice change.    Eyes: Negative for pain and visual disturbance.   Respiratory: Negative for cough, chest tightness and shortness of breath.    Cardiovascular: Positive for leg swelling. Negative for chest pain, palpitations and claudication.   Gastrointestinal: Negative for abdominal pain, anorexia, change in bowel habit, constipation, diarrhea, nausea, vomiting and change in bowel habit.   Endocrine: Negative.    Genitourinary: Negative for difficulty  urinating.   Musculoskeletal: Positive for back pain (chronic, unchanged), joint swelling (bilateral feet and ankles) and myalgias (bilateral lower legs). Negative for arthralgias, gait problem, leg pain, neck pain, neck stiffness and joint deformity.   Integumentary:  Negative for rash.   Allergic/Immunologic: Negative.    Neurological: Negative for vertigo, speech difficulty, weakness, numbness and headaches.   Hematological: Negative.    Psychiatric/Behavioral: Negative.    All other systems reviewed and are negative.        Objective:      Physical Exam  Vitals signs and nursing note reviewed.   Constitutional:       General: He is not in acute distress.     Appearance: Normal appearance. He is well-developed, well-groomed and normal weight.   HENT:      Head: Normocephalic and atraumatic.      Right Ear: External ear normal.      Left Ear: External ear normal.      Nose: Nose normal.      Mouth/Throat:      Lips: Pink.      Mouth: Mucous membranes are moist.   Eyes:      General: Lids are normal.      Conjunctiva/sclera: Conjunctivae normal.      Pupils: Pupils are equal, round, and reactive to light.   Neck:      Musculoskeletal: Neck supple.      Trachea: Phonation normal.   Cardiovascular:      Rate and Rhythm: Normal rate and regular rhythm.      Pulses: Normal pulses.           Dorsalis pedis pulses are 2+ on the right side and 2+ on the left side.        Posterior tibial pulses are 2+ on the right side and 2+ on the left side.      Heart sounds: Normal heart sounds.   Pulmonary:      Effort: Pulmonary effort is normal. No respiratory distress.      Breath sounds: Normal breath sounds and air entry. No wheezing or rales.   Abdominal:      General: Bowel sounds are normal.      Palpations: Abdomen is soft.      Tenderness: There is no abdominal tenderness.   Musculoskeletal:      Right lower le+ Edema present.      Left lower le+ Edema present.   Feet:      Right foot:      Skin integrity: Skin  integrity normal.      Left foot:      Skin integrity: Skin integrity normal.   Skin:     General: Skin is warm and dry.      Findings: No rash.   Neurological:      General: No focal deficit present.      Mental Status: He is alert and oriented to person, place, and time. Mental status is at baseline.      Cranial Nerves: Cranial nerves are intact.      Sensory: Sensation is intact.      Motor: Motor function is intact.      Coordination: Coordination is intact.      Gait: Gait is intact.   Psychiatric:         Attention and Perception: Attention and perception normal.         Mood and Affect: Mood and affect normal.         Speech: Speech normal.         Behavior: Behavior normal. Behavior is cooperative.         Thought Content: Thought content normal.         Cognition and Memory: Cognition and memory normal.         Judgment: Judgment normal.         Assessment:       1. Pedal edema        Plan:       1. Pedal edema  ED notes and labs reviewed  Keep scheduled appointment with   Recommendations: decrease sodium in the diet, elevate feet above the level of the heart whenever possible, increase physical activity, use of compression stockings and weight loss.  The patient was also instructed to call IMMEDIATELY (i.e., day or night) if any cardiopulmonary symptoms occur, especially chest pain, shortness of breath, dyspnea on exertion, paroxysmal nocturnal dyspnea, or orthopnea, and these were explained.  - triamterene-hydrochlorothiazide 37.5-25 mg (DYAZIDE) 37.5-25 mg per capsule; Take 1 capsule by mouth every Mon, Wed, Fri.  Dispense: 12 capsule; Refill: 0           FRANCESCA Allen, FNP-C  Family/Internal Medicine  [facility]

## 2020-09-08 DIAGNOSIS — E78.2 MIXED HYPERLIPIDEMIA: ICD-10-CM

## 2020-09-08 RX ORDER — ROSUVASTATIN CALCIUM 20 MG/1
TABLET, COATED ORAL
Qty: 30 TABLET | Refills: 5 | Status: SHIPPED | OUTPATIENT
Start: 2020-09-08 | End: 2021-03-25 | Stop reason: SDUPTHER

## 2020-10-22 DIAGNOSIS — K21.9 GASTROESOPHAGEAL REFLUX DISEASE WITHOUT ESOPHAGITIS: ICD-10-CM

## 2020-10-22 RX ORDER — OMEPRAZOLE 40 MG/1
40 CAPSULE, DELAYED RELEASE ORAL EVERY MORNING
Qty: 30 CAPSULE | Refills: 2 | Status: SHIPPED | OUTPATIENT
Start: 2020-10-22 | End: 2021-02-21 | Stop reason: SDUPTHER

## 2021-02-21 DIAGNOSIS — K21.9 GASTROESOPHAGEAL REFLUX DISEASE WITHOUT ESOPHAGITIS: ICD-10-CM

## 2021-02-22 RX ORDER — OMEPRAZOLE 40 MG/1
40 CAPSULE, DELAYED RELEASE ORAL EVERY MORNING
Qty: 30 CAPSULE | Refills: 2 | Status: SHIPPED | OUTPATIENT
Start: 2021-02-22 | End: 2021-05-26 | Stop reason: SDUPTHER

## 2021-03-25 DIAGNOSIS — E78.2 MIXED HYPERLIPIDEMIA: ICD-10-CM

## 2021-03-25 RX ORDER — ROSUVASTATIN CALCIUM 20 MG/1
TABLET, COATED ORAL
Qty: 30 TABLET | Refills: 5 | Status: SHIPPED | OUTPATIENT
Start: 2021-03-25 | End: 2021-08-25 | Stop reason: SDUPTHER

## 2021-05-26 DIAGNOSIS — K21.9 GASTROESOPHAGEAL REFLUX DISEASE WITHOUT ESOPHAGITIS: ICD-10-CM

## 2021-05-27 RX ORDER — OMEPRAZOLE 40 MG/1
40 CAPSULE, DELAYED RELEASE ORAL EVERY MORNING
Qty: 30 CAPSULE | Refills: 2 | Status: SHIPPED | OUTPATIENT
Start: 2021-05-27 | End: 2023-09-27

## 2021-07-30 ENCOUNTER — HOSPITAL ENCOUNTER (EMERGENCY)
Facility: HOSPITAL | Age: 56
Discharge: HOME | End: 2021-07-30
Attending: SURGERY
Payer: COMMERCIAL

## 2021-07-30 VITALS
HEART RATE: 83 BPM | SYSTOLIC BLOOD PRESSURE: 164 MMHG | BODY MASS INDEX: 25.94 KG/M2 | TEMPERATURE: 97 F | WEIGHT: 202.13 LBS | DIASTOLIC BLOOD PRESSURE: 95 MMHG | RESPIRATION RATE: 18 BRPM | HEIGHT: 74 IN | OXYGEN SATURATION: 97 %

## 2021-07-30 DIAGNOSIS — J00 ACUTE NASOPHARYNGITIS: Primary | ICD-10-CM

## 2021-07-30 LAB
SARS-COV-2 RNA RESP QL NAA+PROBE: NOT DETECTED
SARS-COV-2- CYCLE NUMBER: -1

## 2021-07-30 PROCEDURE — U0005 INFEC AGEN DETEC AMPLI PROBE: HCPCS | Performed by: SURGERY

## 2021-07-30 PROCEDURE — 99284 EMERGENCY DEPT VISIT MOD MDM: CPT

## 2021-07-30 PROCEDURE — U0003 INFECTIOUS AGENT DETECTION BY NUCLEIC ACID (DNA OR RNA); SEVERE ACUTE RESPIRATORY SYNDROME CORONAVIRUS 2 (SARS-COV-2) (CORONAVIRUS DISEASE [COVID-19]), AMPLIFIED PROBE TECHNIQUE, MAKING USE OF HIGH THROUGHPUT TECHNOLOGIES AS DESCRIBED BY CMS-2020-01-R: HCPCS | Performed by: SURGERY

## 2021-07-30 RX ORDER — AZITHROMYCIN 250 MG/1
TABLET, FILM COATED ORAL
Qty: 6 TABLET | Refills: 0 | Status: SHIPPED | OUTPATIENT
Start: 2021-07-30 | End: 2023-09-27

## 2021-07-30 RX ORDER — CETIRIZINE HYDROCHLORIDE 10 MG/1
10 TABLET ORAL DAILY
Qty: 30 TABLET | Refills: 0 | Status: SHIPPED | OUTPATIENT
Start: 2021-07-30 | End: 2023-09-27

## 2021-07-30 RX ORDER — BENZONATATE 100 MG/1
200 CAPSULE ORAL 3 TIMES DAILY PRN
Qty: 20 CAPSULE | Refills: 0 | Status: SHIPPED | OUTPATIENT
Start: 2021-07-30 | End: 2021-08-09

## 2021-07-30 RX ORDER — METHYLPREDNISOLONE 4 MG/1
TABLET ORAL
Qty: 1 PACKAGE | Refills: 0 | Status: SHIPPED | OUTPATIENT
Start: 2021-07-30 | End: 2023-09-27

## 2021-08-16 ENCOUNTER — TELEPHONE (OUTPATIENT)
Dept: INTERNAL MEDICINE | Facility: CLINIC | Age: 56
End: 2021-08-16

## 2021-08-16 RX ORDER — AMLODIPINE AND BENAZEPRIL HYDROCHLORIDE 5; 20 MG/1; MG/1
CAPSULE ORAL
Qty: 30 CAPSULE | Refills: 11 | Status: SHIPPED | OUTPATIENT
Start: 2021-08-16 | End: 2023-09-27 | Stop reason: SDUPTHER

## 2021-08-25 DIAGNOSIS — E78.2 MIXED HYPERLIPIDEMIA: ICD-10-CM

## 2021-08-26 RX ORDER — ROSUVASTATIN CALCIUM 20 MG/1
TABLET, COATED ORAL
Qty: 30 TABLET | Refills: 5 | Status: SHIPPED | OUTPATIENT
Start: 2021-08-26 | End: 2023-09-27 | Stop reason: SDUPTHER

## 2021-08-31 DIAGNOSIS — K21.9 GASTROESOPHAGEAL REFLUX DISEASE WITHOUT ESOPHAGITIS: ICD-10-CM

## 2021-09-07 RX ORDER — OMEPRAZOLE 40 MG/1
40 CAPSULE, DELAYED RELEASE ORAL EVERY MORNING
Qty: 30 CAPSULE | Refills: 2 | OUTPATIENT
Start: 2021-09-07 | End: 2021-10-07

## 2021-12-20 ENCOUNTER — HOSPITAL ENCOUNTER (EMERGENCY)
Facility: HOSPITAL | Age: 56
Discharge: HOME | End: 2021-12-20
Attending: EMERGENCY MEDICINE

## 2021-12-20 VITALS
OXYGEN SATURATION: 98 % | BODY MASS INDEX: 26.71 KG/M2 | RESPIRATION RATE: 16 BRPM | HEART RATE: 89 BPM | SYSTOLIC BLOOD PRESSURE: 151 MMHG | TEMPERATURE: 99 F | DIASTOLIC BLOOD PRESSURE: 103 MMHG | WEIGHT: 208 LBS

## 2021-12-20 DIAGNOSIS — S50.01XA CONTUSION OF RIGHT ELBOW, INITIAL ENCOUNTER: Primary | ICD-10-CM

## 2021-12-20 DIAGNOSIS — T14.90XA INJURY: ICD-10-CM

## 2021-12-20 PROCEDURE — 99283 EMERGENCY DEPT VISIT LOW MDM: CPT | Mod: 25

## 2022-02-10 ENCOUNTER — PATIENT MESSAGE (OUTPATIENT)
Dept: ADMINISTRATIVE | Facility: HOSPITAL | Age: 57
End: 2022-02-10
Payer: COMMERCIAL

## 2022-04-04 ENCOUNTER — PATIENT MESSAGE (OUTPATIENT)
Dept: ADMINISTRATIVE | Facility: HOSPITAL | Age: 57
End: 2022-04-04
Payer: COMMERCIAL

## 2022-06-19 ENCOUNTER — HOSPITAL ENCOUNTER (EMERGENCY)
Facility: HOSPITAL | Age: 57
Discharge: HOME | End: 2022-06-19
Attending: EMERGENCY MEDICINE
Payer: COMMERCIAL

## 2022-06-19 VITALS
SYSTOLIC BLOOD PRESSURE: 140 MMHG | TEMPERATURE: 100 F | BODY MASS INDEX: 25.54 KG/M2 | OXYGEN SATURATION: 95 % | DIASTOLIC BLOOD PRESSURE: 82 MMHG | HEIGHT: 74 IN | HEART RATE: 92 BPM | WEIGHT: 199 LBS | RESPIRATION RATE: 20 BRPM

## 2022-06-19 DIAGNOSIS — U07.1 COVID: Primary | ICD-10-CM

## 2022-06-19 DIAGNOSIS — U07.1 COVID-19 VIRUS DETECTED: ICD-10-CM

## 2022-06-19 LAB
CTP QC/QA: YES
SARS-COV-2 RDRP RESP QL NAA+PROBE: POSITIVE

## 2022-06-19 PROCEDURE — 99284 EMERGENCY DEPT VISIT MOD MDM: CPT

## 2022-06-19 PROCEDURE — U0002 COVID-19 LAB TEST NON-CDC: HCPCS | Performed by: CLINICAL NURSE SPECIALIST

## 2022-06-19 RX ORDER — ONDANSETRON 4 MG/1
4 TABLET, FILM COATED ORAL EVERY 6 HOURS
Qty: 12 TABLET | Refills: 0 | Status: SHIPPED | OUTPATIENT
Start: 2022-06-19 | End: 2023-10-05

## 2022-06-19 RX ORDER — BENZONATATE 100 MG/1
100 CAPSULE ORAL 3 TIMES DAILY PRN
Qty: 20 CAPSULE | Refills: 0 | Status: SHIPPED | OUTPATIENT
Start: 2022-06-19 | End: 2022-06-29

## 2022-06-19 RX ORDER — PROMETHAZINE HYDROCHLORIDE AND DEXTROMETHORPHAN HYDROBROMIDE 6.25; 15 MG/5ML; MG/5ML
5 SYRUP ORAL EVERY 4 HOURS PRN
Qty: 120 ML | Refills: 0 | Status: SHIPPED | OUTPATIENT
Start: 2022-06-19 | End: 2022-06-29

## 2022-06-19 NOTE — Clinical Note
"Jett"Hilaria Ballard was seen and treated in our emergency department on 6/19/2022.     COVID-19 is present in our communities across the state. There is limited testing for COVID at this time, so not all patients can be tested. In this situation, your employee meets the following criteria:    Jett Ballard has met the criteria for COVID-19 testing and has a POSITIVE result. He can return to work once they are asymptomatic for 24 hours without the use of fever reducing medications AND at least five days from the first positive result. A mask is recommended for 5 days post quarantine.     If you have any questions or concerns, or if I can be of further assistance, please do not hesitate to contact me.    Sincerely,             Goran Garcia MD"

## 2022-06-20 NOTE — DISCHARGE INSTRUCTIONS
Quarantine for 5 days.  Alternate Tylenol Motrin as needed for fever, headaches, body aches.  Take over-the-counter medications as needed for your symptoms.  I prescribed some medication for symptomatic treatment if needed

## 2022-06-20 NOTE — ED PROVIDER NOTES
Encounter Date: 6/19/2022       History     Chief Complaint   Patient presents with    Fever     Pt complains of headache and fever x 7 hours. Pt denies any cough, n/v/d, body aches.     Jett Ballard is an 56 y.o. male who complains of subjective fever, headache since noon today.  Denies any recent COVID or flu exposure that he is where of.  Temperature in triage 100.2, no medication given prior to arrival.  History of stroke, hypertension, tremor, weakness        Review of patient's allergies indicates:   Allergen Reactions    Adhesive Rash    Latex, natural rubber Rash     Past Medical History:   Diagnosis Date    Hypercholesteremia     Hypertension     Left sided numbness     Leg weakness     Other and unspecified hyperlipidemia     Positional lightheadedness     Stroke 2014    Tremor     Weakness      Past Surgical History:   Procedure Laterality Date    BACK SURGERY      COLONOSCOPY N/A 7/19/2018    Procedure: COLONOSCOPY;  Surgeon: Patric Carrera MD;  Location: [facility];  Service: Endoscopy;  Laterality: N/A;    TONSILLECTOMY       Family History   Problem Relation Age of Onset    Heart disease Father     Cancer Father      Social History     Tobacco Use    Smoking status: Never Smoker    Smokeless tobacco: Current User     Types: Snuff    Tobacco comment: 33 yr history of dip   Substance Use Topics    Alcohol use: No     Alcohol/week: 0.0 standard drinks    Drug use: No     Review of Systems   Constitutional: Positive for fever.   HENT: Negative for sore throat.    Respiratory: Negative for shortness of breath.    Cardiovascular: Negative for chest pain.   Gastrointestinal: Negative for nausea.   Genitourinary: Negative for dysuria.   Musculoskeletal: Negative for back pain.   Skin: Negative for rash.   Neurological: Positive for headaches. Negative for weakness.   Hematological: Does not bruise/bleed easily.   All other systems reviewed and are negative.      Physical Exam      Initial Vitals [06/19/22 1912]   BP Pulse Resp Temp SpO2   (!) 155/103 104 20 100.2 °F (37.9 °C) 95 %      MAP       --         Physical Exam    Nursing note and vitals reviewed.  Constitutional: He appears well-developed and well-nourished.   HENT:   Head: Normocephalic and atraumatic.   Eyes: Pupils are equal, round, and reactive to light.   Neck:   Normal range of motion.  Cardiovascular: Normal rate and regular rhythm.   Pulmonary/Chest: Breath sounds normal.   Abdominal: Abdomen is soft. Bowel sounds are normal.   Musculoskeletal:         General: Normal range of motion.      Cervical back: Normal range of motion.     Neurological: He is alert and oriented to person, place, and time.   Psychiatric: He has a normal mood and affect.         ED Course   Procedures  Labs Reviewed   SARS-COV-2 RDRP GENE - Abnormal; Notable for the following components:       Result Value    POC Rapid COVID Positive (*)     All other components within normal limits          Imaging Results    None          Medications - No data to display  Medical Decision Making:   Differential Diagnosis:   COVID, URI  Clinical Tests:   Lab Tests: Ordered and Reviewed                      Clinical Impression:   Final diagnoses:  [U07.1] COVID (Primary)          ED Disposition Condition    Discharge Stable        ED Prescriptions     Medication Sig Dispense Start Date End Date Auth. Provider    benzonatate (TESSALON) 100 MG capsule Take 1 capsule (100 mg total) by mouth 3 (three) times daily as needed. 20 capsule 6/19/2022 6/29/2022 Gabrielle Ye NP    promethazine-dextromethorphan (PROMETHAZINE-DM) 6.25-15 mg/5 mL Syrp Take 5 mLs by mouth every 4 (four) hours as needed (cough). 120 mL 6/19/2022 6/29/2022 Gabrielle Ye NP    ondansetron (ZOFRAN) 4 MG tablet Take 1 tablet (4 mg total) by mouth every 6 (six) hours. 12 tablet 6/19/2022  Gabrielle Ye NP        Follow-up Information     Follow up With Specialties Details Why  Contact Info    Yanci Lowe NP Family Medicine  As needed [address]  [place] [address]  [phone]             Gabrielle Ye NP  06/19/22 2024

## 2022-07-13 DIAGNOSIS — I10 ESSENTIAL HYPERTENSION: ICD-10-CM

## 2023-01-23 NOTE — ED TRIAGE NOTES
STATED HE DEVELOPED CHEST DISCOMFORT.    Pt at dialysis fast asleep w/ blankets pulled over his head

## 2023-09-27 PROBLEM — G99.2 MYELOPATHY CONCURRENT WITH AND DUE TO SPINAL STENOSIS OF CERVICAL REGION: Status: RESOLVED | Noted: 2020-03-27 | Resolved: 2023-09-27

## 2023-09-27 PROBLEM — F33.41 MAJOR DEPRESSIVE DISORDER, RECURRENT, IN PARTIAL REMISSION: Status: RESOLVED | Noted: 2020-05-07 | Resolved: 2023-09-27

## 2023-09-27 PROBLEM — Z00.00 ENCOUNTER FOR MEDICAL EXAMINATION TO ESTABLISH CARE: Status: ACTIVE | Noted: 2023-09-27

## 2023-09-27 PROBLEM — M48.02 MYELOPATHY CONCURRENT WITH AND DUE TO SPINAL STENOSIS OF CERVICAL REGION: Status: RESOLVED | Noted: 2020-03-27 | Resolved: 2023-09-27

## 2023-09-28 ENCOUNTER — LAB VISIT (OUTPATIENT)
Dept: LAB | Facility: HOSPITAL | Age: 58
End: 2023-09-28
Payer: COMMERCIAL

## 2023-09-28 DIAGNOSIS — Z13.29 THYROID DISORDER SCREEN: ICD-10-CM

## 2023-09-28 DIAGNOSIS — E78.2 MIXED HYPERLIPIDEMIA: ICD-10-CM

## 2023-09-28 DIAGNOSIS — Z12.5 PROSTATE CANCER SCREENING: ICD-10-CM

## 2023-09-28 DIAGNOSIS — I10 ESSENTIAL HYPERTENSION: ICD-10-CM

## 2023-09-28 DIAGNOSIS — Z00.00 ENCOUNTER FOR MEDICAL EXAMINATION TO ESTABLISH CARE: ICD-10-CM

## 2023-09-28 DIAGNOSIS — Z86.73 HISTORY OF CVA (CEREBROVASCULAR ACCIDENT): ICD-10-CM

## 2023-09-28 LAB
ALBUMIN SERPL BCP-MCNC: 4 G/DL (ref 3.5–5.2)
ALP SERPL-CCNC: 92 U/L (ref 55–135)
ALT SERPL W/O P-5'-P-CCNC: 42 U/L (ref 10–44)
ANION GAP SERPL CALC-SCNC: 2 MMOL/L (ref 3–11)
AST SERPL-CCNC: 23 U/L (ref 10–40)
BASOPHILS # BLD AUTO: 0.03 K/UL (ref 0–0.2)
BASOPHILS NFR BLD: 0.5 % (ref 0–1.9)
BILIRUB SERPL-MCNC: 0.5 MG/DL (ref 0.1–1)
BUN SERPL-MCNC: 18 MG/DL (ref 6–20)
CALCIUM SERPL-MCNC: 9.4 MG/DL (ref 8.7–10.5)
CHLORIDE SERPL-SCNC: 108 MMOL/L (ref 95–110)
CHOLEST SERPL-MCNC: 181 MG/DL (ref 120–199)
CHOLEST/HDLC SERPL: 2.9 {RATIO} (ref 2–5)
CO2 SERPL-SCNC: 32 MMOL/L (ref 23–29)
COMPLEXED PSA SERPL-MCNC: 4 NG/ML (ref 0–4)
CREAT SERPL-MCNC: 1.1 MG/DL (ref 0.5–1.4)
DIFFERENTIAL METHOD: ABNORMAL
EOSINOPHIL # BLD AUTO: 0.1 K/UL (ref 0–0.5)
EOSINOPHIL NFR BLD: 1.1 % (ref 0–8)
ERYTHROCYTE [DISTWIDTH] IN BLOOD BY AUTOMATED COUNT: 12.2 % (ref 11.5–14.5)
EST. GFR  (NO RACE VARIABLE): >60 ML/MIN/1.73 M^2
GLUCOSE SERPL-MCNC: 105 MG/DL (ref 70–110)
HCT VFR BLD AUTO: 52.1 % (ref 40–54)
HDLC SERPL-MCNC: 62 MG/DL (ref 40–75)
HDLC SERPL: 34.3 % (ref 20–50)
HGB BLD-MCNC: 17.2 G/DL (ref 14–18)
IMM GRANULOCYTES # BLD AUTO: 0.01 K/UL (ref 0–0.04)
IMM GRANULOCYTES NFR BLD AUTO: 0.2 % (ref 0–0.5)
LDLC SERPL CALC-MCNC: 106.4 MG/DL (ref 63–159)
LYMPHOCYTES # BLD AUTO: 0.8 K/UL (ref 1–4.8)
LYMPHOCYTES NFR BLD: 14.3 % (ref 18–48)
MCH RBC QN AUTO: 30.9 PG (ref 27–31)
MCHC RBC AUTO-ENTMCNC: 33 G/DL (ref 32–36)
MCV RBC AUTO: 94 FL (ref 82–98)
MONOCYTES # BLD AUTO: 0.4 K/UL (ref 0.3–1)
MONOCYTES NFR BLD: 7 % (ref 4–15)
NEUTROPHILS # BLD AUTO: 4.3 K/UL (ref 1.8–7.7)
NEUTROPHILS NFR BLD: 76.9 % (ref 38–73)
NONHDLC SERPL-MCNC: 119 MG/DL
NRBC BLD-RTO: 0 /100 WBC
PLATELET # BLD AUTO: 257 K/UL (ref 150–450)
PMV BLD AUTO: 9.7 FL (ref 9.2–12.9)
POTASSIUM SERPL-SCNC: 4.2 MMOL/L (ref 3.5–5.1)
PROT SERPL-MCNC: 7.8 G/DL (ref 6–8.4)
RBC # BLD AUTO: 5.57 M/UL (ref 4.6–6.2)
SODIUM SERPL-SCNC: 142 MMOL/L (ref 136–145)
TRIGL SERPL-MCNC: 63 MG/DL (ref 30–150)
TSH SERPL DL<=0.005 MIU/L-ACNC: 0.9 UIU/ML (ref 0.4–4)
WBC # BLD AUTO: 5.54 K/UL (ref 3.9–12.7)

## 2023-09-28 PROCEDURE — 36415 COLL VENOUS BLD VENIPUNCTURE: CPT

## 2023-09-28 PROCEDURE — 85025 COMPLETE CBC W/AUTO DIFF WBC: CPT

## 2023-09-28 PROCEDURE — 80061 LIPID PANEL: CPT

## 2023-09-28 PROCEDURE — 84153 ASSAY OF PSA TOTAL: CPT

## 2023-09-28 PROCEDURE — 80053 COMPREHEN METABOLIC PANEL: CPT

## 2023-09-28 PROCEDURE — 84443 ASSAY THYROID STIM HORMONE: CPT

## 2023-10-05 PROBLEM — Z72.0 SMOKELESS TOBACCO USE: Status: ACTIVE | Noted: 2023-10-05

## 2023-12-07 NOTE — ANESTHESIA POSTPROCEDURE EVALUATION
Anesthesia Post Evaluation    Patient: Jett Ballard    Procedure(s) Performed: Procedure(s) (LRB):  COLONOSCOPY (N/A)    Final Anesthesia Type: general  Patient location during evaluation: PACU  Patient participation: Yes- Able to Participate  Level of consciousness: awake and alert, oriented and awake  Post-procedure vital signs: reviewed and stable  Pain management: adequate  Airway patency: patent  PONV status at discharge: No PONV  Anesthetic complications: no      Cardiovascular status: blood pressure returned to baseline, hemodynamically stable and stable  Respiratory status: unassisted, spontaneous ventilation and room air  Hydration status: euvolemic  Follow-up not needed.        Visit Vitals  /63 (BP Location: Right arm, Patient Position: Lying)   Pulse (!) 57   Resp 18   SpO2 98%       Pain/Isaura Score: Pain Assessment Performed: Yes (7/19/2018 12:50 PM)  Presence of Pain: non-verbal indicators absent (7/19/2018 12:50 PM)  Isaura Score: 10 (7/19/2018  1:10 PM)      
none

## 2023-12-21 ENCOUNTER — HOSPITAL ENCOUNTER (EMERGENCY)
Facility: HOSPITAL | Age: 58
Discharge: HOME | End: 2023-12-21
Attending: EMERGENCY MEDICINE
Payer: COMMERCIAL

## 2023-12-21 VITALS
SYSTOLIC BLOOD PRESSURE: 126 MMHG | DIASTOLIC BLOOD PRESSURE: 85 MMHG | HEART RATE: 108 BPM | OXYGEN SATURATION: 98 % | BODY MASS INDEX: 26.95 KG/M2 | WEIGHT: 210 LBS | HEIGHT: 74 IN | TEMPERATURE: 99 F | RESPIRATION RATE: 18 BRPM

## 2023-12-21 DIAGNOSIS — U07.1 COVID: Primary | ICD-10-CM

## 2023-12-21 DIAGNOSIS — U07.1 COVID-19 VIRUS DETECTED: ICD-10-CM

## 2023-12-21 LAB
CTP QC/QA: YES
CTP QC/QA: YES
POC MOLECULAR INFLUENZA A AGN: NEGATIVE
POC MOLECULAR INFLUENZA B AGN: NEGATIVE
SARS-COV-2 RDRP RESP QL NAA+PROBE: POSITIVE

## 2023-12-21 PROCEDURE — 87502 INFLUENZA DNA AMP PROBE: CPT

## 2023-12-21 PROCEDURE — 87635 SARS-COV-2 COVID-19 AMP PRB: CPT | Performed by: CLINICAL NURSE SPECIALIST

## 2023-12-21 PROCEDURE — 99283 EMERGENCY DEPT VISIT LOW MDM: CPT

## 2023-12-21 RX ORDER — FLUTICASONE PROPIONATE 50 MCG
1 SPRAY, SUSPENSION (ML) NASAL 2 TIMES DAILY PRN
Qty: 15 G | Refills: 0 | Status: SHIPPED | OUTPATIENT
Start: 2023-12-21

## 2023-12-21 RX ORDER — CETIRIZINE HYDROCHLORIDE 10 MG/1
10 TABLET ORAL DAILY
Qty: 30 TABLET | Refills: 0 | Status: SHIPPED | OUTPATIENT
Start: 2023-12-21 | End: 2024-12-20

## 2023-12-21 RX ORDER — PROMETHAZINE HYDROCHLORIDE AND DEXTROMETHORPHAN HYDROBROMIDE 6.25; 15 MG/5ML; MG/5ML
10 SYRUP ORAL EVERY 6 HOURS PRN
Qty: 120 ML | Refills: 0 | Status: SHIPPED | OUTPATIENT
Start: 2023-12-21 | End: 2023-12-31

## 2023-12-21 NOTE — Clinical Note
"Jett Arredondolala Ballard was seen and treated in our emergency department on 12/21/2023.  He may return to work on 12/26/2023.       If you have any questions or concerns, please don't hesitate to call.      Teofilo Gibbons MD"

## 2023-12-21 NOTE — DISCHARGE INSTRUCTIONS
Quarantine for 5 days.  Alternate Tylenol Motrin as needed for fever greater than 100.4, body aches, headache.

## 2023-12-21 NOTE — ED PROVIDER NOTES
Encounter Date: 12/21/2023       History     Chief Complaint   Patient presents with    Cough     Pt stated that he began experiencing cough / congestion / runny nose since last night.      58-year-old male presents emergency room with coughing, congestion, runny nose since last night.        Review of patient's allergies indicates:   Allergen Reactions    Adhesive Rash    Latex, natural rubber Rash     Past Medical History:   Diagnosis Date    History of COVID-19 01/2023    Left sided numbness     Positional lightheadedness     Weakness      Past Surgical History:   Procedure Laterality Date    BACK SURGERY      COLONOSCOPY N/A 7/19/2018    Procedure: COLONOSCOPY;  Surgeon: Patric Carrera MD;  Location: [facility];  Service: Endoscopy;  Laterality: N/A;    TONSILLECTOMY       Family History   Problem Relation Age of Onset    Heart disease Father     Cancer Father      Social History     Tobacco Use    Smoking status: Never    Smokeless tobacco: Current     Types: Snuff    Tobacco comments:     33 yr history of dip   Substance Use Topics    Alcohol use: No     Alcohol/week: 0.0 standard drinks of alcohol    Drug use: No     Review of Systems   Constitutional:  Negative for fever.   HENT:  Positive for congestion and rhinorrhea. Negative for sore throat.    Respiratory:  Positive for cough. Negative for shortness of breath.    Cardiovascular:  Negative for chest pain.   Gastrointestinal:  Negative for nausea.   Genitourinary:  Negative for dysuria.   Musculoskeletal:  Negative for back pain.   Skin:  Negative for rash.   Neurological:  Negative for weakness.   Hematological:  Does not bruise/bleed easily.   All other systems reviewed and are negative.      Physical Exam     Initial Vitals [12/21/23 0912]   BP Pulse Resp Temp SpO2   126/85 108 18 98.5 °F (36.9 °C) 98 %      MAP       --         Physical Exam    Nursing note and vitals reviewed.  Constitutional: He appears well-developed and well-nourished.   HENT:    Head: Normocephalic and atraumatic.   Eyes: Pupils are equal, round, and reactive to light.   Neck:   Normal range of motion.  Cardiovascular:  Normal rate and regular rhythm.           Pulmonary/Chest: Breath sounds normal.   Abdominal: Abdomen is soft. Bowel sounds are normal.   Musculoskeletal:         General: Normal range of motion.      Cervical back: Normal range of motion.     Neurological: He is alert and oriented to person, place, and time.   Psychiatric: He has a normal mood and affect.         ED Course   Procedures  Labs Reviewed   SARS-COV-2 RDRP GENE - Abnormal; Notable for the following components:       Result Value    POC Rapid COVID Positive (*)     All other components within normal limits    Narrative:     .This test utilizes isothermal nucleic acid amplification technology to detect the SARS-CoV-2 RdRp nucleic acid segment. The analytical sensitivity (limit of detection) is 500 copies/swab.     A POSITIVE result is indicative of the presence of SARS-CoV-2 RNA; clinical correlation with patient history and other diagnostic information is necessary to determine patient infection status.    A NEGATIVE result means that SARS-CoV-2 nucleic acids are not present above the limit of detection. A NEGATIVE result should be treated as presumptive. It does not rule out the possibility of COVID-19 and should not be the sole basis for treatment decisions. If COVID-19 is strongly suspected based on clinical and exposure history, re-testing using an alternate molecular assay should be considered.     This test is only for use under the Food and Drug Administration s Emergency Use Authorization (EUA).     Commercial kits are provided by [employer]. Performance characteristics of the EUA have been independently verified by [facility] Department of Pathology and Laboratory Medicine.   _________________________________________________________________   The authorized Fact Sheet for Healthcare  Providers and the authorized Fact Sheet for Patients of the ID NOW COVID-19 are available on the FDA website:    https://www.fda.gov/media/163416/download      https://www.fda.gov/media/890643/download       POCT INFLUENZA A/B MOLECULAR          Imaging Results    None          Medications - No data to display  Medical Decision Making  Amount and/or Complexity of Data Reviewed  Labs: ordered.    Risk  OTC drugs.  Prescription drug management.                                      Clinical Impression:  Final diagnoses:  [U07.1] COVID (Primary)          ED Disposition Condition    Discharge Stable          ED Prescriptions       Medication Sig Dispense Start Date End Date Auth. Provider    promethazine-dextromethorphan (PROMETHAZINE-DM) 6.25-15 mg/5 mL Syrp Take 10 mLs by mouth every 6 (six) hours as needed. 120 mL 12/21/2023 12/31/2023 Gabrielle Ye NP    fluticasone propionate (FLONASE) 50 mcg/actuation nasal spray 1 spray (50 mcg total) by Each Nostril route 2 (two) times daily as needed. 15 g 12/21/2023 -- Gabrielle Ye NP    cetirizine (ZYRTEC) 10 MG tablet Take 1 tablet (10 mg total) by mouth once daily. 30 tablet 12/21/2023 12/20/2024 Gabrielle Ye NP          Follow-up Information       Follow up With Specialties Details Why Contact Info    Nikita Doll III, MD Internal Medicine  As needed [address] [facility] [id number] [phone]               Gabrielle Ye NP  12/21/23 1136

## 2024-01-01 PROBLEM — Z00.00 ENCOUNTER FOR MEDICAL EXAMINATION TO ESTABLISH CARE: Status: RESOLVED | Noted: 2023-09-27 | Resolved: 2024-01-01

## 2024-10-21 ENCOUNTER — PATIENT MESSAGE (OUTPATIENT)
Dept: FAMILY MEDICINE | Facility: CLINIC | Age: 59
End: 2024-10-21
Payer: COMMERCIAL

## 2024-11-12 ENCOUNTER — HOSPITAL ENCOUNTER (EMERGENCY)
Facility: HOSPITAL | Age: 59
Discharge: HOME | End: 2024-11-12
Attending: FAMILY MEDICINE
Payer: COMMERCIAL

## 2024-11-12 VITALS
HEART RATE: 76 BPM | RESPIRATION RATE: 18 BRPM | BODY MASS INDEX: 26.31 KG/M2 | TEMPERATURE: 99 F | SYSTOLIC BLOOD PRESSURE: 158 MMHG | DIASTOLIC BLOOD PRESSURE: 87 MMHG | OXYGEN SATURATION: 98 % | WEIGHT: 205 LBS | HEIGHT: 74 IN

## 2024-11-12 DIAGNOSIS — J06.9 VIRAL URI: Primary | ICD-10-CM

## 2024-11-12 LAB
GROUP A STREP, MOLECULAR: NEGATIVE
INFLUENZA A, MOLECULAR: NEGATIVE
INFLUENZA B, MOLECULAR: NEGATIVE
SARS-COV-2 RDRP RESP QL NAA+PROBE: NEGATIVE
SPECIMEN SOURCE: NORMAL

## 2024-11-12 PROCEDURE — 87651 STREP A DNA AMP PROBE: CPT | Performed by: FAMILY MEDICINE

## 2024-11-12 PROCEDURE — 96372 THER/PROPH/DIAG INJ SC/IM: CPT | Performed by: FAMILY MEDICINE

## 2024-11-12 PROCEDURE — 63600175 PHARM REV CODE 636 W HCPCS: Performed by: FAMILY MEDICINE

## 2024-11-12 PROCEDURE — 25000003 PHARM REV CODE 250: Performed by: FAMILY MEDICINE

## 2024-11-12 PROCEDURE — 99284 EMERGENCY DEPT VISIT MOD MDM: CPT | Mod: 25

## 2024-11-12 PROCEDURE — 87502 INFLUENZA DNA AMP PROBE: CPT | Performed by: FAMILY MEDICINE

## 2024-11-12 PROCEDURE — 87635 SARS-COV-2 COVID-19 AMP PRB: CPT | Performed by: FAMILY MEDICINE

## 2024-11-12 RX ORDER — DEXAMETHASONE SODIUM PHOSPHATE 4 MG/ML
8 INJECTION, SOLUTION INTRA-ARTICULAR; INTRALESIONAL; INTRAMUSCULAR; INTRAVENOUS; SOFT TISSUE
Status: COMPLETED | OUTPATIENT
Start: 2024-11-12 | End: 2024-11-12

## 2024-11-12 RX ORDER — IBUPROFEN 600 MG/1
600 TABLET ORAL
Status: COMPLETED | OUTPATIENT
Start: 2024-11-12 | End: 2024-11-12

## 2024-11-12 RX ORDER — ACETAMINOPHEN 325 MG/1
650 TABLET ORAL
Status: COMPLETED | OUTPATIENT
Start: 2024-11-12 | End: 2024-11-12

## 2024-11-12 RX ADMIN — IBUPROFEN 600 MG: 600 TABLET ORAL at 04:11

## 2024-11-12 RX ADMIN — DEXAMETHASONE SODIUM PHOSPHATE 8 MG: 4 INJECTION, SOLUTION INTRA-ARTICULAR; INTRALESIONAL; INTRAMUSCULAR; INTRAVENOUS; SOFT TISSUE at 05:11

## 2024-11-12 RX ADMIN — ACETAMINOPHEN 650 MG: 325 TABLET ORAL at 04:11

## 2024-11-12 NOTE — DISCHARGE INSTRUCTIONS
Please follow up with your primary care physician within 2 days. Ensure that you review all lab work results and/or imaging results. If you have any questions about your discharge paperwork please call the Emergency Department.    Return to the ED for any fevers, nausea, vomiting, abdominal pain, diarrhea, inability to take food or water by mouth without vomiting, or any new or worsening symptoms.       If you were prescribed antibiotics, please take them to completion. If you were prescribed a narcotic or any sedating medication, do not drive or operate heavy equipment or machinery while taking these medications.  If you were diagnosed with a seizure, syncope, any loss of consciousness or decreased alertness, do not drive, swim, operate heavy machinery, or put yourself in any position where a sudden loss of consciousness could put yourself or others in danger.    Thank you for visiting [facility], Department of Emergency Medicine. Please see the entirety of the educational materials provided. Please note that a visit to the emergency department does not substitute ongoing care from a primary medical provider or specialist. Please ensure to follow up as recommended. However, please return to the emergency department immediately if symptoms do not improve as discussed, symptoms worsen, new symptoms develop, difficulty in following up or for any of your concerns or issues. Please note on discharge you are acknowledging understanding and agreement on medical evaluation, management recommendations and follow up recommendations.

## 2024-11-12 NOTE — Clinical Note
"Jett Arredondolala Ballard was seen and treated in our emergency department on 11/12/2024.  He may return to work on 11/14/2024.       If you have any questions or concerns, please don't hesitate to call.      Audra Cruz MD"

## 2024-11-12 NOTE — ED PROVIDER NOTES
Encounter Date: 11/12/2024       History     Chief Complaint   Patient presents with    URI     Pt to ED with c/o nasal congestion, sore throat, cough and body aches that began yesterday.      HPI  59-year-old male with a past medical history of hyperlipidemia, hypertension that presents to the ED with cough, nasal congestion, sore throat and body aches that started yesterday.  He denies any chest pain, shortness of breath.  No nausea, vomiting, diarrhea.  He did take Tylenol and Motrin prior to my evaluation.  Review of patient's allergies indicates:   Allergen Reactions    Adhesive Rash    Latex, natural rubber Rash     Past Medical History:   Diagnosis Date    History of COVID-19 01/2023    Left sided numbness     Positional lightheadedness     Weakness      Past Surgical History:   Procedure Laterality Date    BACK SURGERY      COLONOSCOPY N/A 7/19/2018    Procedure: COLONOSCOPY;  Surgeon: Patric Carrera MD;  Location: [facility];  Service: Endoscopy;  Laterality: N/A;    TONSILLECTOMY       Family History   Problem Relation Name Age of Onset    Heart disease Father      Cancer Father       Social History     Tobacco Use    Smoking status: Never    Smokeless tobacco: Current     Types: Snuff    Tobacco comments:     33 yr history of dip   Substance Use Topics    Alcohol use: No     Alcohol/week: 0.0 standard drinks of alcohol    Drug use: No     Review of Systems   Constitutional:  Negative for chills and fever.   HENT:  Positive for congestion, rhinorrhea and sore throat.    Eyes:  Negative for visual disturbance.   Respiratory:  Negative for shortness of breath and wheezing.    Cardiovascular:  Negative for chest pain and palpitations.   Gastrointestinal:  Negative for diarrhea, nausea and vomiting.   Genitourinary:  Negative for dysuria.   Musculoskeletal:  Positive for myalgias.   Skin:  Negative for rash.   Neurological:  Negative for headaches.   Psychiatric/Behavioral:  Negative for behavioral problems.     All other systems reviewed and are negative.      Physical Exam     Initial Vitals   BP Pulse Resp Temp SpO2   11/12/24 0403 11/12/24 0403 11/12/24 0403 11/12/24 0404 11/12/24 0403   (!) 158/87 76 18 98.6 °F (37 °C) 98 %      MAP       --                Physical Exam    Constitutional: He appears well-developed and well-nourished. He is not diaphoretic. No distress.   HENT:   Head: Normocephalic and atraumatic.   Right Ear: External ear normal.   Left Ear: External ear normal. Mouth/Throat: Posterior oropharyngeal erythema present. No oropharyngeal exudate or posterior oropharyngeal edema.   Eyes: EOM are normal. Pupils are equal, round, and reactive to light.   Neck:   Normal range of motion.  Cardiovascular:  Normal rate and regular rhythm.           No murmur heard.  Pulmonary/Chest: Breath sounds normal. No respiratory distress. He has no wheezes.   Abdominal: Abdomen is soft. He exhibits no distension. There is no abdominal tenderness.   Musculoskeletal:         General: Normal range of motion.      Cervical back: Normal range of motion.     Neurological: He is alert and oriented to person, place, and time. GCS score is 15. GCS eye subscore is 4. GCS verbal subscore is 5. GCS motor subscore is 6.   Skin: Skin is warm and dry. No rash noted.   Psychiatric: He has a normal mood and affect.         ED Course   Procedures  Labs Reviewed   INFLUENZA A & B BY MOLECULAR       Result Value    Influenza A, Molecular Negative      Influenza B, Molecular Negative      Flu A & B Source Nasal swab     GROUP A STREP, MOLECULAR    Group A Strep, Molecular Negative     SARS-COV-2 RNA AMPLIFICATION, QUAL    SARS-CoV-2 RNA, Amplification, Qual Negative            Imaging Results    None          Medications   dexAMETHasone injection 8 mg (has no administration in time range)   ibuprofen tablet 600 mg (600 mg Oral Given 11/12/24 0408)   acetaminophen tablet 650 mg (650 mg Oral Given 11/12/24 0408)     Medical Decision  Making  Differential diagnosis:  COVID, influenza, strep    59-year-old male with history of hypertension, hyperlipidemia that presents to the ED with cough, congestion, runny nose.  Patient states symptoms started yesterday.  COVID, flu Co shut negative.  No respiratory distress my exam.  Decadron given.  Strict return precautions discussed.  Have discussed that is potentially early in a course of COVID or flu.  No antibiotics indicated at this time.  All questions answered prior to discharge home.    Risk  OTC drugs.  Prescription drug management.                                      Clinical Impression:  Final diagnoses:  [J06.9] Viral URI (Primary)          ED Disposition Condition    Discharge Stable          ED Prescriptions    None       Follow-up Information       Follow up With Specialties Details Why Contact Info    Nikita Doll III, MD Internal Medicine In 2 days  [facility] [facility] [address]  [phone]               Audra Cruz MD  11/12/24 0150

## 2024-11-27 ENCOUNTER — HOSPITAL ENCOUNTER (EMERGENCY)
Facility: HOSPITAL | Age: 59
Discharge: HOME | End: 2024-11-27
Attending: EMERGENCY MEDICINE
Payer: COMMERCIAL

## 2024-11-27 VITALS
TEMPERATURE: 98 F | RESPIRATION RATE: 16 BRPM | SYSTOLIC BLOOD PRESSURE: 145 MMHG | HEART RATE: 82 BPM | OXYGEN SATURATION: 99 % | BODY MASS INDEX: 24.91 KG/M2 | WEIGHT: 194 LBS | DIASTOLIC BLOOD PRESSURE: 95 MMHG

## 2024-11-27 DIAGNOSIS — J02.9 VIRAL PHARYNGITIS: ICD-10-CM

## 2024-11-27 DIAGNOSIS — J06.9 VIRAL URI WITH COUGH: Primary | ICD-10-CM

## 2024-11-27 LAB
CTP QC/QA: YES
CTP QC/QA: YES
GROUP A STREP, MOLECULAR: NEGATIVE
POC MOLECULAR INFLUENZA A AGN: NEGATIVE
POC MOLECULAR INFLUENZA B AGN: NEGATIVE
SARS-COV-2 RDRP RESP QL NAA+PROBE: NEGATIVE

## 2024-11-27 PROCEDURE — 87502 INFLUENZA DNA AMP PROBE: CPT

## 2024-11-27 PROCEDURE — 99284 EMERGENCY DEPT VISIT MOD MDM: CPT | Mod: 25

## 2024-11-27 PROCEDURE — 87635 SARS-COV-2 COVID-19 AMP PRB: CPT | Performed by: EMERGENCY MEDICINE

## 2024-11-27 PROCEDURE — 63600175 PHARM REV CODE 636 W HCPCS: Performed by: EMERGENCY MEDICINE

## 2024-11-27 PROCEDURE — 96372 THER/PROPH/DIAG INJ SC/IM: CPT | Performed by: EMERGENCY MEDICINE

## 2024-11-27 PROCEDURE — 87651 STREP A DNA AMP PROBE: CPT | Performed by: EMERGENCY MEDICINE

## 2024-11-27 RX ORDER — DEXAMETHASONE SODIUM PHOSPHATE 4 MG/ML
8 INJECTION, SOLUTION INTRA-ARTICULAR; INTRALESIONAL; INTRAMUSCULAR; INTRAVENOUS; SOFT TISSUE
Status: COMPLETED | OUTPATIENT
Start: 2024-11-27 | End: 2024-11-27

## 2024-11-27 RX ADMIN — DEXAMETHASONE SODIUM PHOSPHATE 8 MG: 4 INJECTION INTRA-ARTICULAR; INTRALESIONAL; INTRAMUSCULAR; INTRAVENOUS; SOFT TISSUE at 06:11

## 2024-11-27 NOTE — Clinical Note
"Jett Biggs" Elio was seen and treated in our emergency department on 11/27/2024.  He may return to work on 11/30/2024.       If you have any questions or concerns, please don't hesitate to call.      Samanta SALGADO    "

## 2024-11-27 NOTE — ED PROVIDER NOTES
Encounter Date: 11/27/2024       History     Chief Complaint   Patient presents with    Sore Throat     Patient presents to ED with sore throat and congestion onset yesterday.     59-year-old male who presents to the emergency department complaining of sore throat nasal congestion, and cough.  Onset 1 day prior to ED evaluation.  No associated symptoms.  No dyspnea.  No wheezing.  No stridor.  Tolerating oral secretions.        Review of patient's allergies indicates:   Allergen Reactions    Adhesive Rash    Latex, natural rubber Rash     Past Medical History:   Diagnosis Date    History of COVID-19 01/2023    Hypertension     Left sided numbness     Positional lightheadedness     Weakness      Past Surgical History:   Procedure Laterality Date    BACK SURGERY      COLONOSCOPY N/A 7/19/2018    Procedure: COLONOSCOPY;  Surgeon: Patric Carrera MD;  Location: [facility];  Service: Endoscopy;  Laterality: N/A;    TONSILLECTOMY       Family History   Problem Relation Name Age of Onset    Heart disease Father      Cancer Father       Social History     Tobacco Use    Smoking status: Never    Smokeless tobacco: Current     Types: Snuff    Tobacco comments:     33 yr history of dip   Substance Use Topics    Alcohol use: No     Alcohol/week: 0.0 standard drinks of alcohol    Drug use: No     Review of Systems   HENT:  Positive for congestion.    Respiratory:  Positive for cough.    All other systems reviewed and are negative.      Physical Exam     Initial Vitals [11/27/24 0627]   BP Pulse Resp Temp SpO2   (!) 154/104 86 18 98.1 °F (36.7 °C) 98 %      MAP       --         Physical Exam    Nursing note and vitals reviewed.  Constitutional: He appears well-developed.   HENT:   Head: Normocephalic and atraumatic.   Eyes: EOM are normal. Pupils are equal, round, and reactive to light.   Neck:   Normal range of motion.  Cardiovascular:  Normal rate, regular rhythm and normal heart sounds.           Pulmonary/Chest: Breath sounds  normal.   Abdominal: Abdomen is soft. Bowel sounds are normal. He exhibits no distension. There is no abdominal tenderness. There is no rebound.   Musculoskeletal:         General: Normal range of motion.      Cervical back: Normal range of motion.     Neurological: He is alert and oriented to person, place, and time. He has normal strength. GCS score is 15.   Skin: Skin is warm and dry.   Psychiatric: He has a normal mood and affect. His mood appears not anxious.         ED Course   Procedures  Labs Reviewed   GROUP A STREP, MOLECULAR       Result Value    Group A Strep, Molecular Negative     POCT INFLUENZA A/B MOLECULAR    POC Molecular Influenza A Ag Negative      POC Molecular Influenza B Ag Negative       Acceptable Yes     SARS-COV-2 RDRP GENE    POC Rapid COVID Negative       Acceptable Yes      Narrative:     .This test utilizes isothermal nucleic acid amplification technology to detect the SARS-CoV-2 RdRp nucleic acid segment. The analytical sensitivity (limit of detection) is 500 copies/swab.     A POSITIVE result is indicative of the presence of SARS-CoV-2 RNA; clinical correlation with patient history and other diagnostic information is necessary to determine patient infection status.    A NEGATIVE result means that SARS-CoV-2 nucleic acids are not present above the limit of detection. A NEGATIVE result should be treated as presumptive. It does not rule out the possibility of COVID-19 and should not be the sole basis for treatment decisions. If COVID-19 is strongly suspected based on clinical and exposure history, re-testing using an alternate molecular assay should be considered.     Commercial kits are provided by [employer].               Imaging Results    None          Medications   dexAMETHasone injection 8 mg (8 mg Intramuscular Given 11/27/24 0641)     Medical Decision Making             ED Course as of 11/27/24 0806   Wed Nov 27, 2024   0802 No acute  distress.  Influenza negative.  SARs COVID 19 is negative.  Group A Strep is negative.  Lungs clear to auscultation bilaterally.  Oxygen saturation 98% on room air.  No dyspnea.  No wheezing.  No stridor.  Tolerating oral secretions.  History of chronic hypertension.  No headache.  No dizziness.  No chest pain.  No palpitations.  The patient is awake, alert, and oriented to person, place, time, and situation.  No focal deficits.  Cranial nerves 2-12 are grossly intact bilaterally.  GCS 15.  Vital signs stable.  Afebrile.  Discharge home. [DH]      ED Course User Index  [DH] Jose Koehler DO                             Clinical Impression:  Final diagnoses:  [J06.9] Viral URI with cough (Primary)  [J02.9] Viral pharyngitis          ED Disposition Condition    Discharge Stable          ED Prescriptions    None       Follow-up Information       Follow up With Specialties Details Why Contact Info    Nikita Doll III, MD Internal Medicine In 3 days  [facility] [facility] [address]  [phone]               Jose Koehler DO  11/27/24 0806

## 2025-02-28 ENCOUNTER — OFFICE VISIT (OUTPATIENT)
Dept: FAMILY MEDICINE | Facility: CLINIC | Age: 60
End: 2025-02-28
Payer: COMMERCIAL

## 2025-02-28 VITALS
SYSTOLIC BLOOD PRESSURE: 142 MMHG | DIASTOLIC BLOOD PRESSURE: 96 MMHG | WEIGHT: 199.63 LBS | HEART RATE: 79 BPM | BODY MASS INDEX: 25.62 KG/M2 | OXYGEN SATURATION: 98 % | HEIGHT: 74 IN | RESPIRATION RATE: 14 BRPM

## 2025-02-28 DIAGNOSIS — Z76.89 ENCOUNTER TO ESTABLISH CARE: Primary | ICD-10-CM

## 2025-02-28 DIAGNOSIS — Z86.73 HISTORY OF CVA (CEREBROVASCULAR ACCIDENT): ICD-10-CM

## 2025-02-28 DIAGNOSIS — Z86.0100 HISTORY OF COLON POLYPS: ICD-10-CM

## 2025-02-28 DIAGNOSIS — I10 ESSENTIAL HYPERTENSION: ICD-10-CM

## 2025-02-28 DIAGNOSIS — Z12.5 SCREENING PSA (PROSTATE SPECIFIC ANTIGEN): ICD-10-CM

## 2025-02-28 DIAGNOSIS — Z12.11 COLON CANCER SCREENING: ICD-10-CM

## 2025-02-28 DIAGNOSIS — E78.2 MIXED HYPERLIPIDEMIA: ICD-10-CM

## 2025-02-28 DIAGNOSIS — Z13.1 DIABETES MELLITUS SCREENING: ICD-10-CM

## 2025-02-28 PROBLEM — I88.9 AXILLARY LYMPHADENITIS: Status: RESOLVED | Noted: 2020-01-13 | Resolved: 2025-02-28

## 2025-02-28 PROCEDURE — 99999 PR PBB SHADOW E&M-EST. PATIENT-LVL III: CPT | Mod: PBBFAC,,, | Performed by: STUDENT IN AN ORGANIZED HEALTH CARE EDUCATION/TRAINING PROGRAM

## 2025-02-28 RX ORDER — ROSUVASTATIN CALCIUM 20 MG/1
TABLET, COATED ORAL
Qty: 90 TABLET | Refills: 1 | Status: SHIPPED | OUTPATIENT
Start: 2025-02-28

## 2025-02-28 RX ORDER — AMLODIPINE AND BENAZEPRIL HYDROCHLORIDE 5; 20 MG/1; MG/1
1 CAPSULE ORAL DAILY
Qty: 90 CAPSULE | Refills: 1 | Status: SHIPPED | OUTPATIENT
Start: 2025-02-28

## 2025-02-28 NOTE — PROGRESS NOTES
Subjective:       Patient ID: Jett Ballard is a 59 y.o. male.    Chief Complaint: Establish Care (Patient is here today today to establish care. )    Pt here to establish care.     HTN: he is on amlodipine 5/20mg daily but he is out of meds.    HLD: on statin.     He has a history of CVA: he is on asa 81mg daily.    Review of Systems   Constitutional:  Negative for chills and fever.   HENT:  Negative for congestion and sore throat.    Respiratory:  Negative for cough and shortness of breath.    Cardiovascular:  Negative for chest pain.   Gastrointestinal:  Negative for abdominal pain, diarrhea, nausea and vomiting.   Genitourinary:  Negative for dysuria and hematuria.       Objective:      Physical Exam  Vitals reviewed.   Constitutional:       General: He is not in acute distress.  HENT:      Head: Normocephalic and atraumatic.   Eyes:      Conjunctiva/sclera: Conjunctivae normal.   Cardiovascular:      Rate and Rhythm: Normal rate and regular rhythm.      Heart sounds: Normal heart sounds. No murmur heard.  Pulmonary:      Effort: Pulmonary effort is normal. No respiratory distress.      Breath sounds: Normal breath sounds.   Musculoskeletal:      Cervical back: Neck supple.   Neurological:      General: No focal deficit present.      Mental Status: He is alert and oriented to person, place, and time.   Psychiatric:         Mood and Affect: Mood normal.         Behavior: Behavior normal.         Assessment:       1. Encounter to establish care    2. History of CVA (cerebrovascular accident)    3. Mixed hyperlipidemia    4. Essential hypertension    5. Screening PSA (prostate specific antigen)    6. Diabetes mellitus screening    7. History of colon polyps    8. Colon cancer screening        Plan:           1. Encounter to establish care    2. History of CVA (cerebrovascular accident)  -     Hemoglobin A1C; Future; Expected date: 02/28/2025  -     Lipid Panel; Future; Expected date: 02/28/2025  -      rosuvastatin (CRESTOR) 20 MG tablet; Take 1 tablet by mouth once in the evening (in place of atorvastatin).  Dispense: 90 tablet; Refill: 1    3. Mixed hyperlipidemia  -     CBC Auto Differential; Future; Expected date: 02/28/2025  -     Comprehensive Metabolic Panel; Future; Expected date: 02/28/2025  -     Lipid Panel; Future; Expected date: 02/28/2025  -     TSH; Future; Expected date: 02/28/2025  -     rosuvastatin (CRESTOR) 20 MG tablet; Take 1 tablet by mouth once in the evening (in place of atorvastatin).  Dispense: 90 tablet; Refill: 1    4. Essential hypertension  -     CBC Auto Differential; Future; Expected date: 02/28/2025  -     Comprehensive Metabolic Panel; Future; Expected date: 02/28/2025  -     Lipid Panel; Future; Expected date: 02/28/2025  -     PSA, Screening; Future; Expected date: 02/28/2025  -     amlodipine-benazepril 5-20 mg (LOTREL) 5-20 mg per capsule; Take 1 capsule by mouth once daily.  Dispense: 90 capsule; Refill: 1    5. Screening PSA (prostate specific antigen)  -     PSA, Screening; Future; Expected date: 02/28/2025    6. Diabetes mellitus screening  -     Hemoglobin A1C; Future; Expected date: 02/28/2025    7. History of colon polyps  -     Colonoscopy; Future; Expected date: 02/28/2025    8. Colon cancer screening  -     Colonoscopy; Future; Expected date: 02/28/2025    Cont current meds, refills sent  Labs as ordered  Screening c-scope; history of colon polyps  RTC 1 week for BP check or return BP log  RTC 6 months or sooner if needed

## 2025-03-17 ENCOUNTER — PATIENT OUTREACH (OUTPATIENT)
Dept: ADMINISTRATIVE | Facility: HOSPITAL | Age: 60
End: 2025-03-17
Payer: COMMERCIAL

## 2025-03-17 NOTE — PROGRESS NOTES
Left detailed message for patient to return call to complete VBC outreach.  Topics to discuss:  Colorectal Cancer Screening (Colonoscopy ordered 2/28/2025)  Admission or ED Risk: 40.6%  Schedule lab visit  Schedule Enhanced Annual Wellness Visit  Obtain remote B/P or schedule nurse visit for B/P check  Discuss Digital Medicine Hypertension Program Eligibility: Eligible  Discuss Smoking Cessation  Discuss [facility]: Refer to [facility]     PCP visit scheduled for 8/28/2025  SDOH reviewed 2/21/2025

## 2025-03-20 ENCOUNTER — LAB VISIT (OUTPATIENT)
Dept: LAB | Facility: HOSPITAL | Age: 60
End: 2025-03-20
Attending: STUDENT IN AN ORGANIZED HEALTH CARE EDUCATION/TRAINING PROGRAM
Payer: COMMERCIAL

## 2025-03-20 DIAGNOSIS — Z12.5 SCREENING PSA (PROSTATE SPECIFIC ANTIGEN): ICD-10-CM

## 2025-03-20 DIAGNOSIS — Z13.1 DIABETES MELLITUS SCREENING: ICD-10-CM

## 2025-03-20 DIAGNOSIS — I10 ESSENTIAL HYPERTENSION: ICD-10-CM

## 2025-03-20 DIAGNOSIS — E78.2 MIXED HYPERLIPIDEMIA: ICD-10-CM

## 2025-03-20 DIAGNOSIS — Z86.73 HISTORY OF CVA (CEREBROVASCULAR ACCIDENT): ICD-10-CM

## 2025-03-20 LAB
ALBUMIN SERPL BCP-MCNC: 3.9 G/DL (ref 3.5–5.2)
ALP SERPL-CCNC: 101 U/L (ref 40–150)
ALT SERPL W/O P-5'-P-CCNC: 28 U/L (ref 10–44)
ANION GAP SERPL CALC-SCNC: 6 MMOL/L (ref 8–16)
AST SERPL-CCNC: 25 U/L (ref 10–40)
BASOPHILS # BLD AUTO: 0.04 K/UL (ref 0–0.2)
BASOPHILS NFR BLD: 0.8 % (ref 0–1.9)
BILIRUB SERPL-MCNC: 0.2 MG/DL (ref 0.1–1)
BUN SERPL-MCNC: 26 MG/DL (ref 6–20)
CALCIUM SERPL-MCNC: 8.7 MG/DL (ref 8.7–10.5)
CHLORIDE SERPL-SCNC: 108 MMOL/L (ref 95–110)
CHOLEST SERPL-MCNC: 183 MG/DL (ref 120–199)
CHOLEST/HDLC SERPL: 3.1 {RATIO} (ref 2–5)
CO2 SERPL-SCNC: 28 MMOL/L (ref 23–29)
COMPLEXED PSA SERPL-MCNC: 4.6 NG/ML (ref 0–4)
CREAT SERPL-MCNC: 1.1 MG/DL (ref 0.5–1.4)
DIFFERENTIAL METHOD BLD: ABNORMAL
EOSINOPHIL # BLD AUTO: 0.1 K/UL (ref 0–0.5)
EOSINOPHIL NFR BLD: 2.3 % (ref 0–8)
ERYTHROCYTE [DISTWIDTH] IN BLOOD BY AUTOMATED COUNT: 12.7 % (ref 11.5–14.5)
EST. GFR  (NO RACE VARIABLE): >60 ML/MIN/1.73 M^2
ESTIMATED AVG GLUCOSE: 108 MG/DL (ref 68–131)
GLUCOSE SERPL-MCNC: 99 MG/DL (ref 70–110)
HBA1C MFR BLD: 5.4 % (ref 4–5.6)
HCT VFR BLD AUTO: 48.4 % (ref 40–54)
HDLC SERPL-MCNC: 59 MG/DL (ref 40–75)
HDLC SERPL: 32.2 % (ref 20–50)
HGB BLD-MCNC: 15.6 G/DL (ref 14–18)
IMM GRANULOCYTES # BLD AUTO: 0.01 K/UL (ref 0–0.04)
IMM GRANULOCYTES NFR BLD AUTO: 0.2 % (ref 0–0.5)
LDLC SERPL CALC-MCNC: 108 MG/DL (ref 63–159)
LYMPHOCYTES # BLD AUTO: 0.7 K/UL (ref 1–4.8)
LYMPHOCYTES NFR BLD: 13.6 % (ref 18–48)
MCH RBC QN AUTO: 30.1 PG (ref 27–31)
MCHC RBC AUTO-ENTMCNC: 32.2 G/DL (ref 32–36)
MCV RBC AUTO: 93 FL (ref 82–98)
MONOCYTES # BLD AUTO: 0.4 K/UL (ref 0.3–1)
MONOCYTES NFR BLD: 6.8 % (ref 4–15)
NEUTROPHILS # BLD AUTO: 3.9 K/UL (ref 1.8–7.7)
NEUTROPHILS NFR BLD: 76.3 % (ref 38–73)
NONHDLC SERPL-MCNC: 124 MG/DL
NRBC BLD-RTO: 0 /100 WBC
PLATELET # BLD AUTO: 241 K/UL (ref 150–450)
PMV BLD AUTO: 9.8 FL (ref 9.2–12.9)
POTASSIUM SERPL-SCNC: 4.4 MMOL/L (ref 3.5–5.1)
PROT SERPL-MCNC: 7.6 G/DL (ref 6–8.4)
RBC # BLD AUTO: 5.18 M/UL (ref 4.6–6.2)
SODIUM SERPL-SCNC: 142 MMOL/L (ref 136–145)
TRIGL SERPL-MCNC: 80 MG/DL (ref 30–150)
TSH SERPL DL<=0.005 MIU/L-ACNC: 0.63 UIU/ML (ref 0.4–4)
WBC # BLD AUTO: 5.14 K/UL (ref 3.9–12.7)

## 2025-03-20 PROCEDURE — 80061 LIPID PANEL: CPT | Performed by: STUDENT IN AN ORGANIZED HEALTH CARE EDUCATION/TRAINING PROGRAM

## 2025-03-20 PROCEDURE — 80053 COMPREHEN METABOLIC PANEL: CPT | Performed by: STUDENT IN AN ORGANIZED HEALTH CARE EDUCATION/TRAINING PROGRAM

## 2025-03-20 PROCEDURE — 85025 COMPLETE CBC W/AUTO DIFF WBC: CPT | Performed by: STUDENT IN AN ORGANIZED HEALTH CARE EDUCATION/TRAINING PROGRAM

## 2025-03-20 PROCEDURE — 84153 ASSAY OF PSA TOTAL: CPT | Performed by: STUDENT IN AN ORGANIZED HEALTH CARE EDUCATION/TRAINING PROGRAM

## 2025-03-20 PROCEDURE — 36415 COLL VENOUS BLD VENIPUNCTURE: CPT | Performed by: STUDENT IN AN ORGANIZED HEALTH CARE EDUCATION/TRAINING PROGRAM

## 2025-03-20 PROCEDURE — 84443 ASSAY THYROID STIM HORMONE: CPT | Performed by: STUDENT IN AN ORGANIZED HEALTH CARE EDUCATION/TRAINING PROGRAM

## 2025-03-20 PROCEDURE — 83036 HEMOGLOBIN GLYCOSYLATED A1C: CPT | Performed by: STUDENT IN AN ORGANIZED HEALTH CARE EDUCATION/TRAINING PROGRAM

## 2025-03-21 ENCOUNTER — RESULTS FOLLOW-UP (OUTPATIENT)
Dept: FAMILY MEDICINE | Facility: CLINIC | Age: 60
End: 2025-03-21

## 2025-03-21 NOTE — PROGRESS NOTES
Please inform the pt recent labs ok except for very slight elevation in his PSA. I would like to repeat this in 3 months. If he prefers, I can place a referral to urology just let me know.

## 2025-03-23 ENCOUNTER — PATIENT MESSAGE (OUTPATIENT)
Dept: FAMILY MEDICINE | Facility: CLINIC | Age: 60
End: 2025-03-23
Payer: COMMERCIAL

## 2025-03-24 VITALS — SYSTOLIC BLOOD PRESSURE: 130 MMHG | DIASTOLIC BLOOD PRESSURE: 78 MMHG

## 2025-03-25 DIAGNOSIS — R97.20 ELEVATED PSA: Primary | ICD-10-CM

## 2025-05-04 ENCOUNTER — HOSPITAL ENCOUNTER (EMERGENCY)
Facility: HOSPITAL | Age: 60
Discharge: HOME | End: 2025-05-04
Attending: FAMILY MEDICINE
Payer: COMMERCIAL

## 2025-05-04 VITALS
HEIGHT: 74 IN | HEART RATE: 65 BPM | OXYGEN SATURATION: 95 % | BODY MASS INDEX: 26.6 KG/M2 | WEIGHT: 207.25 LBS | RESPIRATION RATE: 20 BRPM | SYSTOLIC BLOOD PRESSURE: 129 MMHG | TEMPERATURE: 98 F | DIASTOLIC BLOOD PRESSURE: 75 MMHG

## 2025-05-04 DIAGNOSIS — R07.89 ATYPICAL CHEST PAIN: Primary | ICD-10-CM

## 2025-05-04 LAB
ABSOLUTE EOSINOPHIL (OHS): 0.17 K/UL
ABSOLUTE MONOCYTE (OHS): 0.38 K/UL (ref 0.3–1)
ABSOLUTE NEUTROPHIL COUNT (OHS): 3.52 K/UL (ref 1.8–7.7)
ALBUMIN SERPL BCP-MCNC: 4.3 G/DL (ref 3.5–5.2)
ALP SERPL-CCNC: 94 UNIT/L (ref 40–150)
ALT SERPL W/O P-5'-P-CCNC: 49 UNIT/L (ref 10–44)
ANION GAP (OHS): 9 MMOL/L (ref 8–16)
AST SERPL-CCNC: 30 UNIT/L (ref 11–45)
BASOPHILS # BLD AUTO: 0.03 K/UL
BASOPHILS NFR BLD AUTO: 0.6 %
BILIRUB SERPL-MCNC: 0.4 MG/DL (ref 0.1–1)
BNP SERPL-MCNC: <10 PG/ML (ref 0–99)
BUN SERPL-MCNC: 23 MG/DL (ref 6–20)
CALCIUM SERPL-MCNC: 9.2 MG/DL (ref 8.7–10.5)
CHLORIDE SERPL-SCNC: 106 MMOL/L (ref 95–110)
CO2 SERPL-SCNC: 26 MMOL/L (ref 23–29)
CREAT SERPL-MCNC: 0.9 MG/DL (ref 0.5–1.4)
D DIMER PPP IA.FEU-MCNC: 0.44 MG/L FEU
ERYTHROCYTE [DISTWIDTH] IN BLOOD BY AUTOMATED COUNT: 12.3 % (ref 11.5–14.5)
GFR SERPLBLD CREATININE-BSD FMLA CKD-EPI: >60 ML/MIN/1.73/M2
GLUCOSE SERPL-MCNC: 92 MG/DL (ref 70–110)
HCT VFR BLD AUTO: 47.2 % (ref 40–54)
HGB BLD-MCNC: 16 GM/DL (ref 14–18)
IMM GRANULOCYTES # BLD AUTO: 0.01 K/UL (ref 0–0.04)
IMM GRANULOCYTES NFR BLD AUTO: 0.2 % (ref 0–0.5)
LYMPHOCYTES # BLD AUTO: 0.78 K/UL (ref 1–4.8)
MCH RBC QN AUTO: 30.9 PG (ref 27–31)
MCHC RBC AUTO-ENTMCNC: 33.9 G/DL (ref 32–36)
MCV RBC AUTO: 91 FL (ref 82–98)
NUCLEATED RBC (/100WBC) (OHS): 0 /100 WBC
PLATELET # BLD AUTO: 246 K/UL (ref 150–450)
PMV BLD AUTO: 9.5 FL (ref 9.2–12.9)
POTASSIUM SERPL-SCNC: 4 MMOL/L (ref 3.5–5.1)
PROT SERPL-MCNC: 8.1 GM/DL (ref 6–8.4)
RBC # BLD AUTO: 5.17 M/UL (ref 4.6–6.2)
RELATIVE EOSINOPHIL (OHS): 3.5 %
RELATIVE LYMPHOCYTE (OHS): 16 % (ref 18–48)
RELATIVE MONOCYTE (OHS): 7.8 % (ref 4–15)
RELATIVE NEUTROPHIL (OHS): 71.9 % (ref 38–73)
SODIUM SERPL-SCNC: 141 MMOL/L (ref 136–145)
TROPONIN I SERPL DL<=0.01 NG/ML-MCNC: <0.006 NG/ML
TROPONIN I SERPL DL<=0.01 NG/ML-MCNC: <0.006 NG/ML
WBC # BLD AUTO: 4.89 K/UL (ref 3.9–12.7)

## 2025-05-04 PROCEDURE — 99900035 HC TECH TIME PER 15 MIN (STAT)

## 2025-05-04 PROCEDURE — 80053 COMPREHEN METABOLIC PANEL: CPT | Performed by: NURSE PRACTITIONER

## 2025-05-04 PROCEDURE — 94760 N-INVAS EAR/PLS OXIMETRY 1: CPT

## 2025-05-04 PROCEDURE — 83880 ASSAY OF NATRIURETIC PEPTIDE: CPT | Performed by: NURSE PRACTITIONER

## 2025-05-04 PROCEDURE — 25000003 PHARM REV CODE 250: Performed by: NURSE PRACTITIONER

## 2025-05-04 PROCEDURE — 84484 ASSAY OF TROPONIN QUANT: CPT | Performed by: NURSE PRACTITIONER

## 2025-05-04 PROCEDURE — 93010 ELECTROCARDIOGRAM REPORT: CPT | Mod: ,,, | Performed by: INTERNAL MEDICINE

## 2025-05-04 PROCEDURE — 93005 ELECTROCARDIOGRAM TRACING: CPT

## 2025-05-04 PROCEDURE — 85025 COMPLETE CBC W/AUTO DIFF WBC: CPT | Performed by: NURSE PRACTITIONER

## 2025-05-04 PROCEDURE — 99285 EMERGENCY DEPT VISIT HI MDM: CPT | Mod: 25

## 2025-05-04 PROCEDURE — 85379 FIBRIN DEGRADATION QUANT: CPT | Performed by: NURSE PRACTITIONER

## 2025-05-04 RX ORDER — LIDOCAINE HYDROCHLORIDE 20 MG/ML
15 SOLUTION OROPHARYNGEAL ONCE
Status: COMPLETED | OUTPATIENT
Start: 2025-05-04 | End: 2025-05-04

## 2025-05-04 RX ORDER — ASPIRIN 325 MG
325 TABLET ORAL
Status: COMPLETED | OUTPATIENT
Start: 2025-05-04 | End: 2025-05-04

## 2025-05-04 RX ORDER — ALUMINUM HYDROXIDE, MAGNESIUM HYDROXIDE, AND SIMETHICONE 1200; 120; 1200 MG/30ML; MG/30ML; MG/30ML
30 SUSPENSION ORAL ONCE
Status: COMPLETED | OUTPATIENT
Start: 2025-05-04 | End: 2025-05-04

## 2025-05-04 RX ORDER — PANTOPRAZOLE SODIUM 40 MG/1
40 TABLET, DELAYED RELEASE ORAL DAILY
Qty: 14 TABLET | Refills: 0 | Status: SHIPPED | OUTPATIENT
Start: 2025-05-04

## 2025-05-04 RX ADMIN — LIDOCAINE HYDROCHLORIDE 15 ML: 20 SOLUTION ORAL at 08:05

## 2025-05-04 RX ADMIN — ALUMINUM HYDROXIDE, MAGNESIUM HYDROXIDE, AND DIMETHICONE 30 ML: 200; 20; 200 SUSPENSION ORAL at 08:05

## 2025-05-04 RX ADMIN — ASPIRIN 325 MG ORAL TABLET 325 MG: 325 PILL ORAL at 06:05

## 2025-05-04 NOTE — Clinical Note
"Jett Arredondoothy" Elio was seen and treated in our emergency department on 5/4/2025.  He may return to work on 05/05/2025.       If you have any questions or concerns, please don't hesitate to call.      Audra Cruz MD"

## 2025-05-04 NOTE — ED TRIAGE NOTES
Pt arrived to ED c/o cramping, pressure midsternal chest pain that started about 20 minutes ago. Pt reports some shortness of breath when he takes a deep breath. Pt rates pain 8/10. Pt denies cardiac history.

## 2025-05-04 NOTE — ED PROVIDER NOTES
Encounter Date: 5/4/2025       History     Chief Complaint   Patient presents with    Chest Pain     Pt arrived to ED c/o cramping, pressure midsternal chest pain that started about 20 minutes ago. Pt reports some shortness of breath when he takes a deep breath. Pt rates pain 8/10. Pt denies cardiac history.      Jett Ballard is a 59 y.o. male with PMH of hypertension, positional light-headedness presenting to the ED for evaluation of chest pain.  Patient reports acute onset of substernal chest wall pain that started approximately 20 minutes PTA.  He reports that pain feels like a squeezing sensation in the center of his chest that is nonradiating.  He currently rates pain 8/10 in severity.  Denies alleviating or exacerbating factors.  He also endorses some SOB when taking a deep breath.  Denies cough or upper respiratory symptoms.  He is not a smoker.  Denies history of CAD.     The history is provided by the patient.     Review of patient's allergies indicates:   Allergen Reactions    Adhesive Rash    Latex, natural rubber Rash     Past Medical History:   Diagnosis Date    History of COVID-19 01/2023    Hypertension     Left sided numbness     Positional lightheadedness     Weakness      Past Surgical History:   Procedure Laterality Date    BACK SURGERY      COLONOSCOPY N/A 7/19/2018    Procedure: COLONOSCOPY;  Surgeon: Patric Carrera MD;  Location: [facility];  Service: Endoscopy;  Laterality: N/A;    TONSILLECTOMY       Family History   Problem Relation Name Age of Onset    Heart disease Father      Cancer Father       Social History[1]  Review of Systems   Constitutional:  Negative for activity change, fatigue and fever.   HENT:  Negative for congestion, rhinorrhea and sore throat.    Respiratory:  Negative for cough, chest tightness and shortness of breath.    Cardiovascular:  Positive for chest pain.   Gastrointestinal:  Negative for abdominal pain, diarrhea, nausea and vomiting.   Genitourinary:  Negative  for dysuria.   Musculoskeletal:  Negative for back pain.   Neurological:  Negative for dizziness and weakness.       Physical Exam     Initial Vitals [05/04/25 1832]   BP Pulse Resp Temp SpO2   136/88 96 20 98.3 °F (36.8 °C) 99 %      MAP       --         Physical Exam    Nursing note and vitals reviewed.  Constitutional: He appears well-developed and well-nourished.   HENT:   Head: Normocephalic and atraumatic.   Eyes: Conjunctivae and EOM are normal. Pupils are equal, round, and reactive to light.   Neck: Neck supple.   Cardiovascular:  Normal rate, regular rhythm, normal heart sounds and intact distal pulses.           Pulmonary/Chest: Breath sounds normal.   Abdominal: Abdomen is soft. Bowel sounds are normal.   Musculoskeletal:         General: Normal range of motion.      Cervical back: Neck supple.     Neurological: He is alert and oriented to person, place, and time. He has normal strength.   Skin: Skin is warm and dry.   Psychiatric: He has a normal mood and affect. His behavior is normal. Judgment and thought content normal.         ED Course   Procedures  Labs Reviewed   COMPREHENSIVE METABOLIC PANEL - Abnormal       Result Value    Sodium 141      Potassium 4.0      Chloride 106      CO2 26      Glucose 92      BUN 23 (*)     Creatinine 0.9      Calcium 9.2      Protein Total 8.1      Albumin 4.3      Bilirubin Total 0.4      ALP 94      AST 30      ALT 49 (*)     Anion Gap 9      eGFR >60     CBC WITH DIFFERENTIAL - Abnormal    WBC 4.89      RBC 5.17      HGB 16.0      HCT 47.2      MCV 91      MCH 30.9      MCHC 33.9      RDW 12.3      Platelet Count 246      MPV 9.5      Nucleated RBC 0      Neut % 71.9      Lymph % 16.0 (*)     Mono % 7.8      Eos % 3.5      Basophil % 0.6      Imm Grans % 0.2      Neut # 3.52      Lymph # 0.78 (*)     Mono # 0.38      Eos # 0.17      Baso # 0.03      Imm Grans # 0.01     TROPONIN I - Normal    Troponin-I <0.006     TROPONIN I - Normal    Troponin-I <0.006     B-TYPE  NATRIURETIC PEPTIDE - Normal    BNP <10     D DIMER, QUANTITATIVE - Normal    D-Dimer 0.44     CBC W/ AUTO DIFFERENTIAL    Narrative:     The following orders were created for panel order CBC auto differential.  Procedure                               Abnormality         Status                     ---------                               -----------         ------                     CBC with Differential[[id number]]       Abnormal            Final result                 Please view results for these tests on the individual orders.          Imaging Results              X-Ray Chest AP Portable (Final result)  Result time 05/04/25 19:10:18      Final result by Sadi Alaniz DO (05/04/25 19:10:18)                   Impression:      No acute abnormality.      Electronically signed by: Sadi Alaniz  Date:    05/04/2025  Time:    19:10               Narrative:    EXAMINATION:  XR CHEST AP PORTABLE    CLINICAL HISTORY:  Chest Pain;    TECHNIQUE:  Single frontal view of the chest was performed.    COMPARISON:  08/02/2020.    FINDINGS:  The lungs are well expanded and clear. No focal opacities are seen. The pleural spaces are clear. The cardiac silhouette is unremarkable. The visualized osseous structures demonstrate degenerative changes.                                       Medications   aspirin tablet 325 mg (325 mg Oral Given 5/4/25 1851)   aluminum-magnesium hydroxide-simethicone 200-200-20 mg/5 mL suspension 30 mL (30 mLs Oral Given 5/4/25 2004)     And   LIDOcaine viscous HCl 2% oral solution 15 mL (15 mLs Oral Given 5/4/25 2004)     Medical Decision Making  Evaluation of a 59-year-old male presenting with acute substernal chest wall pain  Presents nontoxic appearing with stable vital signs  Physical exam with clear breath sounds  RRR, no murmur    Differential diagnosis includes ACS, costochondritis, pleurisy, musculoskeletal pain, PE, pneumonia, GERD    Amount and/or Complexity of Data Reviewed  Labs: ordered.  Decision-making details documented in ED Course.  Radiology: ordered. Decision-making details documented in ED Course.  ECG/medicine tests: ordered and independent interpretation performed.     Details: Normal sinus rhythm, rate 97.  Normal CA and QRS interval.  No STEMI.  No significant change when compared to EKG of 08/02/2020    Risk  OTC drugs.  Prescription drug management.  Risk Details: Stable for discharge home.  Negative cardiac workup including negative troponin x2. Normal EKG and CXR.  Patient given GI cocktail with complete relief of pain. He is pain free at time of discharge. Likely GERD related pain; will dc home with Protonix and OP follow-up with [facility]-he is established with Dr. Armijo.  He was given strict return precautions. Patient/caregiver voices understanding and feels comfortable with discharge plan.      The patient acknowledges that close follow up with medical provider is required. Instructed to follow up with PCP within 2 days. Patient was given specific return precautions. The patient agrees to comply with all instruction and directions given in the ER.                 ED Course as of 05/04/25 2228   Sun May 04, 2025   1841 EKG  Normal sinus rhythm  Heart rate 97  QRS 92  No acute ST elevations  EKG reviewed interpreted by myself [FP]      ED Course User Index  [FP] Audra Cruz MD                           Clinical Impression:  Final diagnoses:  [R07.89] Atypical chest pain (Primary)          ED Disposition Condition    Discharge Stable          ED Prescriptions       Medication Sig Dispense Start Date End Date Auth. Provider    pantoprazole (PROTONIX) 40 MG tablet Take 1 tablet (40 mg total) by mouth once daily. 14 tablet 5/4/2025 -- Marisol Murdock NP          Follow-up Information       Follow up With Specialties Details Why Contact Info    Dale Armijo MD Cardiology Go in 1 day  [address] [place] [address]  [phone]                   [1]   Social  History  Tobacco Use    Smoking status: Never    Smokeless tobacco: Current     Types: Snuff    Tobacco comments:     33 yr history of dip   Substance Use Topics    Alcohol use: No     Alcohol/week: 0.0 standard drinks of alcohol    Drug use: No        Marisol Murdock NP  05/04/25 4559

## 2025-05-06 LAB
OHS QRS DURATION: 92 MS
OHS QTC CALCULATION: 391 MS

## 2025-06-24 ENCOUNTER — LAB VISIT (OUTPATIENT)
Dept: LAB | Facility: HOSPITAL | Age: 60
End: 2025-06-24
Attending: STUDENT IN AN ORGANIZED HEALTH CARE EDUCATION/TRAINING PROGRAM
Payer: COMMERCIAL

## 2025-06-24 DIAGNOSIS — R97.20 ELEVATED PSA: ICD-10-CM

## 2025-06-24 LAB
ANION GAP (OHS): 10 MMOL/L (ref 8–16)
BUN SERPL-MCNC: 17 MG/DL (ref 6–20)
CALCIUM SERPL-MCNC: 9.7 MG/DL (ref 8.7–10.5)
CHLORIDE SERPL-SCNC: 106 MMOL/L (ref 95–110)
CO2 SERPL-SCNC: 25 MMOL/L (ref 23–29)
CREAT SERPL-MCNC: 1 MG/DL (ref 0.5–1.4)
GFR SERPLBLD CREATININE-BSD FMLA CKD-EPI: >60 ML/MIN/1.73/M2
GLUCOSE SERPL-MCNC: 101 MG/DL (ref 70–110)
POTASSIUM SERPL-SCNC: 3.9 MMOL/L (ref 3.5–5.1)
PSA SERPL-MCNC: 5.53 NG/ML
SODIUM SERPL-SCNC: 141 MMOL/L (ref 136–145)

## 2025-06-24 PROCEDURE — 80048 BASIC METABOLIC PNL TOTAL CA: CPT

## 2025-06-24 PROCEDURE — 84153 ASSAY OF PSA TOTAL: CPT

## 2025-06-24 PROCEDURE — 36415 COLL VENOUS BLD VENIPUNCTURE: CPT

## 2025-07-02 ENCOUNTER — PATIENT MESSAGE (OUTPATIENT)
Dept: FAMILY MEDICINE | Facility: CLINIC | Age: 60
End: 2025-07-02
Payer: COMMERCIAL

## 2025-07-02 ENCOUNTER — RESULTS FOLLOW-UP (OUTPATIENT)
Dept: FAMILY MEDICINE | Facility: CLINIC | Age: 60
End: 2025-07-02

## 2025-07-02 DIAGNOSIS — R97.20 ELEVATED PSA: Primary | ICD-10-CM

## 2025-07-10 ENCOUNTER — TELEPHONE (OUTPATIENT)
Dept: FAMILY MEDICINE | Facility: CLINIC | Age: 60
End: 2025-07-10
Payer: COMMERCIAL

## 2025-07-10 NOTE — TELEPHONE ENCOUNTER
----- Message from Jake Michelle MD sent at 7/2/2025  6:09 PM CDT -----  Please inform the pt recent labs show PSA has increased  slightly, I will place a referral to urology for further eval.  ----- Message -----  From: Lab, Background User  Sent: 6/24/2025   7:05 PM CDT  To: Jake Michelle MD

## 2025-08-18 DIAGNOSIS — E78.2 MIXED HYPERLIPIDEMIA: ICD-10-CM

## 2025-08-18 DIAGNOSIS — I10 ESSENTIAL HYPERTENSION: ICD-10-CM

## 2025-08-18 DIAGNOSIS — Z86.73 HISTORY OF CVA (CEREBROVASCULAR ACCIDENT): ICD-10-CM

## 2025-08-19 RX ORDER — AMLODIPINE AND BENAZEPRIL HYDROCHLORIDE 5; 20 MG/1; MG/1
1 CAPSULE ORAL DAILY
Qty: 90 CAPSULE | Refills: 1 | Status: SHIPPED | OUTPATIENT
Start: 2025-08-19

## 2025-08-19 RX ORDER — ROSUVASTATIN CALCIUM 20 MG/1
TABLET, COATED ORAL
Qty: 90 TABLET | Refills: 1 | Status: SHIPPED | OUTPATIENT
Start: 2025-08-19